# Patient Record
Sex: FEMALE | Race: WHITE | URBAN - METROPOLITAN AREA
[De-identification: names, ages, dates, MRNs, and addresses within clinical notes are randomized per-mention and may not be internally consistent; named-entity substitution may affect disease eponyms.]

---

## 2017-12-17 PROBLEM — J44.1 COPD EXACERBATION (HCC): Status: ACTIVE | Noted: 2017-12-17

## 2017-12-18 PROBLEM — J20.9 ACUTE BRONCHITIS: Status: ACTIVE | Noted: 2017-12-18

## 2017-12-18 PROBLEM — J96.01 ACUTE RESPIRATORY FAILURE WITH HYPOXIA (HCC): Status: ACTIVE | Noted: 2017-12-18

## 2017-12-20 PROBLEM — J96.01 ACUTE RESPIRATORY FAILURE WITH HYPOXIA (HCC): Status: RESOLVED | Noted: 2017-12-18 | Resolved: 2017-12-20

## 2018-01-04 ENCOUNTER — OFFICE VISIT (OUTPATIENT)
Dept: PULMONOLOGY | Age: 48
End: 2018-01-04

## 2018-01-04 VITALS
RESPIRATION RATE: 16 BRPM | WEIGHT: 237 LBS | HEIGHT: 66 IN | HEART RATE: 81 BPM | SYSTOLIC BLOOD PRESSURE: 126 MMHG | TEMPERATURE: 95 F | BODY MASS INDEX: 38.09 KG/M2 | DIASTOLIC BLOOD PRESSURE: 85 MMHG | OXYGEN SATURATION: 95 %

## 2018-01-04 DIAGNOSIS — J44.1 COPD EXACERBATION (HCC): ICD-10-CM

## 2018-01-04 DIAGNOSIS — Z23 NEED FOR STREPTOCOCCUS PNEUMONIAE VACCINATION: ICD-10-CM

## 2018-01-04 DIAGNOSIS — Z72.0 TOBACCO ABUSE: ICD-10-CM

## 2018-01-04 DIAGNOSIS — R05.9 COUGH: Primary | ICD-10-CM

## 2018-01-04 PROCEDURE — 3023F SPIROM DOC REV: CPT | Performed by: NURSE PRACTITIONER

## 2018-01-04 PROCEDURE — 4004F PT TOBACCO SCREEN RCVD TLK: CPT | Performed by: NURSE PRACTITIONER

## 2018-01-04 PROCEDURE — G8427 DOCREV CUR MEDS BY ELIG CLIN: HCPCS | Performed by: NURSE PRACTITIONER

## 2018-01-04 PROCEDURE — 90471 IMMUNIZATION ADMIN: CPT | Performed by: NURSE PRACTITIONER

## 2018-01-04 PROCEDURE — G8484 FLU IMMUNIZE NO ADMIN: HCPCS | Performed by: NURSE PRACTITIONER

## 2018-01-04 PROCEDURE — G8417 CALC BMI ABV UP PARAM F/U: HCPCS | Performed by: NURSE PRACTITIONER

## 2018-01-04 PROCEDURE — 90732 PPSV23 VACC 2 YRS+ SUBQ/IM: CPT | Performed by: NURSE PRACTITIONER

## 2018-01-04 PROCEDURE — 99214 OFFICE O/P EST MOD 30 MIN: CPT | Performed by: NURSE PRACTITIONER

## 2018-01-04 PROCEDURE — G8926 SPIRO NO PERF OR DOC: HCPCS | Performed by: NURSE PRACTITIONER

## 2018-01-04 PROCEDURE — 1111F DSCHRG MED/CURRENT MED MERGE: CPT | Performed by: NURSE PRACTITIONER

## 2018-01-04 RX ORDER — BENZONATATE 100 MG/1
100 CAPSULE ORAL 3 TIMES DAILY PRN
Qty: 42 CAPSULE | Refills: 1 | Status: SHIPPED | OUTPATIENT
Start: 2018-01-04 | End: 2018-01-18

## 2018-01-04 RX ORDER — ALBUTEROL SULFATE 90 UG/1
2 AEROSOL, METERED RESPIRATORY (INHALATION) EVERY 6 HOURS PRN
Qty: 1 INHALER | Refills: 3 | Status: SHIPPED | OUTPATIENT
Start: 2018-01-04 | End: 2018-02-26 | Stop reason: SDUPTHER

## 2018-01-04 RX ORDER — PREDNISONE 20 MG/1
40 TABLET ORAL DAILY
Qty: 10 TABLET | Refills: 0 | Status: SHIPPED | OUTPATIENT
Start: 2018-01-04 | End: 2018-01-09

## 2018-02-20 ENCOUNTER — HOSPITAL ENCOUNTER (OUTPATIENT)
Dept: CARDIAC REHAB | Age: 48
Discharge: HOME | End: 2018-02-20
Attending: NURSE PRACTITIONER | Admitting: NURSE PRACTITIONER

## 2018-02-20 DIAGNOSIS — R05.9 COUGH: ICD-10-CM

## 2018-02-20 PROCEDURE — 94060 EVALUATION OF WHEEZING: CPT | Performed by: INTERNAL MEDICINE

## 2018-02-20 PROCEDURE — 94727 GAS DIL/WSHOT DETER LNG VOL: CPT | Performed by: INTERNAL MEDICINE

## 2018-02-20 PROCEDURE — 94729 DIFFUSING CAPACITY: CPT | Performed by: INTERNAL MEDICINE

## 2018-02-20 PROCEDURE — 94618 PULMONARY STRESS TESTING: CPT | Performed by: INTERNAL MEDICINE

## 2018-02-20 RX ORDER — ALBUTEROL SULFATE 90 UG/1
4 AEROSOL, METERED RESPIRATORY (INHALATION) ONCE
Status: COMPLETED | OUTPATIENT
Start: 2018-02-20 | End: 2018-02-20

## 2018-02-20 RX ADMIN — ALBUTEROL SULFATE 4 PUFF: 90 AEROSOL, METERED RESPIRATORY (INHALATION) at 14:39

## 2018-02-20 NOTE — PROGRESS NOTES
[facility] Cardiopulmonary Rehabilitation    Qualifying Data for Home Oxygen        Resting Oxygen Saturation on Room Air:   95%    Exercise Oxygen Saturation on Room Air:  87%      Resting Oxygen Saturation on Oxygen:  99% 2L      Exercise Oxygen Saturation on Oxygen: 94% 2L

## 2018-02-22 ENCOUNTER — OFFICE VISIT (OUTPATIENT)
Dept: FAMILY MEDICINE CLINIC | Age: 48
End: 2018-02-22

## 2018-02-22 ENCOUNTER — HOSPITAL ENCOUNTER (OUTPATIENT)
Dept: OTHER | Age: 48
Discharge: HOME | End: 2018-02-22
Attending: FAMILY MEDICINE | Admitting: FAMILY MEDICINE

## 2018-02-22 VITALS
HEIGHT: 66 IN | SYSTOLIC BLOOD PRESSURE: 128 MMHG | WEIGHT: 239 LBS | BODY MASS INDEX: 38.41 KG/M2 | DIASTOLIC BLOOD PRESSURE: 84 MMHG | TEMPERATURE: 98.4 F | OXYGEN SATURATION: 96 % | HEART RATE: 76 BPM | RESPIRATION RATE: 22 BRPM

## 2018-02-22 DIAGNOSIS — G89.29 CHRONIC PAIN OF BOTH KNEES: ICD-10-CM

## 2018-02-22 DIAGNOSIS — F41.9 ANXIETY AND DEPRESSION: Primary | ICD-10-CM

## 2018-02-22 DIAGNOSIS — M25.561 CHRONIC PAIN OF BOTH KNEES: ICD-10-CM

## 2018-02-22 DIAGNOSIS — F41.9 ANXIETY AND DEPRESSION: ICD-10-CM

## 2018-02-22 DIAGNOSIS — J44.9 CHRONIC OBSTRUCTIVE PULMONARY DISEASE, UNSPECIFIED COPD TYPE (HCC): ICD-10-CM

## 2018-02-22 DIAGNOSIS — Z72.0 TOBACCO USE: ICD-10-CM

## 2018-02-22 DIAGNOSIS — M25.561 PAIN IN BOTH KNEES, UNSPECIFIED CHRONICITY: ICD-10-CM

## 2018-02-22 DIAGNOSIS — F32.A ANXIETY AND DEPRESSION: Primary | ICD-10-CM

## 2018-02-22 DIAGNOSIS — F32.A ANXIETY AND DEPRESSION: ICD-10-CM

## 2018-02-22 DIAGNOSIS — M25.562 CHRONIC PAIN OF BOTH KNEES: ICD-10-CM

## 2018-02-22 DIAGNOSIS — M25.562 PAIN IN BOTH KNEES, UNSPECIFIED CHRONICITY: ICD-10-CM

## 2018-02-22 DIAGNOSIS — F51.9 PSYCHOPHYSIOLOGIC SLEEP DISORDER: ICD-10-CM

## 2018-02-22 PROBLEM — J44.1 COPD EXACERBATION (HCC): Status: RESOLVED | Noted: 2017-12-17 | Resolved: 2018-02-22

## 2018-02-22 PROBLEM — J20.9 ACUTE BRONCHITIS: Status: RESOLVED | Noted: 2017-12-18 | Resolved: 2018-02-22

## 2018-02-22 LAB
A/G RATIO: 1.6 (ref 1.1–2.2)
ALBUMIN SERPL-MCNC: 4.7 G/DL (ref 3.4–5)
ALP BLD-CCNC: 71 U/L (ref 40–129)
ALT SERPL-CCNC: 16 U/L (ref 10–40)
ANION GAP SERPL CALCULATED.3IONS-SCNC: 14 MMOL/L (ref 3–16)
AST SERPL-CCNC: 19 U/L (ref 15–37)
BASOPHILS ABSOLUTE: 0.1 K/UL (ref 0–0.2)
BASOPHILS RELATIVE PERCENT: 1.3 %
BILIRUB SERPL-MCNC: 0.3 MG/DL (ref 0–1)
BUN BLDV-MCNC: 11 MG/DL (ref 7–20)
CALCIUM SERPL-MCNC: 9.4 MG/DL (ref 8.3–10.6)
CHLORIDE BLD-SCNC: 102 MMOL/L (ref 99–110)
CHOLESTEROL, TOTAL: 208 MG/DL (ref 0–199)
CO2: 25 MMOL/L (ref 21–32)
CREAT SERPL-MCNC: <0.5 MG/DL (ref 0.6–1.1)
EOSINOPHILS ABSOLUTE: 0.3 K/UL (ref 0–0.6)
EOSINOPHILS RELATIVE PERCENT: 3 %
ESTIMATED AVERAGE GLUCOSE: 119.8 MG/DL
GFR AFRICAN AMERICAN: >60
GFR NON-AFRICAN AMERICAN: >60
GLOBULIN: 2.9 G/DL
GLUCOSE BLD-MCNC: 91 MG/DL (ref 70–99)
HBA1C MFR BLD: 5.8 %
HCT VFR BLD CALC: 40.1 % (ref 36–48)
HDLC SERPL-MCNC: 29 MG/DL (ref 40–60)
HEMOGLOBIN: 13.8 G/DL (ref 12–16)
LDL CHOLESTEROL CALCULATED: 126 MG/DL
LYMPHOCYTES ABSOLUTE: 3.5 K/UL (ref 1–5.1)
LYMPHOCYTES RELATIVE PERCENT: 36 %
MCH RBC QN AUTO: 29.7 PG (ref 26–34)
MCHC RBC AUTO-ENTMCNC: 34.4 G/DL (ref 31–36)
MCV RBC AUTO: 86.3 FL (ref 80–100)
MONOCYTES ABSOLUTE: 0.7 K/UL (ref 0–1.3)
MONOCYTES RELATIVE PERCENT: 7 %
NEUTROPHILS ABSOLUTE: 5.1 K/UL (ref 1.7–7.7)
NEUTROPHILS RELATIVE PERCENT: 52.7 %
PDW BLD-RTO: 14.3 % (ref 12.4–15.4)
PLATELET # BLD: 384 K/UL (ref 135–450)
PMV BLD AUTO: 8.8 FL (ref 5–10.5)
POTASSIUM SERPL-SCNC: 4.5 MMOL/L (ref 3.5–5.1)
RBC # BLD: 4.64 M/UL (ref 4–5.2)
SODIUM BLD-SCNC: 141 MMOL/L (ref 136–145)
TOTAL PROTEIN: 7.6 G/DL (ref 6.4–8.2)
TRIGL SERPL-MCNC: 264 MG/DL (ref 0–150)
TSH SERPL DL<=0.05 MIU/L-ACNC: 6.58 UIU/ML (ref 0.27–4.2)
VLDLC SERPL CALC-MCNC: 53 MG/DL
WBC # BLD: 9.7 K/UL (ref 4–11)

## 2018-02-22 PROCEDURE — 99203 OFFICE O/P NEW LOW 30 MIN: CPT | Performed by: FAMILY MEDICINE

## 2018-02-22 PROCEDURE — 4004F PT TOBACCO SCREEN RCVD TLK: CPT | Performed by: FAMILY MEDICINE

## 2018-02-22 PROCEDURE — G8484 FLU IMMUNIZE NO ADMIN: HCPCS | Performed by: FAMILY MEDICINE

## 2018-02-22 PROCEDURE — G8926 SPIRO NO PERF OR DOC: HCPCS | Performed by: FAMILY MEDICINE

## 2018-02-22 PROCEDURE — G8417 CALC BMI ABV UP PARAM F/U: HCPCS | Performed by: FAMILY MEDICINE

## 2018-02-22 PROCEDURE — 3023F SPIROM DOC REV: CPT | Performed by: FAMILY MEDICINE

## 2018-02-22 PROCEDURE — G8427 DOCREV CUR MEDS BY ELIG CLIN: HCPCS | Performed by: FAMILY MEDICINE

## 2018-02-22 RX ORDER — FLUTICASONE FUROATE AND VILANTEROL 200; 25 UG/1; UG/1
1 POWDER RESPIRATORY (INHALATION) DAILY
Qty: 1 EACH | Refills: 3 | Status: SHIPPED | OUTPATIENT
Start: 2018-02-22 | End: 2018-05-24

## 2018-02-22 RX ORDER — TRAZODONE HYDROCHLORIDE 50 MG/1
TABLET ORAL
Qty: 30 TABLET | Refills: 2 | Status: SHIPPED | OUTPATIENT
Start: 2018-02-22 | End: 2018-05-24 | Stop reason: ALTCHOICE

## 2018-02-22 ASSESSMENT — PATIENT HEALTH QUESTIONNAIRE - PHQ9
6. FEELING BAD ABOUT YOURSELF - OR THAT YOU ARE A FAILURE OR HAVE LET YOURSELF OR YOUR FAMILY DOWN: 3
10. IF YOU CHECKED OFF ANY PROBLEMS, HOW DIFFICULT HAVE THESE PROBLEMS MADE IT FOR YOU TO DO YOUR WORK, TAKE CARE OF THINGS AT HOME, OR GET ALONG WITH OTHER PEOPLE: 3
9. THOUGHTS THAT YOU WOULD BE BETTER OFF DEAD, OR OF HURTING YOURSELF: 0
3. TROUBLE FALLING OR STAYING ASLEEP: 3
2. FEELING DOWN, DEPRESSED OR HOPELESS: 3
5. POOR APPETITE OR OVEREATING: 3
4. FEELING TIRED OR HAVING LITTLE ENERGY: 3
8. MOVING OR SPEAKING SO SLOWLY THAT OTHER PEOPLE COULD HAVE NOTICED. OR THE OPPOSITE, BEING SO FIGETY OR RESTLESS THAT YOU HAVE BEEN MOVING AROUND A LOT MORE THAN USUAL: 3
SUM OF ALL RESPONSES TO PHQ QUESTIONS 1-9: 24
7. TROUBLE CONCENTRATING ON THINGS, SUCH AS READING THE NEWSPAPER OR WATCHING TELEVISION: 3
SUM OF ALL RESPONSES TO PHQ9 QUESTIONS 1 & 2: 6
1. LITTLE INTEREST OR PLEASURE IN DOING THINGS: 3

## 2018-02-22 ASSESSMENT — ENCOUNTER SYMPTOMS
NAUSEA: 0
SINUS PRESSURE: 0
SORE THROAT: 0
VOMITING: 0
EYE REDNESS: 0
SHORTNESS OF BREATH: 0
DIARRHEA: 0
COLOR CHANGE: 0
COUGH: 0

## 2018-02-22 NOTE — PATIENT INSTRUCTIONS
[address] [facility] [address] Psychological  [email or web address] - [phone]     [address] [place] [address] 1915 [facility], [address] [address] [facility] 429  [phone]    Dr Jacqui Claire, Dr Jasmyne Medina, Dr Richa Zamorano and Dr Moris Talbot, psychologists.   [address]

## 2018-02-22 NOTE — PROCEDURES
Spirometry was acceptable and reproducible by ATS standards      1. Flows: Flow measurements demonstrate the presence of an obstructive pulmonary defect. The obstruction is moderately severe as defined by an FEV1 between 50-59% predicted. Following the administration of bronchodilator a significant improvement in flow measurements was seen. 2. Lung volumes: Total lung capacity is normal. Vital capacity is reduced. ERV is reduced. RV/TLC is at the upper limit of normal.    3. Diffusing capacity:  Diffusion is mildly reduced. When averaged over lung volumes it normalizes. Summary: Moderately severe lower airflow obstruction with significant reversal. There is no definite air trapping, but RV/TLC is at the upper part of normal. Diffusing capacity is mildly reduced. Findings may be seen in COPD, asthma or bronchiectasis. Clinical correlation is needed. OBSTRUCTION % Predicted FEV1   MILD >70%   MODERATE 60-69%   MODERATELY-SEVERE 50-59%   SEVERE 35-49%   VERY SEVERE <35%         RESTRICTION % Predicted TLC   MILD 66-80%   MODERATE 54-65%   MODERATELY-SEVERE <54%                 DIFFUSION CAPACITY DLCO % Pred   MILD >60% AND < LLN   MODERATE 40-60%   SEVERE <40%       PFT data will be scanned into the media tab under this encounter. Please see the scanned data for numerical values.      Eduard Odell MD  [facility] Pulmonology, Critical Care and Sleep

## 2018-02-26 ENCOUNTER — TELEPHONE (OUTPATIENT)
Dept: FAMILY MEDICINE CLINIC | Age: 48
End: 2018-02-26

## 2018-02-26 ENCOUNTER — OFFICE VISIT (OUTPATIENT)
Dept: PULMONOLOGY | Age: 48
End: 2018-02-26

## 2018-02-26 VITALS
BODY MASS INDEX: 38.15 KG/M2 | RESPIRATION RATE: 16 BRPM | HEIGHT: 66 IN | SYSTOLIC BLOOD PRESSURE: 135 MMHG | OXYGEN SATURATION: 96 % | DIASTOLIC BLOOD PRESSURE: 86 MMHG | TEMPERATURE: 96.7 F | WEIGHT: 237.4 LBS | HEART RATE: 74 BPM

## 2018-02-26 DIAGNOSIS — R09.02 HYPOXIA: ICD-10-CM

## 2018-02-26 DIAGNOSIS — Z72.0 TOBACCO USE: ICD-10-CM

## 2018-02-26 DIAGNOSIS — R79.89 ABNORMAL TSH: Primary | ICD-10-CM

## 2018-02-26 DIAGNOSIS — J44.9 ASTHMA-COPD OVERLAP SYNDROME (HCC): Primary | ICD-10-CM

## 2018-02-26 DIAGNOSIS — J44.9 ASTHMA-COPD OVERLAP SYNDROME (HCC): ICD-10-CM

## 2018-02-26 DIAGNOSIS — R06.02 SHORTNESS OF BREATH: ICD-10-CM

## 2018-02-26 PROCEDURE — G8417 CALC BMI ABV UP PARAM F/U: HCPCS | Performed by: INTERNAL MEDICINE

## 2018-02-26 PROCEDURE — G8484 FLU IMMUNIZE NO ADMIN: HCPCS | Performed by: INTERNAL MEDICINE

## 2018-02-26 PROCEDURE — 3023F SPIROM DOC REV: CPT | Performed by: INTERNAL MEDICINE

## 2018-02-26 PROCEDURE — G8926 SPIRO NO PERF OR DOC: HCPCS | Performed by: INTERNAL MEDICINE

## 2018-02-26 PROCEDURE — 4004F PT TOBACCO SCREEN RCVD TLK: CPT | Performed by: INTERNAL MEDICINE

## 2018-02-26 PROCEDURE — 99214 OFFICE O/P EST MOD 30 MIN: CPT | Performed by: INTERNAL MEDICINE

## 2018-02-26 PROCEDURE — G8427 DOCREV CUR MEDS BY ELIG CLIN: HCPCS | Performed by: INTERNAL MEDICINE

## 2018-02-26 RX ORDER — ALBUTEROL SULFATE 2.5 MG/3ML
2.5 SOLUTION RESPIRATORY (INHALATION) EVERY 4 HOURS PRN
Qty: 180 ML | Refills: 11 | Status: SHIPPED | OUTPATIENT
Start: 2018-02-26

## 2018-02-26 RX ORDER — ALBUTEROL SULFATE 90 UG/1
2 AEROSOL, METERED RESPIRATORY (INHALATION) EVERY 6 HOURS PRN
Qty: 1 INHALER | Refills: 5 | Status: SHIPPED | OUTPATIENT
Start: 2018-02-26 | End: 2018-05-24 | Stop reason: SDUPTHER

## 2018-02-26 NOTE — PROGRESS NOTES
Chief complaint  This is a 41y.o. year old female  who presents with a chief complaint of   Chief Complaint   Patient presents with    Follow-up     COPD       HPI  29-year-old with asthma/COPD overlap presents for follow-up. She reports that she is short of breath with many activities. She has significant shortness of breath climbing her stairs at home. She is able to do all the household chores, but has to do them slowly. She has a non-productive cough. She says she ran out of Viraloid and it is being approved through her primary care physician's office. She has Pro-Air which she uses about 4-5 times a day, but does not notice significant improvement. She is smoking 10-12 cigarettes a day. Of note, she was admitted to [facility] in 2017 for asthma/COPD exacerbation and acute bronchitis.     Past Medical History:   Diagnosis Date    Anxiety     Asthma     COPD (chronic obstructive pulmonary disease) ([facility] [address]     Depression     Endometriosis        Past Surgical History:   Procedure Laterality Date     SECTION      ENDOMETRIAL ABLATION      HERNIA REPAIR      HYSTERECTOMY      INCONTINENCE SURGERY  2017    TONSILLECTOMY         Current Outpatient Prescriptions   Medication Sig Dispense Refill    albuterol (PROVENTIL) (2.5 MG/3ML) 0.083% nebulizer solution Take 3 mLs by nebulization every 4 hours as needed for Wheezing or Shortness of Breath 180 mL 11    albuterol sulfate HFA (PROAIR HFA) 108 (90 Base) MCG/ACT inhaler Inhale 2 puffs into the lungs every 6 hours as needed for Wheezing or Shortness of Breath 1 Inhaler 5    Fluticasone Furoate-Vilanterol (BREO ELLIPTA) 200-25 MCG/INH AEPB Inhale 1 puff into the lungs daily 1 each 3    sertraline (ZOLOFT) 50 MG tablet Take 1 tablet by mouth daily 30 tablet 3    traZODone (DESYREL) 50 MG tablet Take 1/2 or 1 tablet by mouth nightly for sleep 30 tablet 2    nicotine (NICODERM CQ) 21 MG/24HR Place 1 patch onto the skin daily 30 patch 3     No

## 2018-02-27 ENCOUNTER — HOSPITAL ENCOUNTER (OUTPATIENT)
Dept: OTHER | Age: 48
Discharge: HOME | End: 2018-02-28
Attending: NURSE PRACTITIONER | Admitting: NURSE PRACTITIONER

## 2018-02-27 NOTE — PROGRESS NOTES
Activities:    Employment Status:  []  FT  []  PT  [] Disabled [] Retired    Comments:       Occupation:***          Hobbies/Leisure activities    Social/Spiritual:        Social History     Social History    Marital status: Single     Spouse name: N/A    Number of children: 6    Years of education: N/A     Occupational History    on disability      Social History Main Topics    Smoking status: Current Every Day Smoker     Packs/day: 0.50     Years: 28.00     Types: Cigarettes     Start date: 1/1/1984    Smokeless tobacco: Never Used      Comment: states she is trying to work with patches but not helping    Alcohol use No    Drug use: No    Sexual activity: Yes     Partners: Male     Other Topics Concern    Not on file     Social History Narrative    One child at home    No grandchildren          Do you live alone: []  Alone []  with spouse/family       Do you have stairs in your home  []  no []  yes        Comment:       Transportation  []  drive self []  family/friend     Comment:       Cultural/Spiritual Influences: (Anabaptist groups, etc)       Any Cultural or Restorationist practices we should be aware of?                      Fall Risk Assessment:     []  Cognitive Impairment []  Balance/Gait Disturbance   []  Fall History   []  Visual Difficulty   []  Dizziness   []  Other Comments:***    Physical Assessment:    Color: []  natural []  pale [] cyanotic []  flushed []  jaundice    Skin Integrity []  intact [] other:    Heart Rate ***  Resp Rate ***      Breath Sounds: ***    Blood Pressures Sitting: Rt arm ***  Left arm: ***       Standing Rt arm ***  Left arm: ***    Resting SpO2: ***  Resp effort/pattern: ***      Peripheral pulses: {YES / NO:19727}    Edema:  {YES / NO:19727}    Numbness/tingling:  {YES / NK:85424}    Orthopedic/exercise limitations:  {YES / ZI:79714}      Psychosocial assessment:    Support System:***    Physical/Behavioral signs of abuse/neglect:***    Advance Directives:  {YES /

## 2018-02-28 LAB
ALLERGEN ASPERGILLUS ALTERNATA IGE: <0.1 KU/L
ALLERGEN ASPERGILLUS FUMIGATUS IGE: <0.1 KU/L
ALLERGEN BERMUDA GRASS IGE: <0.1 KU/L
ALLERGEN BIRCH IGE: <0.1 KU/L
ALLERGEN CAT DANDER IGE: <0.1 KU/L
ALLERGEN COMMON SHORT RAGWEED IGE: <0.1 KU/L
ALLERGEN COTTONWOOD: <0.1 KU/L
ALLERGEN COW MILK IGE: <0.1 KU/L
ALLERGEN DOG DANDER IGE: <0.1 KU/L
ALLERGEN ELM IGE: <0.1 KU/L
ALLERGEN FUNGI/MOLD M.RACEMOSUS IGE: <0.1 KU/L
ALLERGEN GERMAN COCKROACH IGE: 0.15 KU/L
ALLERGEN HORMODENDRUM HORDEI IGE: <0.1 KU/L
ALLERGEN MAPLE/BOX ELDER IGE: <0.1 KU/L
ALLERGEN MITE DUST FARINAE IGE: <0.1 KU/L
ALLERGEN MITE DUST PTERONYSSINUS IGE: <0.1 KU/L
ALLERGEN MOUNTAIN CEDAR: <0.1 KU/L
ALLERGEN MOUSE EPITHELIA IGE: <0.1 KU/L
ALLERGEN OAK TREE IGE: <0.1 KU/L
ALLERGEN PEANUT (F13) IGE: <0.1 KU/L
ALLERGEN PECAN TREE IGE: <0.1 KU/L
ALLERGEN PENICILLIUM NOTATUM: <0.1 KU/L
ALLERGEN ROUGH PIGWEED (W14) IGE: <0.1 KU/L
ALLERGEN RUSSIAN THISTLE IGE: <0.1 KU/L
ALLERGEN SEE NOTE: NORMAL
ALLERGEN SHEEP SORREL (W18) IGE: <0.1 KU/L
ALLERGEN TIMOTHY GRASS: <0.1 KU/L
ALLERGEN TREE SYCAMORE: <0.1 KU/L
ALLERGEN WALNUT TREE IGE: <0.1 KU/L
ALLERGEN WHITE MULBERRY TREE, IGE: <0.1 KU/L
ALLERGEN, TREE, WHITE ASH IGE: <0.1 KU/L
IGE: 135 KU/L

## 2018-03-01 ENCOUNTER — HOSPITAL ENCOUNTER (OUTPATIENT)
Dept: CARDIAC REHAB | Age: 48
Discharge: HOME | End: 2018-03-31

## 2018-03-01 ENCOUNTER — HOSPITAL ENCOUNTER (OUTPATIENT)
Dept: OTHER | Age: 48
Discharge: HOME | End: 2018-03-01
Attending: NURSE PRACTITIONER | Admitting: NURSE PRACTITIONER

## 2018-03-01 ENCOUNTER — HOSPITAL ENCOUNTER (OUTPATIENT)
Dept: CARDIAC REHAB | Age: 48
Discharge: HOME | End: 2018-03-02

## 2018-03-01 NOTE — PROGRESS NOTES
[address] [facility] PULMONARY REHABILITATION ORDER  Medical Director:  Dr. Waddell Hammans  (X)Phase II Pulmonary Rehabilitation [facility] Facility-based, supervised exercise with SpO2 / HR monitoring and Oxygen Titration (if necessary) each session, 2-3 times weekly, with individualized education sessions. Patient Name: Quinn Huitron  : 1970  Referring Physician: Yayo Galarza NP  Date: 3/1/2018    Medically Necessary Pulmonary Rehab for: Moderately Severe COPD (from my dictation or the PFT report), which is GOLD Stage 2. Physician Prescribed Exercise:  Length of program:  Up to 36 sessions, subject to insurance limitations. Program to include aerobic endurance conditioning, resistance training (RT), step training (ST), flexibility training, and education (all relevant topics including psychosocial assessment). FITT Principles + Progression for Exercise Prescription (also found on the ITP):     Frequency: 2-3x / wk for up to 36 sessions    Intensity:   Set from Initial 6MWT (Feet achieved converted to METs)   Type:          Aerobic and Strength (Treadmill, AD, NuStep, SciFit, UBE, RT, ST)   Time:        15-60 min. of Treatment; Aerobic, RT, ST, Rests and Education  Progression:  1-2 minute increase in Time, per Type, per session, 5-20% increase in Intensity per week if SpO2 >88 AND Antonino RPE/RPD is <14      Note:  These are guidelines. The Pulmonary Rehab staff may adjust the treatment to suit the patient's individual needs, goals, oxygen saturation and functional level. Plan of Care:  Pulmonary Rehab aerobic endurance and strength training sessions for a total of 30-91 min/day, including Education time, 2-3 days/week with suggested supplemented matching minutes of walking at home on most days not participating in Pulmonary Rehab. Patient is willing to cooperate and participate in the plan of care.  Patient is physically able, motivated, and willing to participate in

## 2018-03-01 NOTE — PROGRESS NOTES
Orthopnea                   Yes- sleeps with 3-4 pillows                 Sleep Disturbances     No                 Edema                         No                 Dyspnea                      Yes     Oxygen Therapy:                 Home O2          2 lpm              []  Prn                 [x] continuous                  []  HS                 [] CPAP []  BiPAP                 Company:                     Comments:     Occupational/ Recreational Activities:      Employment Status:    []  FT                  []  PT                  [x] Disabled         [] Retired                       Comments:                                         Occupation:                                                                            Hobbies/Leisure activities:Taking walks, spending time with daughters      Social/Spiritual:                   Social History   Social History            Social History    Marital status: Single       Spouse name: N/A    Number of children: 6    Years of education: N/A           Occupational History    on disability               Social History Main Topics    Smoking status: Current Every Day Smoker       Packs/day: 0.50       Years: 28.00       Types: Cigarettes       Start date: 1/1/1984    Smokeless tobacco: Never Used         Comment: states she is trying to work with patches but not helping    Alcohol use No    Drug use: No    Sexual activity: Yes       Partners: Male           Other Topics Concern    Not on file          Social History Narrative     One child at home     No grandchildren                                                Do you live alone:       []  Alone [x]  with spouse/family                                         Do you have stairs in your home        []  no      [x]  yes                                              Comment:12 steps to go up to bedroom                                         Transportation             []  drive self        [x]  Family/friend Comment: \"too panicky to drive\"                                         Cultural/Spiritual Influences: (Christianity groups, etc)                                         Any Cultural or Gnosticist practices we should be aware of? No                    Fall Risk Assessment:                 []  Cognitive Impairment            []  Balance/Gait Disturbance              []  Fall History                            []  Visual Difficulty              []  Dizziness                               []  Other Comments:     Physical Assessment:     Color:   [x]  natural           []  pale   [] cyanotic          []  flushed          []  jaundice     Skin Integrity   [x]  intact  [] other:     Heart Rate 74              Resp Rate 18                   Breath Sounds: Diminished     Blood Pressures         Sitting: Rt arm 108/70             Left arm: 104/76        Resting SpO2: 97% 2 lpm                  Resp effort/pattern: Easy/Regular        Peripheral pulses: Yes     Edema:  No     Numbness/tingling: No     Orthopedic/exercise limitations:  No        Psychosocial assessment:     Support System: [facility] and  children     Physical/Behavioral signs of abuse/neglect: No     Advance Directives:  No                     [] info given     Learning Preferences:            Primary Language:English                                                     Preferred method of learning  []  video  []  handouts                                                                                           [] listening/lecture   [x]  all     Memory impairment?   No            EXERCISE  Initial Assessment  Day 1 EXERCISE  Intervention  Day 2-30 EXERCISE  Re-Assessment  Day 31-60 EXERCISE  Re-Assessment  Day 61-90+ EXERCISE  Last Day   Date:   03/01/2018 Date:  Date:  Date:  Date:                        Pre Rehab  6 MWT  1007 ft = 57% pred (reduced)  2.5 METs  SpO2: 87% ra, 94% 2 lpm    1.9  MPH   HR: 104bpm      RPD: 5, RPE: 8 Benefits of Exercise Class  []  Attended Resistance Training Class                                                                            GOALS                                                                                                                                      Rate your physical   fitness (PF)?:   /10     -30 day PF goal:  /10     EDUCATION  [] Attended all individually relevant exercise education sessions? []  Knows current exercise goals and recmndtns? :                                                                        Goals Achieved?                                                                                                                                   Rate your physical fitness now?:     /10  Handgrip:  Right:  Left:     Discharge  EDUCATION  []  Attended all individually relevant exercise education sessions? [] Knows current exercise goals and recmndtns? :                                                                                                                                  PHYSICIAN DIRECTED   EXERCISE RX      RPE : 11 - 14  Titrate O2 to keep SpO2 >89%     Frequency (F): 2-3x/wk in Rehab,            Initial Intensity : 2.5 METs (from 6MWT)   1.5-2.0 (60-80% of walk speed for TM)     Type (T): Aerobic (TM,NS, SF, AE, AB, RB, EL, Arc), RT 1-3#, 8-16 reps     CAN USE ALL EQUIPMENT EXCEPT         Time (Ti): Aerobic:   15-40 min                     Progression: 1-2 min total time for TM, AB, NS, SF or Arm ergometer per session or when a steady state has occurred in RPE if  SpO2 and HR levels are acceptable                PHYSICIAN DIRECTED   EXERCISE RX         Titrate O2 to keep SpO2 >89%     Frequency (F): 2-3x/wk in Rehab, 1-3x/wk home walking         Intensity (I):  Initial + 5-10% increase each week or when a steady state has occurred in RPE if  SpO2 and HR levels are acceptable  Type (T)  Aerobic (TM,NS, SFR,SFS, AE, AB, RB), RT   8-16 reps     CAN USE ALL Quit Date:   (if applicable)                 Intervention  [] Pt used TCS counseling               Other  Components:     [x]  Environmental Factors     [x]  Prevention Management of Exacerbations     []  CHF Management     []  Hypertension Management       Intervention/  Education                        [facility] INDIVIDUAL TREATMENT PLAN  OXYGEN AND MEDICATIONS     OXYGEN  MEDICATIONS   Initial Assessment  Day 1 OXYGEN  MEDICATIONS  Intervention  Day 2-30 OXYGEN  MEDICATION  Re-Assessment  Day 31-60 OXYGEN  MEDICATION  ReAssessment Day 61-90+ OXYGEN  MEDICATION  Discharge  Final Day                        Medications  [x]  Uses as prescribed  []  Non- compliant with prescribed meds     Respiratory Medications     [x]  Proper use   and technique of MDI, DPI and/or nebs  []  Incorrect use and technique of MDI, DPI and /or nebs     Hypoxemia  Problem:     []  No problem  [] Noncompliant with O2 use  []  Oxygen in MS only  []  No home O2  []  No portable O2  []  Needs O2 prescription and O2 qualifying data sent for setup- Done  (x)Poor knowledge of O2 use/safety     Current Oxygen Prescription:   2 l/min rest  2 l/min exercise   titration   plan/weaning plan:  CPAP/BIPAP:     Goals:      [x]  Manage Hypoxemia  [x]  receive adequate portable system  [x]  Compliant with use as prescribed  [x]  Learn O2 safety guidelines    Medications  []  Uses as prescribed  []  Non- compliant with prescribed meds     Respiratory Medications     [] Proper use and technique of MDI, DPI and/or nebs  []  Incorrect use and technique of MDI, DPI and /or nebs     Hypoxemia  Problem:        Current Oxygen Prescription:    l/min rest   l/min exercise   titration plan/weaning plan:     Goals:   []  Manage Hypoxemia  []  receive adequate portable system  []  Compliant with use as prescribed  []  Learn O2 safety guidelines               Medications  []  Uses as prescribed  [] Non- compliant with prescribed meds     Respiratory Medications     [] Intervention     [] Pt has had Nutrition class? [] Informal questions asked regarding nutrition/weight control Intervention     []  Pt has had Nutrition class? [] Questions addressed Intervention     []  Pt has had Nutrition class?    Intervention     Pt aware of:     [] Pulmonary eating techniques   [] Smart choices, Calories   []Gas-producing foods   [] Wt gain / loss strategies      GOALS     Weight Loss          30 DAY TARGET GOAL:     [x] Decrease portion size by 250-500 calories/day  [x] Increase fluid intake to 6-8 8oz. [x] Eat less sweets        Reasonable, achievable 30 day weight goal: 235 lbs   (1-2 lb per week is recommended)                 Weight Gain         30 day   Target Goal:     [] increase proteins  [] add nutrition  Supplement  [] 6 small meals        Did pt meet Initial 30 day Target Goal(s)?:      New 30 DAY TARGET GOAL:     [] Decrease saturated fats  [] Decrease gas producing  cruciferous veggies   -  Reasonable, achievable 30 day weight goal:      Did pt meet previous 30 day Target   Goal(s)?:      New 30 DAY TARGET GOAL:     [] Switch to whole grains whenever possible  [] Decrease/ Eliminate carbonated beverages     Reasonable, achievable 30 day weight goal:     Did pt meet previous 30 day Target   Goal(s)?:       New 30 DAY TARGET GOAL:     [] Smaller meals more frequently  [] Decrease portion sizes by 25%        Reasonable, achievable 30 day weight goal:                          Did pt meet previous 30 day Target   Goal(s)? :                                   [facility] INDIVIDUAL TREATMENT PLAN  PSYCHOSOCIAL DOMAIN:     Psychosocial   Initial Assessment  Day 1 Psychosocial   Intervention  Day 2-30 Psychosocial  Re-Assessment  Day 31-60 Psychosocial   Re-Assessment  Day 61-90+ Psychosocial Discharge  Final Day   Psychosocial Screening Tools     (X) PHQ-9 score: 12        (X) SF-36: 39        (X) UCSD SOB score: 97            PHQ-9 Score:   If score >or =15, Action:      SF-36 Mental Classes:  [] Nutrition  [] Panic control, relaxation, managing depression  [] A&P of the Respiratory System   [] Environ. Issues+ Travel   [] Bronchial Hygiene / Preventing Infection  [] Resistance Training  []  Benefits of Exercise  [] Energy Cons          Education  Individual instruction  [] PLB  [] RPD/RPE   [] O2 use  []  review PFT  [] Inhalers   [] Home Activity/Warm up/ stretches  Attend Classes:  [] Nutrition  []  Panic conntrol, relaxation, managing depression  []  A&P of the Respiratory System   [] Environ. Issues+ Travel   [] Bronchial Hygiene / Preventing Infection  []  Resistance Training  [] Benefits of Exercise  [] Energy Cons     Education  Individual instruction  [] PLB  [] RPD/RPE   []  O2 use  []  review PFT  [] Inhalers   [] Home Activity/Warm up/ stretches  Attend Classes:  [] Nutrition  []  Panic conntrol, relaxation, managing depression  [] A&P of the Respiratory System   [] Environ. Issues+ Travel   [] Bronchial Hygiene / Preventing Infection  []  Resistance Training  [] Benefits of Exercise  [] Energy Cons    Education  []  Addressed pt questions on Education Topics                                                                                   Physician Interaction / Response:  (If none, continue on present care plan)       Physician Interaction / Response:   (If none, continue on present care plan)    Physician Interaction / Response:   (If none, continue on present care plan)    Physician Interaction / Response:   (If none, continue on present care plan)    Physician Interaction / Response:        Discharge the patient.            Rehab Potential:  []  Good [] Fair [] Poor     Summary  Bianka Walker is a 41 yr old female with a hx of COPD, asthma and anxiety who was referred to Pulmonary Rehab for increased shortness of breath and deconditioning.  She is currently smoking 10-12 cigarettes a day and based on recent 6 min walk results, requires oxygen continuously at 2 lpm. She states she

## 2018-03-01 NOTE — PROGRESS NOTES
[facility] Facility-Based Therapy for COPD  INDIVIDUAL TREATMENT PLAN (ITP)     NAME: Mackenzie Mojica. O.B: 1970  Account Number: [de-identified]  AGE: 41 y.o. Diagnosis: Moderately Severe COPD which is GOLD Stage 2. PFT:  FEV1/FVC: 50%, FEV1: 40% FVC: 63%  Notes: Medicare requirements for Pulmonary Rehabilitation include a PFT within the last 12 months revealing: FEV1/FVC <70, and FEV1 <80%, and documentation from the referring physician stating that the patient has quit smoking or will participate in smoking cessation activities prior to, or during the Pulmonary Rehab program.  A 6 Minute Walk Test (6 MWT) at the beginning and end of the program is also indicated.   GLOSSARY: PF= Physical Fitness  TM=Treadmill  AD= [employer] AirDyne Bike  UBE=Upperbody Ergometry LBE=Lowerbody Ergometer  NS=NuStep SF= SciFit  ST=Step Training  RT=Resistance Training   PLB=Pursed Lip Breathing   DY= Dynabands  HW= Handweights   Cybex RT= Cybex Mult istation weight machine   RB=Recumbent    REFERRING PHYSICIAN: Indy Thornton NP      Medical History:     Past Medical History:   Diagnosis Date    Anxiety     Asthma     COPD [facility]chronic obstructive pulmonary disease) ([facility] [address]     Depression     Endometriosis        Surgical History:     Past Surgical History:   Procedure Laterality Date     SECTION      ENDOMETRIAL ABLATION      HERNIA REPAIR      HYSTERECTOMY      INCONTINENCE SURGERY  2017    TONSILLECTOMY         Major Symptoms:     Cough/sputum             Yes-rarely productive      Wheezing  Occasional     Chest Discomfort  No     Orthopnea  Yes- sleeps with 3-4 pillows     Sleep Disturbances No     Edema   No     Dyspnea  Yes    Oxygen Therapy:     Home O2   2 lpm  []  Prn  [x] continuous  []  HS     [] CPAP []  BiPAP     Company:         Comments:    Occupational/ Recreational Activities:     Employment Status:  []  FT  []  PT  [x] Disabled [] Retired    Comments:       Occupation:          Hobbies/Leisure activities:Taking walks, spending time with daughters     Social/Spiritual:        Social History     Social History    Marital status: Single     Spouse name: N/A    Number of children: 6    Years of education: N/A     Occupational History    on disability      Social History Main Topics    Smoking status: Current Every Day Smoker     Packs/day: 0.50     Years: 28.00     Types: Cigarettes     Start date: 1/1/1984    Smokeless tobacco: Never Used      Comment: states she is trying to work with patches but not helping    Alcohol use No    Drug use: No    Sexual activity: Yes     Partners: Male     Other Topics Concern    Not on file     Social History Narrative    One child at home    No grandchildren          Do you live alone: []  Alone [x]  with spouse/family       Do you have stairs in your home  []  no [x]  yes        Comment:12 steps to go up to bedroom       Transportation  []  drive self [x]  Family/friend                                         Comment: \"too panicky to drive\"       Cultural/Spiritual Influences: (Temple groups, etc)       Any Cultural or Yazidi practices we should be aware of?  No                    Fall Risk Assessment:     []  Cognitive Impairment []  Balance/Gait Disturbance   []  Fall History   []  Visual Difficulty   []  Dizziness   []  Other Comments:    Physical Assessment:    Color: [x]  natural []  pale [] cyanotic []  flushed []  jaundice    Skin Integrity [x]  intact [] other:    Heart Rate 74  Resp Rate 18      Breath Sounds: Diminished    Blood Pressures Sitting: Rt arm 108/70  Left arm: 104/76      Resting SpO2: 97% 2 lpm  Resp effort/pattern: Easy/Regular      Peripheral pulses: Yes    Edema:  No    Numbness/tingling:  No    Orthopedic/exercise limitations:  No      Psychosocial assessment:    Support System: Fiance and 4 children    Physical/Behavioral signs of abuse/neglect: No    Advance Directives: Understands proper set/up O2 use  []  Understands SpO2 and RPD? [] Aerobic progression explained? []  Intensity progression explained? GOALS                                                                                           Rate your physical   fitness (PF)?:   /10        -30 day PF goal:  /10    EDUCATION   []  Home Activity education offered  [] Stretching/ Flexibility education offered  [] Attended Benefits of Exercise Class  []  Attended Resistance Training Class                                                    GOALS                                                                                           Rate your physical   fitness (PF)?:   /10    -30 day PF goal:  /10    EDUCATION  [] Attended all individually relevant exercise education sessions? []  Knows current exercise goals and recmndtns?:                                                  Goals Achieved? Rate your physical fitness now?:     /10  Handgrip:  Right:  Left:    Discharge  EDUCATION  []  Attended all individually relevant exercise education sessions?    [] Knows current exercise goals and recmndtns?:                                                                                        PHYSICIAN DIRECTED   EXERCISE RX     RPE : 11 - 14  Titrate O2 to keep SpO2 >89%    Frequency (F): 2-3x/wk in Rehab,         Initial Intensity : 2.5 METs (from 6MWT)   1.5-2.0 (60-80% of walk speed for TM)    Type (T): Aerobic (TM,NS, SF, AE, AB, RB, EL, Arc), RT 1-3#, 8-16 reps    CAN USE ALL EQUIPMENT EXCEPT       Time (Ti): Aerobic:   15-40 min               Progression: 1-2 min total time for TM, AB, NS, SF or Arm ergometer per session or when a steady state has occurred in RPE if  SpO2 and HR levels are acceptable           PHYSICIAN DIRECTED   EXERCISE RX       Titrate O2 to keep SpO2 >89%    Frequency (F): 2-3x/wk in Rehab, 1-3x/wk home walking Intensity (I):  Initial + 5-10% increase each week or when a steady state has occurred in RPE if  SpO2 and HR levels are acceptable  Type (T)  Aerobic (TM,NS, SFR,SFS, AE, AB, RB), RT   8-16 reps    CAN USE ALL EQUIPMENT EXCEPT        Time (Ti): Aerobic:   30-40 min        Progression:   1-2 min total time for TM, AB, NS, SF or Arm ergometer per session or when a steady state has occurred in RPE if  SpO2 and HR levels are acceptable        Is pt. progressing in rehab?:               PHYSICIAN DIRECTED   EXERCISE RX       Titrate O2 to keep SpO2 >89%    Frequency (F): 2-3x/wk in Rehab, 1-3x/wk home walking       Intensity (I):  Initial + 5-10% increase each week when a steady state has occurred in RPE if  SpO2 and HR levels are acceptable  Type (T)  Aerobic (TM,NS, SFR,SFS, AE, AB, RB), RT   8-16 reps    CAN USE ALL EQUIPMENT EXCEPT        Time (Ti): Aerobic:   30-50 min     Progression:   1-2 min total time for TM, AB, NS, SF or Arm ergometer per session or when a steady state has occurred in RPE if  SpO2 and HR levels are acceptable              Is pt. progressing in rehab?:                 PHYSICIAN DIRECTED   EXERCISE RX       Titrate O2 to keep SpO2 >89%    Frequency (F): 2-3x/wk in Rehab, 1-3x/wk home walking       Intensity (I):  Initial + 5-10% increase each week when a steady state has occurred in RPE if  SpO2 and HR levels are acceptable  Type (T):   Aerobic (TM,NS, SFR,SFS, AE, AB, RB), RT   8-16 reps    CAN USE ALL EQUIPMENT EXCEPT          Time (Ti): Aerobic:   30-58 min         Progression:   1-2 min total time for TM, AB, NS, SF or Arm ergometer per session or when a steady state has occurred in RPE if  SpO2 and HR levels are acceptable            Is pt. progressing in rehab?:               Final Intensity (I): See below and final 6MWT above            ACHIEVED TOTAL AEROBIC EXERCISE TIME:  min         FINAL LEVELS:  [] TM: min  [] Nustep: level  min  [] Arm ergometer: garrido min  [] Scifit: level min  [] HW :  # x  reps  [] Dy:    X   reps  [] Cybex RT:    # x   Reps  [] Eliptical:level  X  Min  [] Arc: level   X    mins  [] RB:  Level  X   mins  [] AB: level   X     Min              Did pt. progress in rehab?:     30 DAY TARGET GOAL  [x] Start slowly but progress each week  [x] Increase duration on the equipment  [x] Start resistance training    -30 day PF goal: /10                 Current Home Exercise :None    Frequency:      Type:                     Health Knowledge   Test Score:  20/25     Current Home Exercise:   Frequency:      Type:         Time:  min                                  Current Home Exercise:   Frequency:       Type:         Time:  min                                Current Home Exercise:   Frequency:       Type:         Time:  min                                                      Discharge exercise plan                                  Repeat Health Knowledge Test Score :    /25     [facility] INDIVIDUAL TREATMENT PLAN  SMOKING AND   OTHER COMPONENTS    Smoking/Other  Components   Initial Assessment  Day 1 Smoking/Other  Components  Intervention  Day 2-30 Smoking/Other  Components  Re-Assessment  Day 31-60 Smoking/Other  Components  Re-Assessment Day 61-90+ Smoking/Other  Components  Discharge  Final Day   Tobacco Use Hx  [x] Y  [] N?:   Quit Date: Current smoker  Cig/Day: 10-12  Years: 35  Tobacco Use  Any change in Smoking Status?:  Trying to cut down  []  not smoking  Quit Date:   (if applicable)  Intervention  [x]  Information/ed material given on cessation techniques  [x]  smoking cessation class schedule given Tobacco Use  Any change in Smoking Status?:      Quit Date:   (if applicable)          Intervention  [] Referral to Tobacco Cessation Specialist (TCS) Tobacco Use  Any change in Smoking Status?:     Quit Date:   (if applicable)        Intervention Tobacco Use  Any change in Smoking Status?:     Quit Date:   (if applicable)          Intervention Tobacco Use  Any change in MDI, DPI and/or nebs  [] Incorrect use and technique of MDI, DPI and /or nebs    Hypoxemia  Problem:      Current Oxygen Prescription:    l/min rest   l/min exercise   titration plan/weaning plan:    Goals:   []  Manage Hypoxemia  []  receive adequate portable system  []  Compliant with use as prescribed  [] Learn O2 safety guidelines           Medications  []  Uses as prescribed  []  Non- compliant with prescribed meds    Respiratory Medications    []  Proper use and technique of MDI, DPI and/or nebs  []  Incorrect use and technique of MDI, DPI and /or nebs    Hypoxemia  Problem:      Current Oxygen Prescription:    l/min rest   l/min exercise   titration plan/weaning plan:    Goals:   []  Manage Hypoxemia  []  receive adequate portable system  [] Compliant with use as prescribed  []  Learn O2 safety guidelines     Medications    [] Compliant  []  Noncompliant       Respiratory Medications    []  Compliant  [] Noncompliant              Hypoxemia  Problem:    []  Compliant  []  Noncompliant    Final Oxygen Prescription:    l/min rest   l/min exercise                                                           [facility] INDIVIDUAL TREATMENT PLAN  NUTRITION DOMAIN:        NUTRITION   Initial Assessment  Day 1 NUTRITION   Intervention  Day 2-30 NUTRITION   Re-Assessment  Day 31-60 NUTRITION   Re-Assessment Day 61-90+ NUTRITION Discharge  Final Day   Weight Management:  240.2 lbs  Current BMI 38.8 Weight Management:   lbs  Current BMI Weight Management:    lbs  Current BMI Weight Management:    lbs  Current BMI Weight Management:    lbs  Current BMI   Diabetes?: No  A1C 5.8 on 2/22/18  Fasting BS:   Insulin?:    Oral agent? Diabetes:  If y, has patient seen a positive trend in FBS?:      Intervention    [x] Basic nutrition education   [] Referral to Diabetes Education? [] Referral to weightloss/nutrition education     Intervention    [] Pt has had Nutrition class?   [] Informal questions asked regarding nutrition/weight control Intervention    []  Pt has had Nutrition class? [] Questions addressed Intervention    []  Pt has had Nutrition class? Intervention    Pt aware of:     [] Pulmonary eating techniques   [] Smart choices, Calories   []Gas-producing foods   [] Wt gain / loss strategies     GOALS    Weight Loss         30 DAY TARGET GOAL:    [x] Decrease portion size by 250-500 calories/day  [x] Increase fluid intake to 6-8 8oz. [x] Eat less sweets      Reasonable, achievable 30 day weight goal: 235 lbs   (1-2 lb per week is recommended)            Weight Gain        30 day   Target Goal:    [] increase proteins  [] add nutrition  Supplement  [] 6 small meals      Did pt meet Initial 30 day Target Goal(s)?:     New 30 DAY TARGET GOAL:    [] Decrease saturated fats  [] Decrease gas producing  cruciferous veggies   -  Reasonable, achievable 30 day weight goal:     Did pt meet previous 30 day Target   Goal(s)?:     New 30 DAY TARGET GOAL:    [] Switch to whole grains whenever possible  [] Decrease/ Eliminate carbonated beverages    Reasonable, achievable 30 day weight goal:    Did pt meet previous 30 day Target   Goal(s)?:      New 30 DAY TARGET GOAL:    [] Smaller meals more frequently  [] Decrease portion sizes by 25%      Reasonable, achievable 30 day weight goal:                  Did pt meet previous 30 day Target   Goal(s)?:                         [facility] INDIVIDUAL TREATMENT PLAN  PSYCHOSOCIAL DOMAIN:    Psychosocial   Initial Assessment  Day 1 Psychosocial   Intervention  Day 2-30 Psychosocial  Re-Assessment  Day 31-60 Psychosocial   Re-Assessment  Day 61-90+ Psychosocial Discharge  Final Day   Psychosocial Screening Tools    (X) PHQ-9 score: 12      (X) SF-36: 39       (X) UCSD SOB score: 97         PHQ-9 Score: If score >or =15, Action:     SF-36 Mental Score:     UCSD Score:             Any action on Screening Tools?:                  Psychosocial Screening   Tools    Repeat PHQ-9 Score if herminia:    Repeat SF-36 Anxiety Level:      -How would you rate your level of depression:       -How would you rate your mood: GOALS        Did pt meet previous 30 day Target Goal(s)?:      RE- ASSESSMENT GOAL    [] Decrease/  Maintain anxiety and depression level  [] Increase ability to complete ADL  -          (0 being 'None', 10 being 'Very'),  -How would you rate your Anxiety Level:      -How would you rate your level of depression:    -How would you rate your mood: GOALS        Did pt meet previous 30 day Target Goal(s)?:      RE- ASSESSMENT GOAL    [] Decrease/  Maintain anxiety and depression level  [] Increase ability to complete ADL  -          (0 being 'None', 10 being 'Very'),  -How would you rate your Anxiety Level:      -How would you rate your level of depression:    -How would you rate your mood:     Discharge            Did pts SF-36 Mental Score increase?:          [] Pt is aware of the s/s of depression    [] Received Psychosocial Education    Any follow up with physicians  necessary?:      [] Y    [] N               [facility] INDIVIDUAL TREATMENT PLAN  EDUCATION DOMAIN:    EDUCATION   Initial Assessment  Day 1 EDUCATION   Intervention  Day 2-30 EDUCATION   Re-Assessment  Day 31-60 EDUCATION   ReAssessment Day 61-90+ EDUCATION Discharge  Final Day                      Education  [x] Equipment/Staff Orientation  [x] Breathing Retraining  [x] Importance of Clean Hands    Chronic Cough?:   [x] Y   []  N  Spacer?:   [x]   Y   []  N    Sleep Apnea?:  [] Y    [x] N     [x] Education Book given       Education  Individual instruction  [] PLB  [] RPD/RPE   [] O2 use  []  review PFT  [] Inhalers   []Home Activity/Warm up/ stretches  Attend Classes:  [] Nutrition  [] Panic control, relaxation, managing depression  [] A&P of the Respiratory System   [] Environ. Issues+ Travel   [] Bronchial Hygiene / Preventing Infection  [] Resistance Training  []  Benefits of Exercise  [] Energy Cons        Education  Individual instruction  [] PLB  [] RPD/RPE   [] O2 use  []  review PFT  [] Inhalers   [] Home Activity/Warm up/ stretches  Attend Classes:  [] Nutrition  []  Panic conntrol, relaxation, managing depression  []  A&P of the Respiratory System   [] Environ. Issues+ Travel   [] Bronchial Hygiene / Preventing Infection  []  Resistance Training  [] Benefits of Exercise  [] Energy Cons    Education  Individual instruction  [] PLB  [] RPD/RPE   []  O2 use  []  review PFT  [] Inhalers   [] Home Activity/Warm up/ stretches  Attend Classes:  [] Nutrition  []  Panic conntrol, relaxation, managing depression  [] A&P of the Respiratory System   [] Environ. Issues+ Travel   [] Bronchial Hygiene / Preventing Infection  []  Resistance Training  [] Benefits of Exercise  [] Energy Cons   Education  []  Addressed pt questions on Education Topics                                                         Physician Interaction / Response:  (If none, continue on present care plan)     Physician Interaction / Response:   (If none, continue on present care plan)   Physician Interaction / Response:   (If none, continue on present care plan)   Physician Interaction / Response:   (If none, continue on present care plan)   Physician Interaction / Response:       Discharge the patient. Rehab Potential:  []  Good [] Fair [] Poor    Summary  Eileen Billy is a 41 yr old female with a hx of COPD, asthma and anxiety who was referred to Pulmonary Rehab for increased shortness of breath and deconditioning. She is currently smoking 10-12 cigarettes a day and based on recent 6 min walk results, requires oxygen continuously at 2 lpm. She states she would like to quit smoking and is interested in attending smoking cessation classes. She states she is unable to attend [facility] twice weekly due to financial reasons and lack of transportation, but agrees to 1 day/week. An exercise  plan will be developed for her to do at home. Patient is agreeable  .    Shannen Ayala will attend 28 Sessions of [facility] Phase 2     Exercise: 15-40 min of aerobic exercise. Time and intensity to be adjusted based on patient's RPE. Oxygen: [facility] will exercise on 2 lpm to maintain and Spo2 >88%. Medications:1. Albuterol MDI 2 puffs q 6hrs prn                       2. Albuterol HHN q 4hrs prn    Nutrition:Wt. 240.2 BMI 38.8. States she drinks several regular sodas daily. Cooks most of her own meals. Discussed avoiding soda and drinking more water, limiting sweets and eating more vegetables and protein. She will attend General Nutrition class. Psychosocial including Dyspnea with ADL's, Depression/Anxiety and Social Functioning: Bib[facility] admits to depression and  high levels of anxiety and states she has had panic attacks for years. She describes feeling more \"panicky\" over the past several weeks. States her depression is well controlled on Zoloft. She will attend Panic Control/Relaxation/Depression class. Other:                   Referring Physician: Audi Gonzalez NP                                          Fax #:[phone]    Reviewed and electronically signed by Dr Maureen Ruiz.  Andrew Victor, Medical Director

## 2018-03-06 ENCOUNTER — HOSPITAL ENCOUNTER (OUTPATIENT)
Dept: CARDIAC REHAB | Age: 48
Discharge: HOME | End: 2018-03-07

## 2018-03-06 VITALS — WEIGHT: 238.9 LBS | BODY MASS INDEX: 38.56 KG/M2

## 2018-03-06 NOTE — PROGRESS NOTES
Orthopnea                   Yes- sleeps with 3-4 pillows                 Sleep Disturbances     No                 Edema                         No                 Dyspnea                      Yes     Oxygen Therapy:                 Home O2          2 lpm              []  Prn                 [x] continuous                  []  HS                 [] CPAP []  BiPAP                 Company:                     Comments:     Occupational/ Recreational Activities:      Employment Status:    []  FT                  []  PT                  [x] Disabled         [] Retired                       Comments:                                         Occupation:                                                                            Hobbies/Leisure activities:Taking walks, spending time with daughters      Social/Spiritual:                   Social History   Social History            Social History    Marital status: Single       Spouse name: N/A    Number of children: 6    Years of education: N/A           Occupational History    on disability               Social History Main Topics    Smoking status: Current Every Day Smoker       Packs/day: 0.50       Years: 28.00       Types: Cigarettes       Start date: 1/1/1984    Smokeless tobacco: Never Used         Comment: states she is trying to work with patches but not helping    Alcohol use No    Drug use: No    Sexual activity: Yes       Partners: Male           Other Topics Concern    Not on file          Social History Narrative     One child at home     No grandchildren                                                Do you live alone:       []  Alone [x]  with spouse/family                                         Do you have stairs in your home        []  no      [x]  yes                                              Comment:12 steps to go up to bedroom                                         Transportation             []  drive self        [x]  Family/friend          Post Rehab   6 MWT  ft = % pred   METs  SpO2: %  MPH  HR:  bpm      RPD:  , RPE:                                        GOALS  List at least 2 patient specific goals     [x]Improve activity for tolerance for routine tasks and walking     [x]Learn proper breathing techniques including PLB     []Learn proper pacing with activities     [x]Learn proper use of oxygen, systems and safety     [x]Learn proper exercise guidelines     [x]Learn proper nutrition for my diagnosis     [x]Would like to be able to walk to park [place]                                   Learning Barrier(s)  [] Speech           [] Literacy              [] Hearing          [] Cognitive            [] Vision             [x]  Ready to Learn              Balance Issues  [] Y  [x] N                 Orthopedic Issues  []  Y[x]  N           Rate your Physical   Fitness (PF)?    4     Handgrip:  Right:27  Left:29     EDUCATION  [x]  Staff Introduction  [x]  Proper setup/O2 use  [x]  Equipment [place]  [x]  RPD/RPE Explain  [x]  SpO2/HR Explain  [x]  Breathing Retraining  [x]  Explain Intensity  [x] Initial Levels set GOALS                                                           If Patient has a Learning Barrier, action:                          If pt has balance or orthopedic issues:  Interventions:                             Rate your physical   fitness (PF)?:   /10           -30 day PF goal:  /10     EDUCATION  [] Understands proper set/up O2 use  []  Understands SpO2 and RPD? [] Aerobic progression explained?    []  Intensity progression explained?              GOALS                                                                                                                                      Rate your physical   fitness (PF)?:   /10           -30 day PF goal:  /10     EDUCATION   []  Home Activity education offered  [] Stretching/ Flexibility education offered  [] anxiety and depression level  [] Increase ability to complete ADL  [] Encourage pt to rate exercises truthfully to encourage correct intensity / duration prescription  -  (0 being 'None', 10 being 'Very'),  -How would you rate your Anxiety Level:        -How would you rate your level of depression:         -How would you rate your mood: GOALS           Did pt meet previous 30 day [employer] Goal(s)? :        RE- ASSESSMENT GOAL     [] Decrease/  Maintain anxiety and depression level  [] Increase ability to complete ADL  -              (0 being 'None', 10 being 'Very'),  -How would you rate your Anxiety Level:        -How would you rate your level of depression:     -How would you rate your mood: GOALS           Did pt meet previous 30 day [employer] Goal(s)? :        RE- ASSESSMENT GOAL     [] Decrease/  Maintain anxiety and depression level  [] Increase ability to complete ADL  -              (0 being 'None', 10 being 'Very'),  -How would you rate your Anxiety Level:        -How would you rate your level of depression:     -How would you rate your mood:       Discharge                 Did pts SF-36 Mental Score increase?:             [] Pt is aware of the s/s of depression     [] Received Psychosocial Education     Any follow up with physicians  necessary?:       [] Y    [] N                     [facility]'s INDIVIDUAL TREATMENT PLAN  EDUCATION DOMAIN:     EDUCATION   Initial Assessment  Day 1 EDUCATION   Intervention  Day 2-30 EDUCATION   Re-Assessment  Day 31-60 EDUCATION   ReAssessment Day 61-90+ EDUCATION Discharge  Final Day                           Education  [x] Equipment/Staff Orientation  [x] Breathing Retraining  [x] Importance of Clean Hands     Chronic Cough?:   [x] Y   []  N  Spacer?:   [x]   Y   []  N     Sleep Apnea?:  [] Y    [x] N      [x] Education Book given          Education  Individual instruction  [] PLB  [] RPD/RPE   [] O2 use  []  review PFT  [] Inhalers   []Home Activity/Warm up/ stretches  Attend

## 2018-03-13 ENCOUNTER — HOSPITAL ENCOUNTER (OUTPATIENT)
Dept: CARDIAC REHAB | Age: 48
Discharge: HOME | End: 2018-03-14

## 2018-03-13 VITALS — WEIGHT: 237.2 LBS | BODY MASS INDEX: 38.29 KG/M2

## 2018-03-20 ENCOUNTER — HOSPITAL ENCOUNTER (OUTPATIENT)
Dept: NON INVASIVE DIAGNOSTICS | Age: 48
Discharge: HOME | End: 2018-03-20
Attending: INTERNAL MEDICINE | Admitting: INTERNAL MEDICINE

## 2018-03-20 ENCOUNTER — HOSPITAL ENCOUNTER (OUTPATIENT)
Dept: CARDIAC REHAB | Age: 48
Discharge: HOME | End: 2018-03-21

## 2018-03-20 VITALS — BODY MASS INDEX: 38.45 KG/M2 | WEIGHT: 238.2 LBS

## 2018-03-20 DIAGNOSIS — J44.9 CHRONIC OBSTRUCTIVE PULMONARY DISEASE (HCC): ICD-10-CM

## 2018-03-20 LAB
LV EF: 60 %
LVEF MODALITY: NORMAL

## 2018-04-01 ENCOUNTER — HOSPITAL ENCOUNTER (OUTPATIENT)
Dept: OTHER | Age: 48
Discharge: HOME | End: 2018-04-30
Attending: NURSE PRACTITIONER | Admitting: NURSE PRACTITIONER

## 2018-05-24 ENCOUNTER — OFFICE VISIT (OUTPATIENT)
Dept: FAMILY MEDICINE CLINIC | Age: 48
End: 2018-05-24

## 2018-05-24 ENCOUNTER — OFFICE VISIT (OUTPATIENT)
Dept: PULMONOLOGY | Age: 48
End: 2018-05-24

## 2018-05-24 VITALS
OXYGEN SATURATION: 99 % | TEMPERATURE: 97 F | HEIGHT: 66 IN | BODY MASS INDEX: 37.9 KG/M2 | DIASTOLIC BLOOD PRESSURE: 78 MMHG | HEART RATE: 70 BPM | RESPIRATION RATE: 20 BRPM | WEIGHT: 235.8 LBS | SYSTOLIC BLOOD PRESSURE: 127 MMHG

## 2018-05-24 VITALS
RESPIRATION RATE: 20 BRPM | DIASTOLIC BLOOD PRESSURE: 84 MMHG | SYSTOLIC BLOOD PRESSURE: 122 MMHG | WEIGHT: 235.8 LBS | TEMPERATURE: 98.7 F | OXYGEN SATURATION: 98 % | HEIGHT: 66 IN | HEART RATE: 86 BPM | BODY MASS INDEX: 37.9 KG/M2

## 2018-05-24 DIAGNOSIS — F41.9 ANXIETY AND DEPRESSION: ICD-10-CM

## 2018-05-24 DIAGNOSIS — Z72.0 TOBACCO USE: ICD-10-CM

## 2018-05-24 DIAGNOSIS — G25.81 RLS (RESTLESS LEGS SYNDROME): ICD-10-CM

## 2018-05-24 DIAGNOSIS — E78.49 OTHER HYPERLIPIDEMIA: ICD-10-CM

## 2018-05-24 DIAGNOSIS — J96.11 CHRONIC RESPIRATORY FAILURE WITH HYPOXIA (HCC): ICD-10-CM

## 2018-05-24 DIAGNOSIS — G44.219 EPISODIC TENSION-TYPE HEADACHE, NOT INTRACTABLE: ICD-10-CM

## 2018-05-24 DIAGNOSIS — R73.03 PREDIABETES: ICD-10-CM

## 2018-05-24 DIAGNOSIS — G25.81 RLS (RESTLESS LEGS SYNDROME): Primary | ICD-10-CM

## 2018-05-24 DIAGNOSIS — R79.89 ABNORMAL TSH: ICD-10-CM

## 2018-05-24 DIAGNOSIS — F32.A ANXIETY AND DEPRESSION: ICD-10-CM

## 2018-05-24 DIAGNOSIS — J44.9 ASTHMA-COPD OVERLAP SYNDROME (HCC): Primary | ICD-10-CM

## 2018-05-24 PROBLEM — G47.9 SLEEP DISTURBANCE: Status: RESOLVED | Noted: 2018-05-24 | Resolved: 2018-05-24

## 2018-05-24 PROBLEM — G47.9 SLEEP DISTURBANCE: Status: ACTIVE | Noted: 2018-05-24

## 2018-05-24 LAB
FERRITIN: 19.1 NG/ML (ref 15–150)
TSH SERPL DL<=0.05 MIU/L-ACNC: 8.5 UIU/ML (ref 0.27–4.2)

## 2018-05-24 PROCEDURE — 99214 OFFICE O/P EST MOD 30 MIN: CPT | Performed by: INTERNAL MEDICINE

## 2018-05-24 PROCEDURE — G8427 DOCREV CUR MEDS BY ELIG CLIN: HCPCS | Performed by: FAMILY MEDICINE

## 2018-05-24 PROCEDURE — G8417 CALC BMI ABV UP PARAM F/U: HCPCS | Performed by: FAMILY MEDICINE

## 2018-05-24 PROCEDURE — G8428 CUR MEDS NOT DOCUMENT: HCPCS | Performed by: INTERNAL MEDICINE

## 2018-05-24 PROCEDURE — 4004F PT TOBACCO SCREEN RCVD TLK: CPT | Performed by: FAMILY MEDICINE

## 2018-05-24 PROCEDURE — 3023F SPIROM DOC REV: CPT | Performed by: INTERNAL MEDICINE

## 2018-05-24 PROCEDURE — 4004F PT TOBACCO SCREEN RCVD TLK: CPT | Performed by: INTERNAL MEDICINE

## 2018-05-24 PROCEDURE — G8926 SPIRO NO PERF OR DOC: HCPCS | Performed by: INTERNAL MEDICINE

## 2018-05-24 PROCEDURE — 99214 OFFICE O/P EST MOD 30 MIN: CPT | Performed by: FAMILY MEDICINE

## 2018-05-24 PROCEDURE — G8417 CALC BMI ABV UP PARAM F/U: HCPCS | Performed by: INTERNAL MEDICINE

## 2018-05-24 RX ORDER — GABAPENTIN 300 MG/1
300 CAPSULE ORAL NIGHTLY
Qty: 90 CAPSULE | Refills: 0 | Status: SHIPPED | OUTPATIENT
Start: 2018-05-24 | End: 2018-08-22

## 2018-05-24 RX ORDER — BUDESONIDE AND FORMOTEROL FUMARATE DIHYDRATE 160; 4.5 UG/1; UG/1
2 AEROSOL RESPIRATORY (INHALATION) 2 TIMES DAILY
Qty: 1 INHALER | Refills: 3 | Status: SHIPPED | OUTPATIENT
Start: 2018-05-24 | End: 2018-05-27 | Stop reason: ALTCHOICE

## 2018-05-24 RX ORDER — BUDESONIDE AND FORMOTEROL FUMARATE DIHYDRATE 160; 4.5 UG/1; UG/1
2 AEROSOL RESPIRATORY (INHALATION) 2 TIMES DAILY
Qty: 1 INHALER | Refills: 0 | COMMUNITY
Start: 2018-05-24 | End: 2018-05-27 | Stop reason: ALTCHOICE

## 2018-05-24 RX ORDER — ALBUTEROL SULFATE 90 UG/1
2 AEROSOL, METERED RESPIRATORY (INHALATION) EVERY 4 HOURS PRN
Qty: 1 INHALER | Refills: 5 | Status: SHIPPED | OUTPATIENT
Start: 2018-05-24

## 2018-05-24 RX ORDER — IBUPROFEN 800 MG/1
800 TABLET ORAL DAILY PRN
Qty: 30 TABLET | Refills: 1 | Status: SHIPPED | OUTPATIENT
Start: 2018-05-24

## 2018-05-24 ASSESSMENT — ENCOUNTER SYMPTOMS
SHORTNESS OF BREATH: 0
RHINORRHEA: 0

## 2018-05-25 LAB
ESTIMATED AVERAGE GLUCOSE: 122.6 MG/DL
HBA1C MFR BLD: 5.9 %

## 2018-05-30 DIAGNOSIS — E03.9 HYPOTHYROIDISM, UNSPECIFIED TYPE: Primary | ICD-10-CM

## 2018-05-30 DIAGNOSIS — G25.81 RLS (RESTLESS LEGS SYNDROME): ICD-10-CM

## 2018-05-30 PROBLEM — R79.89 ABNORMAL TSH: Status: RESOLVED | Noted: 2018-02-26 | Resolved: 2018-05-30

## 2018-05-30 RX ORDER — LANOLIN ALCOHOL/MO/W.PET/CERES
325 CREAM (GRAM) TOPICAL
Qty: 90 TABLET | Refills: 3 | Status: SHIPPED | OUTPATIENT
Start: 2018-05-30

## 2018-05-30 RX ORDER — LEVOTHYROXINE SODIUM 0.05 MG/1
50 TABLET ORAL DAILY
Qty: 30 TABLET | Refills: 2 | Status: SHIPPED | OUTPATIENT
Start: 2018-05-30

## 2018-05-31 ENCOUNTER — OFFICE VISIT (OUTPATIENT)
Dept: ORTHOPEDIC SURGERY | Age: 48
End: 2018-05-31

## 2018-05-31 VITALS
HEART RATE: 71 BPM | BODY MASS INDEX: 38.09 KG/M2 | WEIGHT: 237 LBS | HEIGHT: 66 IN | DIASTOLIC BLOOD PRESSURE: 95 MMHG | SYSTOLIC BLOOD PRESSURE: 145 MMHG | TEMPERATURE: 98.8 F | RESPIRATION RATE: 16 BRPM

## 2018-05-31 DIAGNOSIS — G89.29 CHRONIC PAIN OF RIGHT KNEE: Primary | ICD-10-CM

## 2018-05-31 DIAGNOSIS — M25.561 CHRONIC PAIN OF RIGHT KNEE: Primary | ICD-10-CM

## 2018-05-31 DIAGNOSIS — M17.11 ARTHRITIS OF RIGHT KNEE: ICD-10-CM

## 2018-05-31 PROCEDURE — 20610 DRAIN/INJ JOINT/BURSA W/O US: CPT | Performed by: PHYSICIAN ASSISTANT

## 2018-05-31 PROCEDURE — 99202 OFFICE O/P NEW SF 15 MIN: CPT | Performed by: PHYSICIAN ASSISTANT

## 2018-05-31 PROCEDURE — 4004F PT TOBACCO SCREEN RCVD TLK: CPT | Performed by: PHYSICIAN ASSISTANT

## 2018-05-31 PROCEDURE — G8417 CALC BMI ABV UP PARAM F/U: HCPCS | Performed by: PHYSICIAN ASSISTANT

## 2018-05-31 PROCEDURE — G8427 DOCREV CUR MEDS BY ELIG CLIN: HCPCS | Performed by: PHYSICIAN ASSISTANT

## 2018-06-27 ENCOUNTER — TELEPHONE (OUTPATIENT)
Dept: ORTHOPEDIC SURGERY | Age: 48
End: 2018-06-27

## 2018-06-27 ENCOUNTER — OFFICE VISIT (OUTPATIENT)
Dept: ORTHOPEDIC SURGERY | Age: 48
End: 2018-06-27

## 2018-06-27 VITALS
DIASTOLIC BLOOD PRESSURE: 91 MMHG | WEIGHT: 237 LBS | HEART RATE: 71 BPM | HEIGHT: 66 IN | BODY MASS INDEX: 38.09 KG/M2 | SYSTOLIC BLOOD PRESSURE: 141 MMHG | TEMPERATURE: 97.2 F

## 2018-06-27 DIAGNOSIS — M17.11 ARTHRITIS OF RIGHT KNEE: Primary | ICD-10-CM

## 2018-06-27 DIAGNOSIS — S83.241A ACUTE MEDIAL MENISCAL TEAR, RIGHT, INITIAL ENCOUNTER: ICD-10-CM

## 2018-06-27 PROCEDURE — G8417 CALC BMI ABV UP PARAM F/U: HCPCS | Performed by: PHYSICIAN ASSISTANT

## 2018-06-27 PROCEDURE — 4004F PT TOBACCO SCREEN RCVD TLK: CPT | Performed by: PHYSICIAN ASSISTANT

## 2018-06-27 PROCEDURE — G8427 DOCREV CUR MEDS BY ELIG CLIN: HCPCS | Performed by: PHYSICIAN ASSISTANT

## 2018-06-27 PROCEDURE — 99213 OFFICE O/P EST LOW 20 MIN: CPT | Performed by: PHYSICIAN ASSISTANT

## 2018-06-28 ENCOUNTER — TELEPHONE (OUTPATIENT)
Dept: FAMILY MEDICINE CLINIC | Age: 48
End: 2018-06-28

## 2018-06-28 NOTE — TELEPHONE ENCOUNTER
Pt called and said she is going to have a mri done on her leg on July 7 and would like something called in cause she is claustrophobic and she also wants pain meds for the pain in her leg    Please advise

## 2018-06-28 NOTE — TELEPHONE ENCOUNTER
Dr. Ever Shaffer started her on gabapentin. If not helping, take 1 3 h before bed and a second 1 1 h before bed.

## 2018-06-28 NOTE — TELEPHONE ENCOUNTER
Please Advise. .. I know Dr. Atif Dickinson can call her in something next week for her to take before her MRI if he feels it is needed, but can pt have anything right now for her RLS?     Thank You

## 2018-07-07 ENCOUNTER — HOSPITAL ENCOUNTER (OUTPATIENT)
Dept: MRI IMAGING | Age: 48
Discharge: HOME | End: 2018-07-07
Attending: PHYSICIAN ASSISTANT | Admitting: PHYSICIAN ASSISTANT

## 2018-07-07 DIAGNOSIS — S83.241A OTHER TEAR OF MEDIAL MENISCUS, CURRENT INJURY, RIGHT KNEE, INITIAL ENCOUNTER: ICD-10-CM

## 2018-07-07 DIAGNOSIS — S83.241A ACUTE MEDIAL MENISCAL TEAR, RIGHT, INITIAL ENCOUNTER: ICD-10-CM

## 2018-07-20 ENCOUNTER — TELEPHONE (OUTPATIENT)
Dept: ORTHOPEDIC SURGERY | Age: 48
End: 2018-07-20

## 2018-07-23 NOTE — TELEPHONE ENCOUNTER
Called patient. Per Judy Rose she has minimal arthritis, no meniscal tears. She needs to follow up with Dr. Usman Tafoya to discuss treatment. She is following up on Thursday.

## 2018-08-06 ENCOUNTER — OFFICE VISIT (OUTPATIENT)
Dept: ORTHOPEDIC SURGERY | Age: 48
End: 2018-08-06

## 2018-08-06 VITALS
SYSTOLIC BLOOD PRESSURE: 131 MMHG | BODY MASS INDEX: 38.09 KG/M2 | DIASTOLIC BLOOD PRESSURE: 88 MMHG | HEART RATE: 86 BPM | HEIGHT: 66 IN | WEIGHT: 237 LBS | TEMPERATURE: 96.4 F

## 2018-08-06 DIAGNOSIS — M17.11 ARTHRITIS OF RIGHT KNEE: Primary | ICD-10-CM

## 2018-08-06 PROCEDURE — 99212 OFFICE O/P EST SF 10 MIN: CPT | Performed by: PHYSICIAN ASSISTANT

## 2018-08-06 PROCEDURE — G8417 CALC BMI ABV UP PARAM F/U: HCPCS | Performed by: PHYSICIAN ASSISTANT

## 2018-08-06 PROCEDURE — G8427 DOCREV CUR MEDS BY ELIG CLIN: HCPCS | Performed by: PHYSICIAN ASSISTANT

## 2018-08-06 PROCEDURE — 4004F PT TOBACCO SCREEN RCVD TLK: CPT | Performed by: PHYSICIAN ASSISTANT

## 2018-08-07 NOTE — PROGRESS NOTES
questions were answered. I am recommending repeat cortisone injection for the right knee pain. Risk and benefits of corticosteroid intra-articular injection was discussed today. All questions were answered to her satisfaction. She verbally consented to proceed with intra-articular injection today. Cortisone Injection                                                       PROCEDURE NOTE:     Pre op Diagnosis:  right knee pain     Post op Diagnosis: Same  With the patient's permission, her right knee was prepped  in standard sterile fashion with  Alcohol and 2 cc of 0.25% Marcaine and 1 cc of Kenalog 40 mg was injected into the right lateral compartment  without difficulty. The patient tolerated this well without difficulty. A band-aid was applied. The patient was advised to ice the knee for 15-20 minutes to relieve any injection site related pain. Follow Up:   Call or return to clinic prn if these symptoms worsen or fail to improve as anticipated.

## 2018-09-26 ENCOUNTER — TELEPHONE (OUTPATIENT)
Dept: PULMONOLOGY | Age: 48
End: 2018-09-26

## 2018-09-27 NOTE — TELEPHONE ENCOUNTER
Left message for patient to call her insurance company and see what inhalers are on her plan for her Asthma

## 2018-10-01 ENCOUNTER — OFFICE VISIT (OUTPATIENT)
Dept: ORTHOPEDIC SURGERY | Age: 48
End: 2018-10-01
Payer: COMMERCIAL

## 2018-10-01 VITALS — HEIGHT: 66 IN | TEMPERATURE: 96.9 F | BODY MASS INDEX: 38.09 KG/M2 | WEIGHT: 237 LBS

## 2018-10-01 DIAGNOSIS — M17.11 ARTHRITIS OF RIGHT KNEE: Primary | ICD-10-CM

## 2018-10-01 PROCEDURE — 99213 OFFICE O/P EST LOW 20 MIN: CPT | Performed by: ORTHOPAEDIC SURGERY

## 2018-10-01 PROCEDURE — G8427 DOCREV CUR MEDS BY ELIG CLIN: HCPCS | Performed by: ORTHOPAEDIC SURGERY

## 2018-10-01 PROCEDURE — 4004F PT TOBACCO SCREEN RCVD TLK: CPT | Performed by: ORTHOPAEDIC SURGERY

## 2018-10-01 PROCEDURE — G8417 CALC BMI ABV UP PARAM F/U: HCPCS | Performed by: ORTHOPAEDIC SURGERY

## 2018-10-01 PROCEDURE — G8484 FLU IMMUNIZE NO ADMIN: HCPCS | Performed by: ORTHOPAEDIC SURGERY

## 2019-06-14 ENCOUNTER — TELEPHONE (OUTPATIENT)
Dept: PULMONOLOGY | Age: 49
End: 2019-06-14

## 2019-06-14 NOTE — TELEPHONE ENCOUNTER
Phone call to patient regarding need for follow up appointment. She stated she has moved to [place] and has Michael Kiala. Patient was advised [facility] doesn't take that and she will have to find a pulmonologist in [place]. She will call her  and gets some names on her plan and let us know.

## 2019-06-24 ENCOUNTER — TELEPHONE (OUTPATIENT)
Dept: PULMONOLOGY | Age: 49
End: 2019-06-24

## 2019-06-24 NOTE — TELEPHONE ENCOUNTER
Message left for [facility] Disability rep to return our call regarding records request regarding disability determination on Bibi. Patient has not been seen since may of 2018. Request sent to [facility] for any records up to that time. Patient has missed to scheduled appts since last May.

## 2021-03-17 LAB — MAMMOGRAPHY, EXTERNAL: NORMAL

## 2022-11-06 ENCOUNTER — HOSPITAL ENCOUNTER (EMERGENCY)
Age: 52
Discharge: HOME | End: 2022-11-06
Attending: EMERGENCY MEDICINE

## 2022-11-06 ENCOUNTER — APPOINTMENT (OUTPATIENT)
Dept: GENERAL RADIOLOGY | Age: 52
End: 2022-11-06

## 2022-11-06 VITALS
TEMPERATURE: 97.8 F | RESPIRATION RATE: 20 BRPM | HEIGHT: 66 IN | SYSTOLIC BLOOD PRESSURE: 155 MMHG | DIASTOLIC BLOOD PRESSURE: 91 MMHG | OXYGEN SATURATION: 96 % | WEIGHT: 225.31 LBS | HEART RATE: 77 BPM | BODY MASS INDEX: 36.21 KG/M2

## 2022-11-06 DIAGNOSIS — R05.1 ACUTE COUGH: ICD-10-CM

## 2022-11-06 DIAGNOSIS — Z72.0 TOBACCO ABUSE: ICD-10-CM

## 2022-11-06 DIAGNOSIS — J02.9 ACUTE PHARYNGITIS, UNSPECIFIED ETIOLOGY: Primary | ICD-10-CM

## 2022-11-06 LAB
RAPID INFLUENZA  B AGN: NEGATIVE
RAPID INFLUENZA A AGN: NEGATIVE
SARS-COV-2, NAAT: NOT DETECTED

## 2022-11-06 PROCEDURE — 6370000000 HC RX 637 (ALT 250 FOR IP): Performed by: EMERGENCY MEDICINE

## 2022-11-06 PROCEDURE — 6360000002 HC RX W HCPCS: Performed by: EMERGENCY MEDICINE

## 2022-11-06 PROCEDURE — 87804 INFLUENZA ASSAY W/OPTIC: CPT

## 2022-11-06 PROCEDURE — 87635 SARS-COV-2 COVID-19 AMP PRB: CPT

## 2022-11-06 PROCEDURE — 71046 X-RAY EXAM CHEST 2 VIEWS: CPT

## 2022-11-06 PROCEDURE — 99284 EMERGENCY DEPT VISIT MOD MDM: CPT

## 2022-11-06 RX ORDER — BENZONATATE 100 MG/1
100 CAPSULE ORAL 3 TIMES DAILY PRN
Status: DISCONTINUED | OUTPATIENT
Start: 2022-11-06 | End: 2022-11-06

## 2022-11-06 RX ORDER — DEXAMETHASONE SODIUM PHOSPHATE 4 MG/ML
10 INJECTION, SOLUTION INTRA-ARTICULAR; INTRALESIONAL; INTRAMUSCULAR; INTRAVENOUS; SOFT TISSUE ONCE
Status: COMPLETED | OUTPATIENT
Start: 2022-11-06 | End: 2022-11-06

## 2022-11-06 RX ORDER — BENZONATATE 100 MG/1
100-200 CAPSULE ORAL 3 TIMES DAILY PRN
Qty: 60 CAPSULE | Refills: 0 | Status: SHIPPED | OUTPATIENT
Start: 2022-11-06 | End: 2022-11-13

## 2022-11-06 RX ORDER — ACETAMINOPHEN 500 MG
1000 TABLET ORAL ONCE
Status: COMPLETED | OUTPATIENT
Start: 2022-11-06 | End: 2022-11-06

## 2022-11-06 RX ADMIN — ACETAMINOPHEN 1000 MG: 500 TABLET ORAL at 14:19

## 2022-11-06 RX ADMIN — DEXAMETHASONE SODIUM PHOSPHATE 10 MG: 4 INJECTION, SOLUTION INTRAMUSCULAR; INTRAVENOUS at 14:20

## 2022-11-06 RX ADMIN — BENZONATATE 100 MG: 100 CAPSULE ORAL at 14:19

## 2022-11-06 NOTE — ED PROVIDER NOTES
[address] [facility]    CHIEF COMPLAINT  Cough (X3wks, with pharyngitis)       HISTORY OF PRESENT ILLNESS  Kaykay Mcmahan is a 50 y.o. female who presents to the ED with complaint of sore throat. Symptoms started three weeks ago. Denies fever, chest pain. Complains of SOB. Denies n/v/d. Denies dysuria, hematuria. Smokes 1/2 ppd. Trying to quit. Cough nonproductive. No sick contacts or recent travel. No other complaints, modifying factors or associated symptoms.      I have reviewed the following from the nursing documentation:    Past Medical History:   Diagnosis Date    Anxiety     Asthma     COPD (chronic obstructive pulmonary disease) ([facility] [address]     Depression     Endometriosis      Past Surgical History:   Procedure Laterality Date     SECTION      ENDOMETRIAL ABLATION      HERNIA REPAIR      HYSTERECTOMY (CERVIX STATUS UNKNOWN)      INCONTINENCE SURGERY  2017    KNEE ARTHROSCOPY Right     2012    TONSILLECTOMY       Family History   Problem Relation Age of Onset    Other Mother 52        heart failure    Diabetes Mother     Heart Disease Mother     Alcohol Abuse Father     Diabetes Brother     Other Brother         drug addiction    Other Brother         drug addiction    Other Brother         drug addiction     Social History     Socioeconomic History    Marital status: Single     Spouse name: Not on file    Number of children: 8    Years of education: Not on file    Highest education level: Not on file   Occupational History    Occupation: on disability   Tobacco Use    Smoking status: Every Day     Packs/day: 0.50     Years: 28.00     Pack years: 14.00     Types: Cigarettes     Start date: 1984    Smokeless tobacco: Never    Tobacco comments:     states she is trying to work with patches but not helping   Vaping Use    Vaping Use: Never used   Substance and Sexual Activity    Alcohol use: No     Alcohol/week: 0.0 standard drinks    Drug use: No    Sexual activity: Yes Partners: Male   Other Topics Concern    Not on file   Social History Narrative    One child at home    No grandchildren     Social Determinants of Health     Financial Resource Strain: Not on file   Food Insecurity: Not on file   Transportation Needs: Not on file   Physical Activity: Not on file   Stress: Not on file   Social Connections: Not on file   Intimate Partner Violence: Not on file   Housing Stability: Not on file     No current facility-administered medications for this encounter. Current Outpatient Medications   Medication Sig Dispense Refill    benzonatate (TESSALON) 100 MG capsule Take 1-2 capsules by mouth 3 times daily as needed for Cough 60 capsule 0    levothyroxine (SYNTHROID) 50 MCG tablet Take 1 tablet by mouth Daily 30 tablet 2    ferrous sulfate (FE TABS) 325 (65 Fe) MG EC tablet Take 1 tablet by mouth daily (with breakfast) 90 tablet 3    tiotropium (SPIRIVA) 18 MCG inhalation capsule Inhale 18 mcg into the lungs daily      OXYGEN Inhale 2 L into the lungs      albuterol sulfate HFA (PROAIR HFA) 108 (90 Base) MCG/ACT inhaler Inhale 2 puffs into the lungs every 4 hours as needed for Wheezing or Shortness of Breath 1 Inhaler 5    gabapentin (NEURONTIN) 300 MG capsule Take 1 capsule by mouth nightly for 90 days. . 90 capsule 0    ibuprofen (ADVIL;MOTRIN) 800 MG tablet Take 1 tablet by mouth daily as needed for Pain 30 tablet 1    sertraline (ZOLOFT) 50 MG tablet Take 1 tablet by mouth daily 30 tablet 5    albuterol (PROVENTIL) (2.5 MG/3ML) 0.083% nebulizer solution Take 3 mLs by nebulization every 4 hours as needed for Wheezing or Shortness of Breath 180 mL 11     No Known Allergies    REVIEW OF SYSTEMS  10 systems reviewed, pertinent positives and negatives per HPI, otherwise noted to be negative.     PHYSICAL EXAM  ED Triage Vitals [11/06/22 1405]   Enc Vitals Group      BP (!) 155/91      Heart Rate 77      Resp 20      Temp 97.8 °F (36.6 °C)      Temp Source Oral      SpO2 96 %      Weight 225 lb 5 oz (102.2 kg)      Height 5' 6\" (1.676 m)      Head Circumference       Peak Flow       Pain Score       Pain Loc       Pain Edu? Excl. in [address]? General appearance: Awake and alert. Cooperative. No acute distress. HENT: Normocephalic. Atraumatic. Mucous membranes are moist.  Oropharynx is clear. There is no tonsillar exudate, focal erythema, or uvular deviation. Managing secretions easily. Neck: Supple. No meningismus. Eyes: PERRL. EOMI. Heart/Chest: RRR. No murmurs. Lungs: Respirations unlabored. CTAB. Good air exchange. Speaking comfortably in full sentences. Frequent cough. Abdomen: Soft. Non-tender. Non-distended. No rebound or guarding. Musculoskeletal: No extremity edema. No deformity. No tenderness in the extremities. All extremities neurovascularly intact. Skin: Warm and dry. No acute rashes. Neurological: Alert and oriented. CN II-XII intact. Gait normal.  Psychiatric: Mood/affect: normal      LABS  I have reviewed all labs for this visit. Results for orders placed or performed during the hospital encounter of 11/06/22   Rapid influenza A/B antigens    Specimen: Nasopharyngeal   Result Value Ref Range    Rapid Influenza A Ag Negative Negative    Rapid Influenza B Ag Negative Negative   COVID-19, Rapid    Specimen: Nasopharyngeal Swab   Result Value Ref Range    SARS-CoV-2, NAAT Not Detected Not Detected       RADIOLOGY  I have reviewed all radiographic studies for this visit. XR CHEST (2 VW)   Final Result   1. Mild to moderate peribronchial cuffing potentially due to reactive airways   disease or bronchitis. 2. Pulmonary hyperinflation that can be seen with asthma, emphysema, or other   etiologies. ED COURSE/MDM  Patient seen and evaluated. Old records reviewed. Labs and imaging reviewed and results discussed with patient/family to extent possible.      50 y.o. female presents with signs and symptoms of upper respiratory infection.  The patient is afebrile and nontoxic in appearance. Managing secretions without difficulty. Presentation not consistent with epiglottitis or retropharyngeal abscess. There is no asymmetric tonsillar erythema or uvular deviation and I am not concerned for peritonsillar abscess. There is no pain with manipulation of the trachea and I am not concerned for bacterial tracheitis. No meningismus - not concerning for meningitis. Lungs are clear to auscultation and no infiltrate on CXR - not pneumonia. Rapid flu and COVID tests are negative. Patient was treated with oral dexamethasone and acetaminophen, to good effect. Discharged home with supportive care and expectant management and usual strict return precautions for new or worsening symptoms. Close follow-up with PCP in several days. Advised smoking cessation. Is this patient to be included in the SEP-1 Core Measure? No   Exclusion criteria - the patient is NOT to be included for SEP-1 Core Measure due to:  Viral etiology found or highly suspected (including COVID-19) without concomitant bacterial infection    IDanuta MD, am the primary clinician of record. During the patient's ED course, the patient was given:  Medications   Dexamethasone Sodium Phosphate injection 10 mg (10 mg Oral Given 11/6/22 1420)   acetaminophen (TYLENOL) tablet 1,000 mg (1,000 mg Oral Given 11/6/22 1419)        All questions were answered and the patient/family expressed understanding and agreement with the plan. PROCEDURES  Smoking cessation counseling    Indication: nicotine dependence  Greater than 5 minutes of smoking cessation counseling was provided to the patient by myself at bedside     Abiel [id number]  1. Acute pharyngitis, unspecified etiology    2. Acute cough    3. Tobacco abuse        DISPOSITION   discharge     Danuta Ferguson MD    Note: This chart was created using voice recognition dictation software.  Efforts were made by me to ensure accuracy, however some errors may be present due to limitations of this technology and occasionally words are not transcribed correctly.        Juliette Helms MD  11/06/22 9772

## 2022-11-16 ENCOUNTER — TELEPHONE (OUTPATIENT)
Dept: FAMILY MEDICINE CLINIC | Age: 52
End: 2022-11-16

## 2023-03-19 ENCOUNTER — APPOINTMENT (OUTPATIENT)
Dept: GENERAL RADIOLOGY | Age: 53
End: 2023-03-19
Payer: MEDICAID

## 2023-03-19 ENCOUNTER — HOSPITAL ENCOUNTER (EMERGENCY)
Age: 53
Discharge: HOME | End: 2023-03-19
Attending: EMERGENCY MEDICINE
Payer: MEDICAID

## 2023-03-19 VITALS
HEIGHT: 66 IN | SYSTOLIC BLOOD PRESSURE: 148 MMHG | DIASTOLIC BLOOD PRESSURE: 87 MMHG | RESPIRATION RATE: 20 BRPM | WEIGHT: 236.77 LBS | HEART RATE: 78 BPM | TEMPERATURE: 97.9 F | BODY MASS INDEX: 38.05 KG/M2 | OXYGEN SATURATION: 95 %

## 2023-03-19 DIAGNOSIS — M79.671 RIGHT FOOT PAIN: Primary | ICD-10-CM

## 2023-03-19 PROCEDURE — 6360000002 HC RX W HCPCS: Performed by: EMERGENCY MEDICINE

## 2023-03-19 PROCEDURE — 96372 THER/PROPH/DIAG INJ SC/IM: CPT

## 2023-03-19 PROCEDURE — 73630 X-RAY EXAM OF FOOT: CPT

## 2023-03-19 PROCEDURE — 99284 EMERGENCY DEPT VISIT MOD MDM: CPT

## 2023-03-19 RX ORDER — IBUPROFEN 400 MG/1
400 TABLET ORAL ONCE
Status: DISCONTINUED | OUTPATIENT
Start: 2023-03-19 | End: 2023-03-19 | Stop reason: HOSPADM

## 2023-03-19 RX ORDER — KETOROLAC TROMETHAMINE 15 MG/ML
15 INJECTION, SOLUTION INTRAMUSCULAR; INTRAVENOUS ONCE
Status: COMPLETED | OUTPATIENT
Start: 2023-03-19 | End: 2023-03-19

## 2023-03-19 RX ORDER — LIDOCAINE 50 MG/G
1 PATCH TOPICAL DAILY
Qty: 10 PATCH | Refills: 0 | Status: SHIPPED | OUTPATIENT
Start: 2023-03-19 | End: 2023-03-29

## 2023-03-19 RX ADMIN — KETOROLAC TROMETHAMINE 15 MG: 15 INJECTION, SOLUTION INTRAMUSCULAR; INTRAVENOUS at 16:52

## 2023-03-19 ASSESSMENT — PAIN DESCRIPTION - LOCATION
LOCATION: FOOT
LOCATION: ANKLE
LOCATION: FOOT

## 2023-03-19 ASSESSMENT — PAIN - FUNCTIONAL ASSESSMENT: PAIN_FUNCTIONAL_ASSESSMENT: 0-10

## 2023-03-19 ASSESSMENT — PAIN DESCRIPTION - ORIENTATION
ORIENTATION: RIGHT
ORIENTATION: RIGHT

## 2023-03-19 ASSESSMENT — PAIN SCALES - GENERAL
PAINLEVEL_OUTOF10: 5
PAINLEVEL_OUTOF10: 7
PAINLEVEL_OUTOF10: 6

## 2023-03-19 ASSESSMENT — ENCOUNTER SYMPTOMS
ABDOMINAL PAIN: 0
SHORTNESS OF BREATH: 0

## 2023-03-19 NOTE — Clinical Note
Huy Harper was seen and treated in our emergency department on 3/19/2023. She may return to work on 03/22/2023. If you have any questions or concerns, please don't hesitate to call.       Alaina Leyden, MD

## 2023-03-19 NOTE — ED PROVIDER NOTES
patches but not helping   Vaping Use    Vaping Use: Never used   Substance and Sexual Activity    Alcohol use: No     Alcohol/week: 0.0 standard drinks    Drug use: No    Sexual activity: Yes     Partners: Male   Social History Narrative    One child at home    No grandchildren       SCREENINGS         Jonathan Coma Scale  Eye Opening: Spontaneous  Best Verbal Response: Oriented  Best Motor Response: Obeys commands  [place]wood Coma Scale Score: 15                     CIWA Assessment  BP: (!) 148/87 (RN notified)  Heart Rate: 78                 PHYSICAL EXAM    (up to 7 for level 4, 8 or more for level 5)     ED Triage Vitals [03/19/23 1632]   BP Temp Temp Source Heart Rate Resp SpO2 Height Weight   (!) 148/87 97.9 °F (36.6 °C) Oral 78 20 95 % 5' 6\" (1.676 m) 236 lb 12.4 oz (107.4 kg)       Physical Exam  Constitutional:       Appearance: Normal appearance. HENT:      Head: Normocephalic and atraumatic. Right Ear: External ear normal.      Left Ear: External ear normal.      Mouth/Throat:      Mouth: Mucous membranes are moist.   Eyes:      Extraocular Movements: Extraocular movements intact. Pupils: Pupils are equal, round, and reactive to light. Cardiovascular:      Rate and Rhythm: Normal rate. Pulses:           Dorsalis pedis pulses are 2+ on the right side and 2+ on the left side. Posterior tibial pulses are 2+ on the right side and 2+ on the left side. Pulmonary:      Effort: Pulmonary effort is normal.   Abdominal:      Palpations: Abdomen is soft. Musculoskeletal:         General: Normal range of motion. Feet:      Right foot:      Skin integrity: Skin integrity normal.      Left foot:      Skin integrity: Skin integrity normal.   Skin:     General: Skin is warm and dry. Capillary Refill: Capillary refill takes less than 2 seconds. Neurological:      Mental Status: She is alert and oriented to person, place, and time.    Psychiatric:         Mood and Affect: Mood normal.

## 2023-03-19 NOTE — DISCHARGE INSTRUCTIONS
1.  Medications as directed. 2.  Crutches as needed. 3.  Follow-up with podiatry call for an appointment. 4.  Avoid prolonged weightbearing in the right foot.   5.  Return emerged department for severe intractable pain or signs of infection

## 2023-03-19 NOTE — ED NOTES
Crutches fitted and demo given. Return demo correctly. AVS reviewed with patient. Verbalized understanding. AVS was printed and given to patient. Prescriptions sent electronically to patients pharmacy of choice.      Lexa Olivia RN (Tracee)  03/19/23 [id number]

## 2023-05-08 ENCOUNTER — APPOINTMENT (OUTPATIENT)
Dept: GENERAL RADIOLOGY | Age: 53
End: 2023-05-08
Payer: MEDICAID

## 2023-05-08 ENCOUNTER — HOSPITAL ENCOUNTER (EMERGENCY)
Age: 53
Discharge: HOME | End: 2023-05-08
Attending: EMERGENCY MEDICINE
Payer: MEDICAID

## 2023-05-08 VITALS
SYSTOLIC BLOOD PRESSURE: 136 MMHG | DIASTOLIC BLOOD PRESSURE: 88 MMHG | TEMPERATURE: 98.3 F | RESPIRATION RATE: 17 BRPM | HEIGHT: 66 IN | WEIGHT: 237 LBS | BODY MASS INDEX: 38.09 KG/M2 | OXYGEN SATURATION: 94 % | HEART RATE: 75 BPM

## 2023-05-08 DIAGNOSIS — M25.561 ACUTE PAIN OF RIGHT KNEE: Primary | ICD-10-CM

## 2023-05-08 PROCEDURE — 73562 X-RAY EXAM OF KNEE 3: CPT

## 2023-05-08 PROCEDURE — 6370000000 HC RX 637 (ALT 250 FOR IP): Performed by: EMERGENCY MEDICINE

## 2023-05-08 PROCEDURE — 99283 EMERGENCY DEPT VISIT LOW MDM: CPT

## 2023-05-08 RX ORDER — NAPROXEN 250 MG/1
500 TABLET ORAL ONCE
Status: COMPLETED | OUTPATIENT
Start: 2023-05-08 | End: 2023-05-08

## 2023-05-08 RX ORDER — NAPROXEN 500 MG/1
500 TABLET ORAL 2 TIMES DAILY
Qty: 15 TABLET | Refills: 0 | Status: SHIPPED | OUTPATIENT
Start: 2023-05-08

## 2023-05-08 RX ADMIN — NAPROXEN SODIUM 500 MG: 250 TABLET ORAL at 20:59

## 2023-05-08 ASSESSMENT — PAIN DESCRIPTION - LOCATION: LOCATION: KNEE

## 2023-05-08 ASSESSMENT — PAIN DESCRIPTION - DESCRIPTORS: DESCRIPTORS: ACHING

## 2023-05-08 ASSESSMENT — PAIN SCALES - GENERAL
PAINLEVEL_OUTOF10: 5
PAINLEVEL_OUTOF10: 8

## 2023-05-08 ASSESSMENT — PAIN - FUNCTIONAL ASSESSMENT: PAIN_FUNCTIONAL_ASSESSMENT: 0-10

## 2023-05-08 ASSESSMENT — PAIN DESCRIPTION - ORIENTATION: ORIENTATION: RIGHT

## 2023-05-09 NOTE — DISCHARGE INSTRUCTIONS
Follow-up with your orthopedic surgeon in 1 to 2 days for reexamination. Call today for an appointment. Recommend minimal weightbearing. Use ice to the affected area. Avoid heat or heating pads. If condition worsens or new symptoms develop, return immediately to the emergency department.

## 2023-05-09 NOTE — ED PROVIDER NOTES
mis-transcribed. )    1859 Luisito Curiel DO (electronically signed)  Attending Emergency Physician           Marleen Carreon DO  05/08/23 [id number]

## 2023-05-11 ENCOUNTER — OFFICE VISIT (OUTPATIENT)
Dept: ORTHOPEDIC SURGERY | Age: 53
End: 2023-05-11

## 2023-05-11 VITALS — BODY MASS INDEX: 37.93 KG/M2 | WEIGHT: 236 LBS | HEIGHT: 66 IN

## 2023-05-11 DIAGNOSIS — Z72.0 TOBACCO USE: ICD-10-CM

## 2023-05-11 DIAGNOSIS — E66.9 OBESITY, CLASS II, BMI 35-39.9: ICD-10-CM

## 2023-05-11 DIAGNOSIS — M17.12 PRIMARY OSTEOARTHRITIS OF LEFT KNEE: Primary | ICD-10-CM

## 2023-05-11 NOTE — PROGRESS NOTES
sertraline (ZOLOFT) 50 MG tablet Take 1 tablet by mouth daily 30 tablet 5     No current facility-administered medications for this visit. Allergies: No Known Allergies  FAMILY HISTORY:   Family History   Problem Relation Age of Onset    Other Mother 52        heart failure    Diabetes Mother     Heart Disease Mother     Alcohol Abuse Father     Diabetes Brother     Other Brother         drug addiction    Other Brother         drug addiction    Other Brother         drug addiction     SOCIAL HISTORY:   Social History     Socioeconomic History    Marital status: Single     Spouse name: None    Number of children: 8    Years of education: None    Highest education level: None   Occupational History    Occupation: on disability   Tobacco Use    Smoking status: Every Day     Packs/day: 0.50     Years: 28.00     Pack years: 14.00     Types: Cigarettes     Start date: 1/1/1984    Smokeless tobacco: Never    Tobacco comments:     states she is trying to work with patches but not helping   Vaping Use    Vaping Use: Never used   Substance and Sexual Activity    Alcohol use: No     Alcohol/week: 0.0 standard drinks    Drug use: No    Sexual activity: Yes     Partners: Male   Social History Narrative    One child at home    No grandchildren       PHYSICAL EXAMINATION:  Ht 5' 6\" (1.676 m)   Wt 236 lb (107 kg)   LMP 02/06/2014   BMI 38.09 kg/m²   The patient can bear weight on the extremity and walks with a(n) antalgic gait. The skin is intact, and there are no scars or atrophy. Alignment of the knee is in neutral. There is no quadriceps atrophy. There is no effusion present. There is tenderness to palpation around the entire patella. Range of motion is from 0 degrees of extension to 105 degrees of flexion. Lachman is negative, posterior drawer is negative. MCL testing is negative and LCL testing is negative. There is a negative Lydia's test. Patellar grind test is positive with painful palpable click.   Patellar

## 2023-06-23 ENCOUNTER — TELEPHONE (OUTPATIENT)
Dept: ORTHOPEDIC SURGERY | Age: 53
End: 2023-06-23

## 2023-06-23 DIAGNOSIS — M17.12 PRIMARY OSTEOARTHRITIS OF LEFT KNEE: ICD-10-CM

## 2023-06-23 DIAGNOSIS — M25.561 CHRONIC PAIN OF RIGHT KNEE: Primary | ICD-10-CM

## 2023-06-23 DIAGNOSIS — E66.9 OBESITY, CLASS II, BMI 35-39.9: ICD-10-CM

## 2023-06-23 DIAGNOSIS — G89.29 CHRONIC PAIN OF RIGHT KNEE: Primary | ICD-10-CM

## 2023-06-23 DIAGNOSIS — Z72.0 TOBACCO USE: ICD-10-CM

## 2023-06-23 NOTE — TELEPHONE ENCOUNTER
General Question     Subject: REQUESTING A REFERRAL FAXED TO: [phone]  Patient: Norene Severe  Contact Number: [phone]

## 2023-06-26 ENCOUNTER — APPOINTMENT (OUTPATIENT)
Dept: CT IMAGING | Age: 53
End: 2023-06-26
Payer: MEDICAID

## 2023-06-26 ENCOUNTER — OFFICE VISIT (OUTPATIENT)
Dept: INTERNAL MEDICINE CLINIC | Age: 53
End: 2023-06-26
Payer: MEDICAID

## 2023-06-26 ENCOUNTER — HOSPITAL ENCOUNTER (INPATIENT)
Age: 53
LOS: 3 days | Discharge: HOME | End: 2023-06-30
Attending: EMERGENCY MEDICINE | Admitting: INTERNAL MEDICINE
Payer: MEDICAID

## 2023-06-26 ENCOUNTER — APPOINTMENT (OUTPATIENT)
Dept: GENERAL RADIOLOGY | Age: 53
End: 2023-06-26
Payer: MEDICAID

## 2023-06-26 VITALS
WEIGHT: 235.25 LBS | OXYGEN SATURATION: 96 % | BODY MASS INDEX: 37.97 KG/M2 | SYSTOLIC BLOOD PRESSURE: 140 MMHG | HEART RATE: 76 BPM | DIASTOLIC BLOOD PRESSURE: 88 MMHG | TEMPERATURE: 98 F

## 2023-06-26 DIAGNOSIS — R06.00 DYSPNEA AND RESPIRATORY ABNORMALITIES: ICD-10-CM

## 2023-06-26 DIAGNOSIS — J96.11 CHRONIC RESPIRATORY FAILURE WITH HYPOXIA (HCC): ICD-10-CM

## 2023-06-26 DIAGNOSIS — R42 DIZZINESS: Primary | ICD-10-CM

## 2023-06-26 DIAGNOSIS — R55 SYNCOPE AND COLLAPSE: Primary | ICD-10-CM

## 2023-06-26 DIAGNOSIS — J44.9 CHRONIC OBSTRUCTIVE PULMONARY DISEASE, UNSPECIFIED COPD TYPE (HCC): ICD-10-CM

## 2023-06-26 DIAGNOSIS — R06.89 DYSPNEA AND RESPIRATORY ABNORMALITIES: ICD-10-CM

## 2023-06-26 DIAGNOSIS — R03.0 ELEVATED BLOOD PRESSURE READING: ICD-10-CM

## 2023-06-26 DIAGNOSIS — R73.03 PREDIABETES: ICD-10-CM

## 2023-06-26 DIAGNOSIS — E03.9 HYPOTHYROIDISM, UNSPECIFIED TYPE: ICD-10-CM

## 2023-06-26 DIAGNOSIS — Z72.0 TOBACCO ABUSE: ICD-10-CM

## 2023-06-26 DIAGNOSIS — F32.A DEPRESSION, UNSPECIFIED DEPRESSION TYPE: ICD-10-CM

## 2023-06-26 PROBLEM — R00.2 PALPITATIONS: Status: ACTIVE | Noted: 2023-06-26

## 2023-06-26 LAB
ALBUMIN SERPL-MCNC: 4 G/DL (ref 3.4–5)
ALBUMIN/GLOB SERPL: 1.4 {RATIO} (ref 1.1–2.2)
ALP SERPL-CCNC: 76 U/L (ref 40–129)
ALT SERPL-CCNC: 17 U/L (ref 10–40)
ANION GAP SERPL CALCULATED.3IONS-SCNC: 12 MMOL/L (ref 3–16)
AST SERPL-CCNC: 23 U/L (ref 15–37)
BASE EXCESS BLDV CALC-SCNC: 2.7 MMOL/L
BASOPHILS # BLD: 0.1 K/UL (ref 0–0.2)
BASOPHILS NFR BLD: 1.2 %
BILIRUB SERPL-MCNC: 0.4 MG/DL (ref 0–1)
BUN SERPL-MCNC: 12 MG/DL (ref 7–20)
CALCIUM SERPL-MCNC: 9.1 MG/DL (ref 8.3–10.6)
CHLORIDE SERPL-SCNC: 103 MMOL/L (ref 99–110)
CHP ED QC CHECK: YES
CO2 BLDV-SCNC: 30 MMOL/L
CO2 SERPL-SCNC: 26 MMOL/L (ref 21–32)
COHGB MFR BLDV: 5.6 %
CREAT SERPL-MCNC: 0.7 MG/DL (ref 0.6–1.1)
D DIMER: <0.27 UG/ML FEU (ref 0–0.6)
DEPRECATED RDW RBC AUTO: 12.9 % (ref 12.4–15.4)
EOSINOPHIL # BLD: 0.3 K/UL (ref 0–0.6)
EOSINOPHIL NFR BLD: 2.9 %
FLUAV RNA UPPER RESP QL NAA+PROBE: NEGATIVE
FLUBV AG NPH QL: NEGATIVE
GFR SERPLBLD CREATININE-BSD FMLA CKD-EPI: >60 ML/MIN/{1.73_M2}
GLUCOSE BLD-MCNC: 167 MG/DL
GLUCOSE BLD-MCNC: 167 MG/DL (ref 70–99)
GLUCOSE SERPL-MCNC: 115 MG/DL (ref 70–99)
HCO3 BLDV-SCNC: 29 MMOL/L (ref 23–29)
HCT VFR BLD AUTO: 46.8 % (ref 36–48)
HGB BLD-MCNC: 16.3 G/DL (ref 12–16)
LYMPHOCYTES # BLD: 3 K/UL (ref 1–5.1)
LYMPHOCYTES NFR BLD: 31.9 %
MCH RBC QN AUTO: 30.9 PG (ref 26–34)
MCHC RBC AUTO-ENTMCNC: 34.7 G/DL (ref 31–36)
MCV RBC AUTO: 88.8 FL (ref 80–100)
METHGB MFR BLDV: 0.6 %
MONOCYTES # BLD: 0.6 K/UL (ref 0–1.3)
MONOCYTES NFR BLD: 6 %
NEUTROPHILS # BLD: 5.4 K/UL (ref 1.7–7.7)
NEUTROPHILS NFR BLD: 58 %
NT-PROBNP SERPL-MCNC: 67 PG/ML (ref 0–124)
O2 THERAPY: NORMAL
PCO2 BLDV: 47 MMHG (ref 40–50)
PERFORMED ON: ABNORMAL
PH BLDV: 7.39 [PH] (ref 7.35–7.45)
PLATELET # BLD AUTO: 343 K/UL (ref 135–450)
PMV BLD AUTO: 8.1 FL (ref 5–10.5)
PO2 BLDV: 34 MMHG
POTASSIUM SERPL-SCNC: 3.6 MMOL/L (ref 3.5–5.1)
PROT SERPL-MCNC: 6.9 G/DL (ref 6.4–8.2)
RBC # BLD AUTO: 5.27 M/UL (ref 4–5.2)
SAO2 % BLDV: 65 %
SARS-COV-2 RDRP RESP QL NAA+PROBE: NOT DETECTED
SODIUM SERPL-SCNC: 141 MMOL/L (ref 136–145)
TROPONIN, HIGH SENSITIVITY: <6 NG/L (ref 0–14)
TROPONIN, HIGH SENSITIVITY: <6 NG/L (ref 0–14)
TSH SERPL DL<=0.005 MIU/L-ACNC: 1.86 UIU/ML (ref 0.27–4.2)
WBC # BLD AUTO: 9.4 K/UL (ref 4–11)

## 2023-06-26 PROCEDURE — 6360000002 HC RX W HCPCS: Performed by: STUDENT IN AN ORGANIZED HEALTH CARE EDUCATION/TRAINING PROGRAM

## 2023-06-26 PROCEDURE — 84484 ASSAY OF TROPONIN QUANT: CPT

## 2023-06-26 PROCEDURE — 6370000000 HC RX 637 (ALT 250 FOR IP): Performed by: EMERGENCY MEDICINE

## 2023-06-26 PROCEDURE — 87804 INFLUENZA ASSAY W/OPTIC: CPT

## 2023-06-26 PROCEDURE — 94640 AIRWAY INHALATION TREATMENT: CPT

## 2023-06-26 PROCEDURE — 84443 ASSAY THYROID STIM HORMONE: CPT

## 2023-06-26 PROCEDURE — 80053 COMPREHEN METABOLIC PANEL: CPT

## 2023-06-26 PROCEDURE — G0378 HOSPITAL OBSERVATION PER HR: HCPCS

## 2023-06-26 PROCEDURE — 85025 COMPLETE CBC W/AUTO DIFF WBC: CPT

## 2023-06-26 PROCEDURE — 93005 ELECTROCARDIOGRAM TRACING: CPT | Performed by: STUDENT IN AN ORGANIZED HEALTH CARE EDUCATION/TRAINING PROGRAM

## 2023-06-26 PROCEDURE — 85379 FIBRIN DEGRADATION QUANT: CPT

## 2023-06-26 PROCEDURE — 96374 THER/PROPH/DIAG INJ IV PUSH: CPT

## 2023-06-26 PROCEDURE — 82803 BLOOD GASES ANY COMBINATION: CPT

## 2023-06-26 PROCEDURE — 83880 ASSAY OF NATRIURETIC PEPTIDE: CPT

## 2023-06-26 PROCEDURE — 94761 N-INVAS EAR/PLS OXIMETRY MLT: CPT

## 2023-06-26 PROCEDURE — 36415 COLL VENOUS BLD VENIPUNCTURE: CPT

## 2023-06-26 PROCEDURE — 87635 SARS-COV-2 COVID-19 AMP PRB: CPT

## 2023-06-26 PROCEDURE — 71046 X-RAY EXAM CHEST 2 VIEWS: CPT

## 2023-06-26 PROCEDURE — 93005 ELECTROCARDIOGRAM TRACING: CPT | Performed by: EMERGENCY MEDICINE

## 2023-06-26 PROCEDURE — 70450 CT HEAD/BRAIN W/O DYE: CPT

## 2023-06-26 PROCEDURE — 6360000004 HC RX CONTRAST MEDICATION: Performed by: EMERGENCY MEDICINE

## 2023-06-26 PROCEDURE — 6360000002 HC RX W HCPCS: Performed by: EMERGENCY MEDICINE

## 2023-06-26 PROCEDURE — 99203 OFFICE O/P NEW LOW 30 MIN: CPT | Performed by: NURSE PRACTITIONER

## 2023-06-26 PROCEDURE — 71250 CT THORAX DX C-: CPT

## 2023-06-26 PROCEDURE — 99285 EMERGENCY DEPT VISIT HI MDM: CPT

## 2023-06-26 PROCEDURE — 6370000000 HC RX 637 (ALT 250 FOR IP): Performed by: STUDENT IN AN ORGANIZED HEALTH CARE EDUCATION/TRAINING PROGRAM

## 2023-06-26 PROCEDURE — 70498 CT ANGIOGRAPHY NECK: CPT

## 2023-06-26 PROCEDURE — 2580000003 HC RX 258: Performed by: EMERGENCY MEDICINE

## 2023-06-26 RX ORDER — ONDANSETRON 2 MG/ML
4 INJECTION INTRAMUSCULAR; INTRAVENOUS EVERY 6 HOURS PRN
Status: DISCONTINUED | OUTPATIENT
Start: 2023-06-26 | End: 2023-06-30 | Stop reason: HOSPADM

## 2023-06-26 RX ORDER — 0.9 % SODIUM CHLORIDE 0.9 %
1000 INTRAVENOUS SOLUTION INTRAVENOUS ONCE
Status: COMPLETED | OUTPATIENT
Start: 2023-06-26 | End: 2023-06-26

## 2023-06-26 RX ORDER — ACETAMINOPHEN 500 MG
500 TABLET ORAL EVERY 6 HOURS PRN
COMMUNITY

## 2023-06-26 RX ORDER — IPRATROPIUM BROMIDE AND ALBUTEROL SULFATE 2.5; .5 MG/3ML; MG/3ML
1 SOLUTION RESPIRATORY (INHALATION) EVERY 4 HOURS PRN
Status: DISCONTINUED | OUTPATIENT
Start: 2023-06-26 | End: 2023-06-30 | Stop reason: HOSPADM

## 2023-06-26 RX ORDER — PREDNISONE 20 MG/1
40 TABLET ORAL DAILY
Status: DISCONTINUED | OUTPATIENT
Start: 2023-06-27 | End: 2023-06-30 | Stop reason: HOSPADM

## 2023-06-26 RX ORDER — AZITHROMYCIN 250 MG/1
250 TABLET, FILM COATED ORAL NIGHTLY
Status: DISCONTINUED | OUTPATIENT
Start: 2023-06-26 | End: 2023-06-30 | Stop reason: HOSPADM

## 2023-06-26 RX ORDER — SODIUM CHLORIDE 9 MG/ML
INJECTION, SOLUTION INTRAVENOUS PRN
Status: DISCONTINUED | OUTPATIENT
Start: 2023-06-26 | End: 2023-06-30 | Stop reason: HOSPADM

## 2023-06-26 RX ORDER — ENOXAPARIN SODIUM 100 MG/ML
30 INJECTION SUBCUTANEOUS 2 TIMES DAILY
Status: DISCONTINUED | OUTPATIENT
Start: 2023-06-27 | End: 2023-06-30 | Stop reason: HOSPADM

## 2023-06-26 RX ORDER — SODIUM CHLORIDE 0.9 % (FLUSH) 0.9 %
5-40 SYRINGE (ML) INJECTION EVERY 12 HOURS SCHEDULED
Status: DISCONTINUED | OUTPATIENT
Start: 2023-06-26 | End: 2023-06-30 | Stop reason: HOSPADM

## 2023-06-26 RX ORDER — LORAZEPAM 2 MG/ML
0.5 INJECTION INTRAMUSCULAR PRN
Status: DISCONTINUED | OUTPATIENT
Start: 2023-06-26 | End: 2023-06-30 | Stop reason: HOSPADM

## 2023-06-26 RX ORDER — SODIUM CHLORIDE 0.9 % (FLUSH) 0.9 %
5-40 SYRINGE (ML) INJECTION PRN
Status: DISCONTINUED | OUTPATIENT
Start: 2023-06-26 | End: 2023-06-30 | Stop reason: HOSPADM

## 2023-06-26 RX ORDER — LIDOCAINE 4 G/G
1 PATCH TOPICAL DAILY PRN
COMMUNITY

## 2023-06-26 RX ORDER — IBUPROFEN 200 MG
200 TABLET ORAL EVERY 6 HOURS PRN
Status: ON HOLD | COMMUNITY
End: 2023-06-29 | Stop reason: HOSPADM

## 2023-06-26 RX ORDER — IPRATROPIUM BROMIDE AND ALBUTEROL SULFATE 2.5; .5 MG/3ML; MG/3ML
1 SOLUTION RESPIRATORY (INHALATION) ONCE
Status: COMPLETED | OUTPATIENT
Start: 2023-06-26 | End: 2023-06-26

## 2023-06-26 RX ORDER — LANOLIN ALCOHOL/MO/W.PET/CERES
9 CREAM (GRAM) TOPICAL NIGHTLY PRN
Status: DISCONTINUED | OUTPATIENT
Start: 2023-06-26 | End: 2023-06-30 | Stop reason: HOSPADM

## 2023-06-26 RX ORDER — TROLAMINE SALICYLATE 10 G/100G
1 CREAM TOPICAL PRN
COMMUNITY

## 2023-06-26 RX ORDER — IPRATROPIUM BROMIDE AND ALBUTEROL SULFATE 2.5; .5 MG/3ML; MG/3ML
1 SOLUTION RESPIRATORY (INHALATION)
Status: DISCONTINUED | OUTPATIENT
Start: 2023-06-26 | End: 2023-06-26

## 2023-06-26 RX ORDER — GUAIFENESIN/DEXTROMETHORPHAN 100-10MG/5
5 SYRUP ORAL EVERY 4 HOURS PRN
Status: DISCONTINUED | OUTPATIENT
Start: 2023-06-26 | End: 2023-06-30 | Stop reason: HOSPADM

## 2023-06-26 RX ORDER — ACETAMINOPHEN 325 MG/1
650 TABLET ORAL EVERY 6 HOURS PRN
Status: DISCONTINUED | OUTPATIENT
Start: 2023-06-26 | End: 2023-06-30 | Stop reason: HOSPADM

## 2023-06-26 RX ORDER — ACETAMINOPHEN 650 MG/1
650 SUPPOSITORY RECTAL EVERY 6 HOURS PRN
Status: DISCONTINUED | OUTPATIENT
Start: 2023-06-26 | End: 2023-06-30 | Stop reason: HOSPADM

## 2023-06-26 RX ADMIN — IPRATROPIUM BROMIDE AND ALBUTEROL SULFATE 1 DOSE: 2.5; .5 SOLUTION RESPIRATORY (INHALATION) at 20:37

## 2023-06-26 RX ADMIN — IPRATROPIUM BROMIDE AND ALBUTEROL SULFATE 1 DOSE: .5; 3 SOLUTION RESPIRATORY (INHALATION) at 17:58

## 2023-06-26 RX ADMIN — AZITHROMYCIN 250 MG: 500 TABLET, FILM COATED ORAL at 22:36

## 2023-06-26 RX ADMIN — SODIUM CHLORIDE 1000 ML: 9 INJECTION, SOLUTION INTRAVENOUS at 22:05

## 2023-06-26 RX ADMIN — IOPAMIDOL 75 ML: 755 INJECTION, SOLUTION INTRAVENOUS at 22:49

## 2023-06-26 RX ADMIN — LORAZEPAM 0.5 MG: 2 INJECTION INTRAMUSCULAR; INTRAVENOUS at 22:33

## 2023-06-26 RX ADMIN — METHYLPREDNISOLONE SODIUM SUCCINATE 125 MG: 125 INJECTION, POWDER, FOR SOLUTION INTRAMUSCULAR; INTRAVENOUS at 17:42

## 2023-06-26 SDOH — ECONOMIC STABILITY: FOOD INSECURITY: WITHIN THE PAST 12 MONTHS, THE FOOD YOU BOUGHT JUST DIDN'T LAST AND YOU DIDN'T HAVE MONEY TO GET MORE.: NEVER TRUE

## 2023-06-26 SDOH — ECONOMIC STABILITY: HOUSING INSECURITY
IN THE LAST 12 MONTHS, WAS THERE A TIME WHEN YOU DID NOT HAVE A STEADY PLACE TO SLEEP OR SLEPT IN A SHELTER (INCLUDING NOW)?: NO

## 2023-06-26 SDOH — ECONOMIC STABILITY: FOOD INSECURITY: WITHIN THE PAST 12 MONTHS, YOU WORRIED THAT YOUR FOOD WOULD RUN OUT BEFORE YOU GOT MONEY TO BUY MORE.: NEVER TRUE

## 2023-06-26 SDOH — ECONOMIC STABILITY: INCOME INSECURITY: HOW HARD IS IT FOR YOU TO PAY FOR THE VERY BASICS LIKE FOOD, HOUSING, MEDICAL CARE, AND HEATING?: NOT VERY HARD

## 2023-06-26 ASSESSMENT — PATIENT HEALTH QUESTIONNAIRE - PHQ9
SUM OF ALL RESPONSES TO PHQ9 QUESTIONS 1 & 2: 2
5. POOR APPETITE OR OVEREATING: 1
6. FEELING BAD ABOUT YOURSELF - OR THAT YOU ARE A FAILURE OR HAVE LET YOURSELF OR YOUR FAMILY DOWN: 0
7. TROUBLE CONCENTRATING ON THINGS, SUCH AS READING THE NEWSPAPER OR WATCHING TELEVISION: 1
10. IF YOU CHECKED OFF ANY PROBLEMS, HOW DIFFICULT HAVE THESE PROBLEMS MADE IT FOR YOU TO DO YOUR WORK, TAKE CARE OF THINGS AT HOME, OR GET ALONG WITH OTHER PEOPLE: 1
4. FEELING TIRED OR HAVING LITTLE ENERGY: 3
8. MOVING OR SPEAKING SO SLOWLY THAT OTHER PEOPLE COULD HAVE NOTICED. OR THE OPPOSITE, BEING SO FIGETY OR RESTLESS THAT YOU HAVE BEEN MOVING AROUND A LOT MORE THAN USUAL: 0
SUM OF ALL RESPONSES TO PHQ QUESTIONS 1-9: 10
3. TROUBLE FALLING OR STAYING ASLEEP: 3
SUM OF ALL RESPONSES TO PHQ QUESTIONS 1-9: 10
2. FEELING DOWN, DEPRESSED OR HOPELESS: 1
SUM OF ALL RESPONSES TO PHQ QUESTIONS 1-9: 10
9. THOUGHTS THAT YOU WOULD BE BETTER OFF DEAD, OR OF HURTING YOURSELF: 0
1. LITTLE INTEREST OR PLEASURE IN DOING THINGS: 1
SUM OF ALL RESPONSES TO PHQ QUESTIONS 1-9: 10

## 2023-06-26 ASSESSMENT — ENCOUNTER SYMPTOMS
COUGH: 1
SHORTNESS OF BREATH: 1

## 2023-06-26 ASSESSMENT — PAIN DESCRIPTION - ORIENTATION: ORIENTATION: MID

## 2023-06-26 ASSESSMENT — PAIN SCALES - GENERAL: PAINLEVEL_OUTOF10: 4

## 2023-06-26 ASSESSMENT — PAIN DESCRIPTION - DESCRIPTORS: DESCRIPTORS: ACHING;SHARP

## 2023-06-26 ASSESSMENT — PAIN - FUNCTIONAL ASSESSMENT: PAIN_FUNCTIONAL_ASSESSMENT: 0-10

## 2023-06-26 ASSESSMENT — LIFESTYLE VARIABLES
HOW OFTEN DO YOU HAVE A DRINK CONTAINING ALCOHOL: NEVER
HOW MANY STANDARD DRINKS CONTAINING ALCOHOL DO YOU HAVE ON A TYPICAL DAY: PATIENT DOES NOT DRINK

## 2023-06-26 ASSESSMENT — PAIN DESCRIPTION - LOCATION: LOCATION: SHOULDER

## 2023-06-27 PROBLEM — I65.22 INTERNAL CAROTID ARTERY STENOSIS, LEFT: Status: ACTIVE | Noted: 2023-06-27

## 2023-06-27 PROBLEM — J44.1 COPD EXACERBATION (HCC): Status: ACTIVE | Noted: 2023-06-27

## 2023-06-27 LAB
ALBUMIN SERPL-MCNC: 3.9 G/DL (ref 3.4–5)
ANION GAP SERPL CALCULATED.3IONS-SCNC: 13 MMOL/L (ref 3–16)
BASOPHILS # BLD: 0 K/UL (ref 0–0.2)
BASOPHILS NFR BLD: 0.5 %
BUN SERPL-MCNC: 10 MG/DL (ref 7–20)
CALCIUM SERPL-MCNC: 8.9 MG/DL (ref 8.3–10.6)
CHLORIDE SERPL-SCNC: 105 MMOL/L (ref 99–110)
CHOLEST SERPL-MCNC: 187 MG/DL (ref 0–199)
CO2 SERPL-SCNC: 21 MMOL/L (ref 21–32)
CREAT SERPL-MCNC: 0.5 MG/DL (ref 0.6–1.1)
DEPRECATED RDW RBC AUTO: 13.2 % (ref 12.4–15.4)
EKG ATRIAL RATE: 78 BPM
EKG ATRIAL RATE: 79 BPM
EKG DIAGNOSIS: NORMAL
EKG DIAGNOSIS: NORMAL
EKG P AXIS: 50 DEGREES
EKG P AXIS: 64 DEGREES
EKG P-R INTERVAL: 166 MS
EKG P-R INTERVAL: 184 MS
EKG Q-T INTERVAL: 386 MS
EKG Q-T INTERVAL: 398 MS
EKG QRS DURATION: 84 MS
EKG QRS DURATION: 96 MS
EKG QTC CALCULATION (BAZETT): 440 MS
EKG QTC CALCULATION (BAZETT): 456 MS
EKG R AXIS: -46 DEGREES
EKG R AXIS: 262 DEGREES
EKG T AXIS: 67 DEGREES
EKG T AXIS: 75 DEGREES
EKG VENTRICULAR RATE: 78 BPM
EKG VENTRICULAR RATE: 79 BPM
EOSINOPHIL # BLD: 0 K/UL (ref 0–0.6)
EOSINOPHIL NFR BLD: 0 %
EST. AVERAGE GLUCOSE BLD GHB EST-MCNC: 116.9 MG/DL
GFR SERPLBLD CREATININE-BSD FMLA CKD-EPI: >60 ML/MIN/{1.73_M2}
GLUCOSE SERPL-MCNC: 201 MG/DL (ref 70–99)
HBA1C MFR BLD: 5.7 %
HCT VFR BLD AUTO: 44.3 % (ref 36–48)
HDLC SERPL-MCNC: 35 MG/DL (ref 40–60)
HGB BLD-MCNC: 15.2 G/DL (ref 12–16)
LDLC SERPL CALC-MCNC: 134 MG/DL
LYMPHOCYTES # BLD: 1.1 K/UL (ref 1–5.1)
LYMPHOCYTES NFR BLD: 11.9 %
MAGNESIUM SERPL-MCNC: 2 MG/DL (ref 1.8–2.4)
MCH RBC QN AUTO: 30.9 PG (ref 26–34)
MCHC RBC AUTO-ENTMCNC: 34.4 G/DL (ref 31–36)
MCV RBC AUTO: 89.8 FL (ref 80–100)
MONOCYTES # BLD: 0.2 K/UL (ref 0–1.3)
MONOCYTES NFR BLD: 2.2 %
NEUTROPHILS # BLD: 8.1 K/UL (ref 1.7–7.7)
NEUTROPHILS NFR BLD: 85.4 %
PHOSPHATE SERPL-MCNC: 3.3 MG/DL (ref 2.5–4.9)
PLATELET # BLD AUTO: 314 K/UL (ref 135–450)
PMV BLD AUTO: 8.2 FL (ref 5–10.5)
POTASSIUM SERPL-SCNC: 4 MMOL/L (ref 3.5–5.1)
RBC # BLD AUTO: 4.93 M/UL (ref 4–5.2)
SODIUM SERPL-SCNC: 139 MMOL/L (ref 136–145)
TRIGL SERPL-MCNC: 91 MG/DL (ref 0–150)
TROPONIN, HIGH SENSITIVITY: <6 NG/L (ref 0–14)
TROPONIN, HIGH SENSITIVITY: <6 NG/L (ref 0–14)
VLDLC SERPL CALC-MCNC: 18 MG/DL
WBC # BLD AUTO: 9.4 K/UL (ref 4–11)

## 2023-06-27 PROCEDURE — 80069 RENAL FUNCTION PANEL: CPT

## 2023-06-27 PROCEDURE — 93010 ELECTROCARDIOGRAM REPORT: CPT | Performed by: INTERNAL MEDICINE

## 2023-06-27 PROCEDURE — 80061 LIPID PANEL: CPT

## 2023-06-27 PROCEDURE — 84484 ASSAY OF TROPONIN QUANT: CPT

## 2023-06-27 PROCEDURE — 94760 N-INVAS EAR/PLS OXIMETRY 1: CPT

## 2023-06-27 PROCEDURE — G0378 HOSPITAL OBSERVATION PER HR: HCPCS

## 2023-06-27 PROCEDURE — 83036 HEMOGLOBIN GLYCOSYLATED A1C: CPT

## 2023-06-27 PROCEDURE — 83735 ASSAY OF MAGNESIUM: CPT

## 2023-06-27 PROCEDURE — 36415 COLL VENOUS BLD VENIPUNCTURE: CPT

## 2023-06-27 PROCEDURE — 6370000000 HC RX 637 (ALT 250 FOR IP): Performed by: STUDENT IN AN ORGANIZED HEALTH CARE EDUCATION/TRAINING PROGRAM

## 2023-06-27 PROCEDURE — 94640 AIRWAY INHALATION TREATMENT: CPT

## 2023-06-27 PROCEDURE — 2580000003 HC RX 258: Performed by: STUDENT IN AN ORGANIZED HEALTH CARE EDUCATION/TRAINING PROGRAM

## 2023-06-27 PROCEDURE — 6360000002 HC RX W HCPCS: Performed by: STUDENT IN AN ORGANIZED HEALTH CARE EDUCATION/TRAINING PROGRAM

## 2023-06-27 PROCEDURE — 6370000000 HC RX 637 (ALT 250 FOR IP): Performed by: NURSE PRACTITIONER

## 2023-06-27 PROCEDURE — 85025 COMPLETE CBC W/AUTO DIFF WBC: CPT

## 2023-06-27 RX ORDER — ATORVASTATIN CALCIUM 40 MG/1
40 TABLET, FILM COATED ORAL NIGHTLY
Status: DISCONTINUED | OUTPATIENT
Start: 2023-06-27 | End: 2023-06-30 | Stop reason: HOSPADM

## 2023-06-27 RX ORDER — ASPIRIN 81 MG/1
81 TABLET, CHEWABLE ORAL DAILY
Status: DISCONTINUED | OUTPATIENT
Start: 2023-06-27 | End: 2023-06-30 | Stop reason: HOSPADM

## 2023-06-27 RX ADMIN — AZITHROMYCIN 250 MG: 500 TABLET, FILM COATED ORAL at 20:49

## 2023-06-27 RX ADMIN — GUAIFENESIN SYRUP AND DEXTROMETHORPHAN 5 ML: 100; 10 SYRUP ORAL at 08:48

## 2023-06-27 RX ADMIN — GUAIFENESIN SYRUP AND DEXTROMETHORPHAN 5 ML: 100; 10 SYRUP ORAL at 20:49

## 2023-06-27 RX ADMIN — ACETAMINOPHEN 650 MG: 325 TABLET ORAL at 08:49

## 2023-06-27 RX ADMIN — SODIUM CHLORIDE, PRESERVATIVE FREE 10 ML: 5 INJECTION INTRAVENOUS at 00:06

## 2023-06-27 RX ADMIN — ASPIRIN 81 MG 81 MG: 81 TABLET ORAL at 08:50

## 2023-06-27 RX ADMIN — Medication 9 MG: at 00:22

## 2023-06-27 RX ADMIN — SODIUM CHLORIDE, PRESERVATIVE FREE 10 ML: 5 INJECTION INTRAVENOUS at 20:51

## 2023-06-27 RX ADMIN — IPRATROPIUM BROMIDE AND ALBUTEROL SULFATE 1 DOSE: 2.5; .5 SOLUTION RESPIRATORY (INHALATION) at 21:09

## 2023-06-27 RX ADMIN — PREDNISONE 40 MG: 20 TABLET ORAL at 08:50

## 2023-06-27 RX ADMIN — ATORVASTATIN CALCIUM 40 MG: 40 TABLET, FILM COATED ORAL at 20:49

## 2023-06-27 RX ADMIN — ENOXAPARIN SODIUM 30 MG: 100 INJECTION SUBCUTANEOUS at 08:50

## 2023-06-27 RX ADMIN — ENOXAPARIN SODIUM 30 MG: 100 INJECTION SUBCUTANEOUS at 20:49

## 2023-06-27 RX ADMIN — Medication 9 MG: at 20:50

## 2023-06-27 ASSESSMENT — PAIN DESCRIPTION - PAIN TYPE
TYPE: ACUTE PAIN
TYPE: ACUTE PAIN

## 2023-06-27 ASSESSMENT — PAIN DESCRIPTION - ONSET
ONSET: ON-GOING
ONSET: GRADUAL

## 2023-06-27 ASSESSMENT — PAIN DESCRIPTION - FREQUENCY
FREQUENCY: CONTINUOUS
FREQUENCY: CONTINUOUS

## 2023-06-27 ASSESSMENT — PAIN SCALES - GENERAL
PAINLEVEL_OUTOF10: 0
PAINLEVEL_OUTOF10: 5
PAINLEVEL_OUTOF10: 3
PAINLEVEL_OUTOF10: 0
PAINLEVEL_OUTOF10: 0

## 2023-06-27 ASSESSMENT — PAIN - FUNCTIONAL ASSESSMENT: PAIN_FUNCTIONAL_ASSESSMENT: ACTIVITIES ARE NOT PREVENTED

## 2023-06-27 ASSESSMENT — PAIN DESCRIPTION - ORIENTATION
ORIENTATION: MID
ORIENTATION: MID

## 2023-06-27 ASSESSMENT — PAIN DESCRIPTION - LOCATION
LOCATION: HEAD
LOCATION: HEAD

## 2023-06-27 ASSESSMENT — PAIN DESCRIPTION - DESCRIPTORS
DESCRIPTORS: ACHING;SHARP
DESCRIPTORS: ACHING

## 2023-06-28 LAB
ALBUMIN SERPL-MCNC: 3.7 G/DL (ref 3.4–5)
ANION GAP SERPL CALCULATED.3IONS-SCNC: 10 MMOL/L (ref 3–16)
BASOPHILS # BLD: 0 K/UL (ref 0–0.2)
BASOPHILS NFR BLD: 0.2 %
BUN SERPL-MCNC: 14 MG/DL (ref 7–20)
CALCIUM SERPL-MCNC: 8.6 MG/DL (ref 8.3–10.6)
CHLORIDE SERPL-SCNC: 107 MMOL/L (ref 99–110)
CO2 SERPL-SCNC: 25 MMOL/L (ref 21–32)
CREAT SERPL-MCNC: 0.5 MG/DL (ref 0.6–1.1)
DEPRECATED RDW RBC AUTO: 13.3 % (ref 12.4–15.4)
EOSINOPHIL # BLD: 0.1 K/UL (ref 0–0.6)
EOSINOPHIL NFR BLD: 0.4 %
GFR SERPLBLD CREATININE-BSD FMLA CKD-EPI: >60 ML/MIN/{1.73_M2}
GLUCOSE SERPL-MCNC: 89 MG/DL (ref 70–99)
HCT VFR BLD AUTO: 42.1 % (ref 36–48)
HGB BLD-MCNC: 14 G/DL (ref 12–16)
LYMPHOCYTES # BLD: 4.7 K/UL (ref 1–5.1)
LYMPHOCYTES NFR BLD: 28.8 %
MAGNESIUM SERPL-MCNC: 1.9 MG/DL (ref 1.8–2.4)
MCH RBC QN AUTO: 30 PG (ref 26–34)
MCHC RBC AUTO-ENTMCNC: 33.2 G/DL (ref 31–36)
MCV RBC AUTO: 90.3 FL (ref 80–100)
MONOCYTES # BLD: 0.8 K/UL (ref 0–1.3)
MONOCYTES NFR BLD: 4.9 %
NEUTROPHILS # BLD: 10.7 K/UL (ref 1.7–7.7)
NEUTROPHILS NFR BLD: 65.7 %
PHOSPHATE SERPL-MCNC: 3.4 MG/DL (ref 2.5–4.9)
PLATELET # BLD AUTO: 304 K/UL (ref 135–450)
PMV BLD AUTO: 8.1 FL (ref 5–10.5)
POTASSIUM SERPL-SCNC: 4 MMOL/L (ref 3.5–5.1)
RBC # BLD AUTO: 4.67 M/UL (ref 4–5.2)
SODIUM SERPL-SCNC: 142 MMOL/L (ref 136–145)
WBC # BLD AUTO: 16.3 K/UL (ref 4–11)

## 2023-06-28 PROCEDURE — 6370000000 HC RX 637 (ALT 250 FOR IP): Performed by: STUDENT IN AN ORGANIZED HEALTH CARE EDUCATION/TRAINING PROGRAM

## 2023-06-28 PROCEDURE — 93880 EXTRACRANIAL BILAT STUDY: CPT

## 2023-06-28 PROCEDURE — 83735 ASSAY OF MAGNESIUM: CPT

## 2023-06-28 PROCEDURE — 36415 COLL VENOUS BLD VENIPUNCTURE: CPT

## 2023-06-28 PROCEDURE — 6370000000 HC RX 637 (ALT 250 FOR IP): Performed by: INTERNAL MEDICINE

## 2023-06-28 PROCEDURE — 80069 RENAL FUNCTION PANEL: CPT

## 2023-06-28 PROCEDURE — 2580000003 HC RX 258: Performed by: STUDENT IN AN ORGANIZED HEALTH CARE EDUCATION/TRAINING PROGRAM

## 2023-06-28 PROCEDURE — 6360000002 HC RX W HCPCS: Performed by: STUDENT IN AN ORGANIZED HEALTH CARE EDUCATION/TRAINING PROGRAM

## 2023-06-28 PROCEDURE — 85025 COMPLETE CBC W/AUTO DIFF WBC: CPT

## 2023-06-28 PROCEDURE — 6370000000 HC RX 637 (ALT 250 FOR IP): Performed by: NURSE PRACTITIONER

## 2023-06-28 RX ORDER — IBUPROFEN 400 MG/1
400 TABLET ORAL ONCE
Status: COMPLETED | OUTPATIENT
Start: 2023-06-28 | End: 2023-06-28

## 2023-06-28 RX ADMIN — AZITHROMYCIN 250 MG: 500 TABLET, FILM COATED ORAL at 20:28

## 2023-06-28 RX ADMIN — ENOXAPARIN SODIUM 30 MG: 100 INJECTION SUBCUTANEOUS at 09:06

## 2023-06-28 RX ADMIN — IBUPROFEN 400 MG: 400 TABLET, FILM COATED ORAL at 18:21

## 2023-06-28 RX ADMIN — Medication 9 MG: at 20:32

## 2023-06-28 RX ADMIN — ASPIRIN 81 MG 81 MG: 81 TABLET ORAL at 09:06

## 2023-06-28 RX ADMIN — GUAIFENESIN SYRUP AND DEXTROMETHORPHAN 5 ML: 100; 10 SYRUP ORAL at 20:28

## 2023-06-28 RX ADMIN — SODIUM CHLORIDE, PRESERVATIVE FREE 10 ML: 5 INJECTION INTRAVENOUS at 09:08

## 2023-06-28 RX ADMIN — SODIUM CHLORIDE, PRESERVATIVE FREE 10 ML: 5 INJECTION INTRAVENOUS at 20:29

## 2023-06-28 RX ADMIN — ENOXAPARIN SODIUM 30 MG: 100 INJECTION SUBCUTANEOUS at 20:28

## 2023-06-28 RX ADMIN — PREDNISONE 40 MG: 20 TABLET ORAL at 09:06

## 2023-06-28 RX ADMIN — ATORVASTATIN CALCIUM 40 MG: 40 TABLET, FILM COATED ORAL at 20:27

## 2023-06-28 ASSESSMENT — PAIN DESCRIPTION - DESCRIPTORS: DESCRIPTORS: THROBBING

## 2023-06-28 ASSESSMENT — PAIN SCALES - GENERAL
PAINLEVEL_OUTOF10: 0
PAINLEVEL_OUTOF10: 5

## 2023-06-28 ASSESSMENT — PAIN DESCRIPTION - ORIENTATION: ORIENTATION: RIGHT;LEFT

## 2023-06-28 ASSESSMENT — PAIN - FUNCTIONAL ASSESSMENT: PAIN_FUNCTIONAL_ASSESSMENT: PREVENTS OR INTERFERES SOME ACTIVE ACTIVITIES AND ADLS

## 2023-06-28 ASSESSMENT — PAIN DESCRIPTION - LOCATION: LOCATION: KNEE

## 2023-06-29 LAB
ALBUMIN SERPL-MCNC: 3.6 G/DL (ref 3.4–5)
ANION GAP SERPL CALCULATED.3IONS-SCNC: 9 MMOL/L (ref 3–16)
BASOPHILS # BLD: 0 K/UL (ref 0–0.2)
BASOPHILS NFR BLD: 0 %
BUN SERPL-MCNC: 18 MG/DL (ref 7–20)
CALCIUM SERPL-MCNC: 8.3 MG/DL (ref 8.3–10.6)
CHLORIDE SERPL-SCNC: 106 MMOL/L (ref 99–110)
CO2 SERPL-SCNC: 26 MMOL/L (ref 21–32)
CREAT SERPL-MCNC: 0.6 MG/DL (ref 0.6–1.1)
DEPRECATED RDW RBC AUTO: 13.2 % (ref 12.4–15.4)
EOSINOPHIL # BLD: 0.3 K/UL (ref 0–0.6)
EOSINOPHIL NFR BLD: 2 %
GFR SERPLBLD CREATININE-BSD FMLA CKD-EPI: >60 ML/MIN/{1.73_M2}
GLUCOSE SERPL-MCNC: 88 MG/DL (ref 70–99)
HCT VFR BLD AUTO: 42.7 % (ref 36–48)
HGB BLD-MCNC: 14.2 G/DL (ref 12–16)
LYMPHOCYTES # BLD: 4.7 K/UL (ref 1–5.1)
LYMPHOCYTES NFR BLD: 29 %
MAGNESIUM SERPL-MCNC: 1.9 MG/DL (ref 1.8–2.4)
MCH RBC QN AUTO: 30 PG (ref 26–34)
MCHC RBC AUTO-ENTMCNC: 33.3 G/DL (ref 31–36)
MCV RBC AUTO: 90.1 FL (ref 80–100)
MONOCYTES # BLD: 0.9 K/UL (ref 0–1.3)
MONOCYTES NFR BLD: 6 %
NEUTROPHILS # BLD: 9.2 K/UL (ref 1.7–7.7)
NEUTROPHILS NFR BLD: 61 %
PHOSPHATE SERPL-MCNC: 3.6 MG/DL (ref 2.5–4.9)
PLATELET # BLD AUTO: 313 K/UL (ref 135–450)
PLATELET BLD QL SMEAR: ADEQUATE
PMV BLD AUTO: 8.2 FL (ref 5–10.5)
POTASSIUM SERPL-SCNC: 3.8 MMOL/L (ref 3.5–5.1)
RBC # BLD AUTO: 4.73 M/UL (ref 4–5.2)
RBC MORPH BLD: NORMAL
SLIDE REVIEW: ABNORMAL
SODIUM SERPL-SCNC: 141 MMOL/L (ref 136–145)
VARIANT LYMPHS NFR BLD MANUAL: 2 % (ref 0–6)
WBC # BLD AUTO: 15 K/UL (ref 4–11)

## 2023-06-29 PROCEDURE — 6360000002 HC RX W HCPCS: Performed by: STUDENT IN AN ORGANIZED HEALTH CARE EDUCATION/TRAINING PROGRAM

## 2023-06-29 PROCEDURE — 2580000003 HC RX 258: Performed by: STUDENT IN AN ORGANIZED HEALTH CARE EDUCATION/TRAINING PROGRAM

## 2023-06-29 PROCEDURE — 94760 N-INVAS EAR/PLS OXIMETRY 1: CPT

## 2023-06-29 PROCEDURE — 6370000000 HC RX 637 (ALT 250 FOR IP): Performed by: STUDENT IN AN ORGANIZED HEALTH CARE EDUCATION/TRAINING PROGRAM

## 2023-06-29 PROCEDURE — 85025 COMPLETE CBC W/AUTO DIFF WBC: CPT

## 2023-06-29 PROCEDURE — 6370000000 HC RX 637 (ALT 250 FOR IP): Performed by: NURSE PRACTITIONER

## 2023-06-29 PROCEDURE — 80069 RENAL FUNCTION PANEL: CPT

## 2023-06-29 PROCEDURE — 83735 ASSAY OF MAGNESIUM: CPT

## 2023-06-29 PROCEDURE — 36415 COLL VENOUS BLD VENIPUNCTURE: CPT

## 2023-06-29 RX ORDER — ATORVASTATIN CALCIUM 40 MG/1
40 TABLET, FILM COATED ORAL NIGHTLY
Qty: 30 TABLET | Refills: 0 | Status: SHIPPED | OUTPATIENT
Start: 2023-06-29

## 2023-06-29 RX ORDER — GUAIFENESIN/DEXTROMETHORPHAN 100-10MG/5
5 SYRUP ORAL EVERY 4 HOURS PRN
Qty: 60 ML | Refills: 0 | Status: SHIPPED | OUTPATIENT
Start: 2023-06-29 | End: 2023-07-04

## 2023-06-29 RX ORDER — PREDNISONE 20 MG/1
40 TABLET ORAL DAILY
Qty: 4 TABLET | Refills: 0 | Status: SHIPPED | OUTPATIENT
Start: 2023-06-30 | End: 2023-07-02

## 2023-06-29 RX ORDER — AZITHROMYCIN 250 MG/1
250 TABLET, FILM COATED ORAL NIGHTLY
Qty: 2 TABLET | Refills: 0 | Status: SHIPPED | OUTPATIENT
Start: 2023-06-29 | End: 2023-07-01

## 2023-06-29 RX ORDER — ASPIRIN 81 MG/1
81 TABLET, CHEWABLE ORAL DAILY
Qty: 30 TABLET | Refills: 0 | Status: SHIPPED | OUTPATIENT
Start: 2023-06-30

## 2023-06-29 RX ADMIN — SODIUM CHLORIDE, PRESERVATIVE FREE 10 ML: 5 INJECTION INTRAVENOUS at 20:31

## 2023-06-29 RX ADMIN — ASPIRIN 81 MG 81 MG: 81 TABLET ORAL at 08:18

## 2023-06-29 RX ADMIN — PREDNISONE 40 MG: 20 TABLET ORAL at 08:18

## 2023-06-29 RX ADMIN — GUAIFENESIN SYRUP AND DEXTROMETHORPHAN 5 ML: 100; 10 SYRUP ORAL at 20:30

## 2023-06-29 RX ADMIN — ENOXAPARIN SODIUM 30 MG: 100 INJECTION SUBCUTANEOUS at 20:30

## 2023-06-29 RX ADMIN — ATORVASTATIN CALCIUM 40 MG: 40 TABLET, FILM COATED ORAL at 20:30

## 2023-06-29 RX ADMIN — ENOXAPARIN SODIUM 30 MG: 100 INJECTION SUBCUTANEOUS at 08:18

## 2023-06-29 RX ADMIN — SODIUM CHLORIDE, PRESERVATIVE FREE 10 ML: 5 INJECTION INTRAVENOUS at 08:19

## 2023-06-29 RX ADMIN — AZITHROMYCIN 250 MG: 500 TABLET, FILM COATED ORAL at 20:29

## 2023-06-29 RX ADMIN — Medication 9 MG: at 20:31

## 2023-06-29 ASSESSMENT — PAIN SCALES - GENERAL
PAINLEVEL_OUTOF10: 0

## 2023-06-30 VITALS
DIASTOLIC BLOOD PRESSURE: 89 MMHG | RESPIRATION RATE: 16 BRPM | HEIGHT: 66 IN | SYSTOLIC BLOOD PRESSURE: 148 MMHG | TEMPERATURE: 97.3 F | BODY MASS INDEX: 38.9 KG/M2 | WEIGHT: 242.06 LBS | HEART RATE: 70 BPM | OXYGEN SATURATION: 95 %

## 2023-06-30 LAB
LV EF: 60 %
LVEF MODALITY: NORMAL

## 2023-06-30 PROCEDURE — 94760 N-INVAS EAR/PLS OXIMETRY 1: CPT

## 2023-06-30 PROCEDURE — 6370000000 HC RX 637 (ALT 250 FOR IP): Performed by: STUDENT IN AN ORGANIZED HEALTH CARE EDUCATION/TRAINING PROGRAM

## 2023-06-30 PROCEDURE — 2580000003 HC RX 258: Performed by: STUDENT IN AN ORGANIZED HEALTH CARE EDUCATION/TRAINING PROGRAM

## 2023-06-30 PROCEDURE — 6360000004 HC RX CONTRAST MEDICATION: Performed by: STUDENT IN AN ORGANIZED HEALTH CARE EDUCATION/TRAINING PROGRAM

## 2023-06-30 PROCEDURE — C8929 TTE W OR WO FOL WCON,DOPPLER: HCPCS

## 2023-06-30 PROCEDURE — 6360000002 HC RX W HCPCS: Performed by: STUDENT IN AN ORGANIZED HEALTH CARE EDUCATION/TRAINING PROGRAM

## 2023-06-30 RX ADMIN — ASPIRIN 81 MG 81 MG: 81 TABLET ORAL at 08:49

## 2023-06-30 RX ADMIN — SODIUM CHLORIDE, PRESERVATIVE FREE 10 ML: 5 INJECTION INTRAVENOUS at 08:51

## 2023-06-30 RX ADMIN — PREDNISONE 40 MG: 20 TABLET ORAL at 08:49

## 2023-06-30 RX ADMIN — ENOXAPARIN SODIUM 30 MG: 100 INJECTION SUBCUTANEOUS at 08:49

## 2023-06-30 RX ADMIN — PERFLUTREN 1.5 ML: 6.52 INJECTION, SUSPENSION INTRAVENOUS at 11:49

## 2023-06-30 ASSESSMENT — PAIN SCALES - GENERAL
PAINLEVEL_OUTOF10: 0

## 2023-07-03 ENCOUNTER — TELEPHONE (OUTPATIENT)
Dept: INTERNAL MEDICINE CLINIC | Age: 53
End: 2023-07-03

## 2023-07-03 NOTE — TELEPHONE ENCOUNTER
Care Transitions Initial Follow Up Call    Outreach made within 2 business days of discharge: Yes    Patient: Bear Barnett Patient : 1970   MRN: [id number]  Reason for Admission: There are no discharge diagnoses documented for the most recent discharge. Discharge Date: 23       Spoke with: patient     Discharge department/facility: [facility] Interactive Patient Contact:  Was patient able to fill all prescriptions: Yes  Was patient instructed to bring all medications to the follow-up visit: Yes  Is patient taking all medications as directed in the discharge summary? Yes  Does patient understand their discharge instructions: Yes  Does patient have questions or concerns that need addressed prior to 7-14 day follow up office visit: no    Scheduled appointment with PCP within 7-14 days    Follow Up  No future appointments.     Yuan Rai MA

## 2023-07-10 ENCOUNTER — OFFICE VISIT (OUTPATIENT)
Dept: INTERNAL MEDICINE CLINIC | Age: 53
End: 2023-07-10
Payer: MEDICAID

## 2023-07-10 VITALS
HEART RATE: 75 BPM | SYSTOLIC BLOOD PRESSURE: 112 MMHG | BODY MASS INDEX: 37.85 KG/M2 | OXYGEN SATURATION: 96 % | WEIGHT: 234.5 LBS | DIASTOLIC BLOOD PRESSURE: 82 MMHG | TEMPERATURE: 98 F

## 2023-07-10 DIAGNOSIS — Z72.0 TOBACCO ABUSE: ICD-10-CM

## 2023-07-10 DIAGNOSIS — R73.03 PREDIABETES: ICD-10-CM

## 2023-07-10 DIAGNOSIS — F41.9 ANXIETY: ICD-10-CM

## 2023-07-10 DIAGNOSIS — Z09 HOSPITAL DISCHARGE FOLLOW-UP: Primary | ICD-10-CM

## 2023-07-10 DIAGNOSIS — Z12.11 COLON CANCER SCREENING: ICD-10-CM

## 2023-07-10 DIAGNOSIS — Z12.31 ENCOUNTER FOR SCREENING MAMMOGRAM FOR MALIGNANT NEOPLASM OF BREAST: ICD-10-CM

## 2023-07-10 DIAGNOSIS — I65.23 BILATERAL CAROTID ARTERY STENOSIS: ICD-10-CM

## 2023-07-10 DIAGNOSIS — F32.A DEPRESSION, UNSPECIFIED DEPRESSION TYPE: ICD-10-CM

## 2023-07-10 DIAGNOSIS — R42 DIZZINESS: ICD-10-CM

## 2023-07-10 DIAGNOSIS — E78.00 PURE HYPERCHOLESTEROLEMIA: ICD-10-CM

## 2023-07-10 DIAGNOSIS — J44.9 CHRONIC OBSTRUCTIVE PULMONARY DISEASE, UNSPECIFIED COPD TYPE (HCC): ICD-10-CM

## 2023-07-10 PROCEDURE — 1111F DSCHRG MED/CURRENT MED MERGE: CPT | Performed by: NURSE PRACTITIONER

## 2023-07-10 PROCEDURE — 99214 OFFICE O/P EST MOD 30 MIN: CPT | Performed by: NURSE PRACTITIONER

## 2023-07-10 RX ORDER — FLUTICASONE FUROATE, UMECLIDINIUM BROMIDE AND VILANTEROL TRIFENATATE 100; 62.5; 25 UG/1; UG/1; UG/1
1 POWDER RESPIRATORY (INHALATION) DAILY
Qty: 3 EACH | Refills: 1 | Status: SHIPPED | OUTPATIENT
Start: 2023-07-10

## 2023-07-10 RX ORDER — ATORVASTATIN CALCIUM 40 MG/1
40 TABLET, FILM COATED ORAL NIGHTLY
Qty: 90 TABLET | Refills: 1 | Status: SHIPPED | OUTPATIENT
Start: 2023-07-10

## 2023-07-10 RX ORDER — ALBUTEROL SULFATE 90 UG/1
2 AEROSOL, METERED RESPIRATORY (INHALATION) 4 TIMES DAILY PRN
Qty: 54 G | Refills: 1 | Status: SHIPPED | OUTPATIENT
Start: 2023-07-10

## 2023-07-10 RX ORDER — ASPIRIN 81 MG/1
81 TABLET, CHEWABLE ORAL DAILY
Qty: 90 TABLET | Refills: 1 | Status: SHIPPED | OUTPATIENT
Start: 2023-07-10

## 2023-07-10 RX ORDER — HYDROXYZINE HYDROCHLORIDE 25 MG/1
25 TABLET, FILM COATED ORAL EVERY 8 HOURS PRN
Qty: 30 TABLET | Refills: 0 | Status: SHIPPED | OUTPATIENT
Start: 2023-07-10 | End: 2023-07-20

## 2023-07-10 ASSESSMENT — ENCOUNTER SYMPTOMS
COUGH: 0
SHORTNESS OF BREATH: 1
CONSTIPATION: 0

## 2023-07-10 NOTE — PATIENT INSTRUCTIONS
Start sertraline and hydroxyzine as needed, follow up 6 weeks  Mammogram  Stool testing for colon cancer screening  See lung doctor  See vascular surgeon

## 2023-07-10 NOTE — PROGRESS NOTES
Post-Discharge Transitional Care Follow Up    Bear Barnett   YOB: 1970    Date of Office Visit:  7/10/2023  Date of Hospital Admission: 6/26/2023  Date of Hospital Discharge: 6/30/2023    Care management risk score Rising risk (score 2-5) and Complex Care (Scores >=6): No Risk Score On File     Non face to face  following discharge, date last encounter closed (first attempt may have been earlier): 07/03/2023     Call initiated 2 business days of discharge: Yes    ASSESSMENT/PLAN:   Hospital discharge follow-up  -     NE DISCHARGE MEDS RECONCILED W/ CURRENT OUTPATIENT MED LIST  Chronic obstructive pulmonary disease, unspecified COPD type ([address]): symptomatically improved from exacerbation. Smoking cessation strongly advised. Commence on PRN albuterol; commence on Trelegy - sample provided. Refer to pulmonology. -     Tom Gaines MD, Pulmonary, [facility]  -     fluticasone-umeclidin-vilant (Sidney Blade) 337-58.1-77 MCG/ACT AEPB inhaler; Inhale 1 puff into the lungs daily, Disp-3 each, R-1Normal  -     albuterol sulfate HFA (VENTOLIN HFA) 108 (90 Base) MCG/ACT inhaler; Inhale 2 puffs into the lungs 4 times daily as needed for Wheezing or Shortness of Breath, Disp-54 g, R-1Normal  Dizziness: resolved  Prediabetes: stable, diet measures advised  Pure hypercholesterolemia: tolerating statin  Encounter for screening mammogram for malignant neoplasm of breast  -     HANK DIGITAL SCREEN W OR WO CAD BILATERAL; Future  Colon cancer screening: declines colonoscopy   -     Fecal DNA Colorectal cancer screening (Cologuard)  Bilateral carotid artery stenosis  -     [facility] Kat nelson DO, Vascular/Endovascular Surgeons, [facility]  -     aspirin 81 MG chewable tablet; Take 1 tablet by mouth daily, Disp-90 tablet, R-1Normal  -     atorvastatin (LIPITOR) 40 MG tablet;  Take 1 tablet by mouth nightly, Disp-90 tablet, R-1Normal  Tobacco abuse: cessation strongly advised   Depression,

## 2023-08-11 ENCOUNTER — TELEPHONE (OUTPATIENT)
Dept: PRIMARY CARE CLINIC | Age: 53
End: 2023-08-11

## 2023-08-18 PROBLEM — F41.9 ANXIETY: Status: ACTIVE | Noted: 2023-08-18

## 2023-09-17 ENCOUNTER — APPOINTMENT (OUTPATIENT)
Dept: CT IMAGING | Age: 53
End: 2023-09-17
Payer: MEDICAID

## 2023-09-17 ENCOUNTER — HOSPITAL ENCOUNTER (EMERGENCY)
Age: 53
Discharge: ANOTHER HOSPITAL | End: 2023-09-18
Attending: STUDENT IN AN ORGANIZED HEALTH CARE EDUCATION/TRAINING PROGRAM
Payer: MEDICAID

## 2023-09-17 ENCOUNTER — APPOINTMENT (OUTPATIENT)
Dept: GENERAL RADIOLOGY | Age: 53
End: 2023-09-17
Payer: MEDICAID

## 2023-09-17 DIAGNOSIS — I65.22 LEFT CAROTID STENOSIS: ICD-10-CM

## 2023-09-17 DIAGNOSIS — G45.9 TIA (TRANSIENT ISCHEMIC ATTACK): ICD-10-CM

## 2023-09-17 DIAGNOSIS — R42 DIZZINESS: Primary | ICD-10-CM

## 2023-09-17 LAB
ALBUMIN SERPL-MCNC: 3.8 G/DL (ref 3.4–5)
ALBUMIN/GLOB SERPL: 1.6 {RATIO} (ref 1.1–2.2)
ALP SERPL-CCNC: 57 U/L (ref 40–129)
ALT SERPL-CCNC: 12 U/L (ref 10–40)
ANION GAP SERPL CALCULATED.3IONS-SCNC: 11 MMOL/L (ref 3–16)
AST SERPL-CCNC: 14 U/L (ref 15–37)
BASOPHILS # BLD: 0.1 K/UL (ref 0–0.2)
BASOPHILS NFR BLD: 1.1 %
BILIRUB SERPL-MCNC: <0.2 MG/DL (ref 0–1)
BUN SERPL-MCNC: 10 MG/DL (ref 7–20)
CALCIUM SERPL-MCNC: 8.8 MG/DL (ref 8.3–10.6)
CHLORIDE SERPL-SCNC: 102 MMOL/L (ref 99–110)
CO2 SERPL-SCNC: 27 MMOL/L (ref 21–32)
CREAT SERPL-MCNC: <0.5 MG/DL (ref 0.6–1.1)
DEPRECATED RDW RBC AUTO: 13.3 % (ref 12.4–15.4)
EOSINOPHIL # BLD: 0.3 K/UL (ref 0–0.6)
EOSINOPHIL NFR BLD: 2.3 %
GFR SERPLBLD CREATININE-BSD FMLA CKD-EPI: >60 ML/MIN/{1.73_M2}
GLUCOSE SERPL-MCNC: 105 MG/DL (ref 70–99)
HCT VFR BLD AUTO: 46.1 % (ref 36–48)
HGB BLD-MCNC: 15.6 G/DL (ref 12–16)
LYMPHOCYTES # BLD: 4 K/UL (ref 1–5.1)
LYMPHOCYTES NFR BLD: 33.3 %
MAGNESIUM SERPL-MCNC: 2 MG/DL (ref 1.8–2.4)
MCH RBC QN AUTO: 30.3 PG (ref 26–34)
MCHC RBC AUTO-ENTMCNC: 33.9 G/DL (ref 31–36)
MCV RBC AUTO: 89.4 FL (ref 80–100)
MONOCYTES # BLD: 0.8 K/UL (ref 0–1.3)
MONOCYTES NFR BLD: 6.7 %
NEUTROPHILS # BLD: 6.8 K/UL (ref 1.7–7.7)
NEUTROPHILS NFR BLD: 56.6 %
NT-PROBNP SERPL-MCNC: 136 PG/ML (ref 0–124)
PLATELET # BLD AUTO: 326 K/UL (ref 135–450)
PMV BLD AUTO: 7.9 FL (ref 5–10.5)
POTASSIUM SERPL-SCNC: 3.6 MMOL/L (ref 3.5–5.1)
PROT SERPL-MCNC: 6.2 G/DL (ref 6.4–8.2)
RBC # BLD AUTO: 5.16 M/UL (ref 4–5.2)
SODIUM SERPL-SCNC: 140 MMOL/L (ref 136–145)
TROPONIN, HIGH SENSITIVITY: <6 NG/L (ref 0–14)
TROPONIN, HIGH SENSITIVITY: <6 NG/L (ref 0–14)
WBC # BLD AUTO: 12 K/UL (ref 4–11)

## 2023-09-17 PROCEDURE — 6360000004 HC RX CONTRAST MEDICATION: Performed by: STUDENT IN AN ORGANIZED HEALTH CARE EDUCATION/TRAINING PROGRAM

## 2023-09-17 PROCEDURE — 99285 EMERGENCY DEPT VISIT HI MDM: CPT | Performed by: STUDENT IN AN ORGANIZED HEALTH CARE EDUCATION/TRAINING PROGRAM

## 2023-09-17 PROCEDURE — 70498 CT ANGIOGRAPHY NECK: CPT

## 2023-09-17 PROCEDURE — 80053 COMPREHEN METABOLIC PANEL: CPT

## 2023-09-17 PROCEDURE — 84484 ASSAY OF TROPONIN QUANT: CPT

## 2023-09-17 PROCEDURE — 83735 ASSAY OF MAGNESIUM: CPT

## 2023-09-17 PROCEDURE — 36415 COLL VENOUS BLD VENIPUNCTURE: CPT

## 2023-09-17 PROCEDURE — 83880 ASSAY OF NATRIURETIC PEPTIDE: CPT

## 2023-09-17 PROCEDURE — 70450 CT HEAD/BRAIN W/O DYE: CPT

## 2023-09-17 PROCEDURE — 71045 X-RAY EXAM CHEST 1 VIEW: CPT

## 2023-09-17 PROCEDURE — 85025 COMPLETE CBC W/AUTO DIFF WBC: CPT

## 2023-09-17 PROCEDURE — 93005 ELECTROCARDIOGRAM TRACING: CPT | Performed by: STUDENT IN AN ORGANIZED HEALTH CARE EDUCATION/TRAINING PROGRAM

## 2023-09-17 RX ADMIN — IOMEPROL INJECTION 75 ML: 714 INJECTION, SOLUTION INTRAVASCULAR at 23:31

## 2023-09-17 ASSESSMENT — VISUAL ACUITY
OS: 20/70
OU: 20/30
OD: 20/40

## 2023-09-17 ASSESSMENT — LIFESTYLE VARIABLES
HOW MANY STANDARD DRINKS CONTAINING ALCOHOL DO YOU HAVE ON A TYPICAL DAY: PATIENT DOES NOT DRINK
HOW OFTEN DO YOU HAVE A DRINK CONTAINING ALCOHOL: NEVER

## 2023-09-18 ENCOUNTER — APPOINTMENT (OUTPATIENT)
Dept: MRI IMAGING | Age: 53
DRG: 024 | End: 2023-09-18
Attending: INTERNAL MEDICINE
Payer: MEDICAID

## 2023-09-18 ENCOUNTER — HOSPITAL ENCOUNTER (INPATIENT)
Age: 53
LOS: 5 days | Discharge: HOME | DRG: 024 | End: 2023-09-23
Attending: INTERNAL MEDICINE | Admitting: INTERNAL MEDICINE
Payer: MEDICAID

## 2023-09-18 VITALS
HEART RATE: 62 BPM | BODY MASS INDEX: 38.05 KG/M2 | HEIGHT: 66 IN | SYSTOLIC BLOOD PRESSURE: 103 MMHG | WEIGHT: 236.77 LBS | RESPIRATION RATE: 16 BRPM | DIASTOLIC BLOOD PRESSURE: 54 MMHG | TEMPERATURE: 97.9 F | OXYGEN SATURATION: 95 %

## 2023-09-18 DIAGNOSIS — I65.22 LEFT CAROTID STENOSIS: ICD-10-CM

## 2023-09-18 DIAGNOSIS — I65.23 BILATERAL CAROTID ARTERY STENOSIS: ICD-10-CM

## 2023-09-18 DIAGNOSIS — Z98.890 S/P CAROTID ENDARTERECTOMY: Primary | ICD-10-CM

## 2023-09-18 PROBLEM — G45.9 TIA (TRANSIENT ISCHEMIC ATTACK): Status: ACTIVE | Noted: 2023-09-18

## 2023-09-18 LAB
ANTI-XA UNFRAC HEPARIN: 0.16 IU/ML (ref 0.3–0.7)
ANTI-XA UNFRAC HEPARIN: 0.17 IU/ML (ref 0.3–0.7)
APTT BLD: 30.6 SEC (ref 22.7–35.9)
CHOLEST SERPL-MCNC: 172 MG/DL (ref 0–199)
EKG ATRIAL RATE: 70 BPM
EKG DIAGNOSIS: NORMAL
EKG P AXIS: 63 DEGREES
EKG P-R INTERVAL: 170 MS
EKG Q-T INTERVAL: 412 MS
EKG QRS DURATION: 94 MS
EKG QTC CALCULATION (BAZETT): 444 MS
EKG R AXIS: -38 DEGREES
EKG T AXIS: 71 DEGREES
EKG VENTRICULAR RATE: 70 BPM
HDLC SERPL-MCNC: 27 MG/DL (ref 40–60)
INR PPP: 1.12 (ref 0.84–1.16)
LDLC SERPL CALC-MCNC: 96 MG/DL
PROTHROMBIN TIME: 14.4 SEC (ref 11.5–14.8)
TRIGL SERPL-MCNC: 244 MG/DL (ref 0–150)
VLDLC SERPL CALC-MCNC: 49 MG/DL

## 2023-09-18 PROCEDURE — 2060000000 HC ICU INTERMEDIATE R&B

## 2023-09-18 PROCEDURE — 6370000000 HC RX 637 (ALT 250 FOR IP): Performed by: NURSE PRACTITIONER

## 2023-09-18 PROCEDURE — 70551 MRI BRAIN STEM W/O DYE: CPT

## 2023-09-18 PROCEDURE — 92526 ORAL FUNCTION THERAPY: CPT

## 2023-09-18 PROCEDURE — 80061 LIPID PANEL: CPT

## 2023-09-18 PROCEDURE — 70549 MR ANGIOGRAPH NECK W/O&W/DYE: CPT

## 2023-09-18 PROCEDURE — 6370000000 HC RX 637 (ALT 250 FOR IP): Performed by: INTERNAL MEDICINE

## 2023-09-18 PROCEDURE — 99223 1ST HOSP IP/OBS HIGH 75: CPT | Performed by: PSYCHIATRY & NEUROLOGY

## 2023-09-18 PROCEDURE — 6360000004 HC RX CONTRAST MEDICATION: Performed by: INTERNAL MEDICINE

## 2023-09-18 PROCEDURE — 85610 PROTHROMBIN TIME: CPT

## 2023-09-18 PROCEDURE — 2580000003 HC RX 258: Performed by: INTERNAL MEDICINE

## 2023-09-18 PROCEDURE — 6370000000 HC RX 637 (ALT 250 FOR IP): Performed by: HOSPITALIST

## 2023-09-18 PROCEDURE — 6360000002 HC RX W HCPCS: Performed by: INTERNAL MEDICINE

## 2023-09-18 PROCEDURE — 36415 COLL VENOUS BLD VENIPUNCTURE: CPT

## 2023-09-18 PROCEDURE — 93010 ELECTROCARDIOGRAM REPORT: CPT | Performed by: INTERNAL MEDICINE

## 2023-09-18 PROCEDURE — 85730 THROMBOPLASTIN TIME PARTIAL: CPT

## 2023-09-18 PROCEDURE — 92610 EVALUATE SWALLOWING FUNCTION: CPT

## 2023-09-18 PROCEDURE — 6360000002 HC RX W HCPCS: Performed by: HOSPITALIST

## 2023-09-18 PROCEDURE — 6360000002 HC RX W HCPCS: Performed by: NURSE PRACTITIONER

## 2023-09-18 PROCEDURE — 94761 N-INVAS EAR/PLS OXIMETRY MLT: CPT

## 2023-09-18 PROCEDURE — A9579 GAD-BASE MR CONTRAST NOS,1ML: HCPCS | Performed by: INTERNAL MEDICINE

## 2023-09-18 PROCEDURE — 70544 MR ANGIOGRAPHY HEAD W/O DYE: CPT

## 2023-09-18 PROCEDURE — APPNB180 APP NON BILLABLE TIME > 60 MINS

## 2023-09-18 PROCEDURE — 85520 HEPARIN ASSAY: CPT

## 2023-09-18 PROCEDURE — 83036 HEMOGLOBIN GLYCOSYLATED A1C: CPT

## 2023-09-18 PROCEDURE — 94640 AIRWAY INHALATION TREATMENT: CPT

## 2023-09-18 RX ORDER — ASPIRIN 81 MG/1
81 TABLET, CHEWABLE ORAL DAILY
Status: DISCONTINUED | OUTPATIENT
Start: 2023-09-18 | End: 2023-09-23 | Stop reason: HOSPADM

## 2023-09-18 RX ORDER — ACETAMINOPHEN 650 MG/1
650 SUPPOSITORY RECTAL EVERY 6 HOURS PRN
Status: DISCONTINUED | OUTPATIENT
Start: 2023-09-18 | End: 2023-09-23 | Stop reason: HOSPADM

## 2023-09-18 RX ORDER — SODIUM CHLORIDE 0.9 % (FLUSH) 0.9 %
5-40 SYRINGE (ML) INJECTION EVERY 12 HOURS SCHEDULED
Status: DISCONTINUED | OUTPATIENT
Start: 2023-09-18 | End: 2023-09-23 | Stop reason: HOSPADM

## 2023-09-18 RX ORDER — SODIUM CHLORIDE 0.9 % (FLUSH) 0.9 %
5-40 SYRINGE (ML) INJECTION PRN
Status: DISCONTINUED | OUTPATIENT
Start: 2023-09-18 | End: 2023-09-23 | Stop reason: HOSPADM

## 2023-09-18 RX ORDER — ONDANSETRON 4 MG/1
4 TABLET, ORALLY DISINTEGRATING ORAL EVERY 8 HOURS PRN
Status: DISCONTINUED | OUTPATIENT
Start: 2023-09-18 | End: 2023-09-23 | Stop reason: HOSPADM

## 2023-09-18 RX ORDER — MECOBALAMIN 5000 MCG
5 TABLET,DISINTEGRATING ORAL NIGHTLY PRN
Status: DISCONTINUED | OUTPATIENT
Start: 2023-09-18 | End: 2023-09-23 | Stop reason: HOSPADM

## 2023-09-18 RX ORDER — ACETAMINOPHEN 325 MG/1
650 TABLET ORAL EVERY 6 HOURS PRN
Status: DISCONTINUED | OUTPATIENT
Start: 2023-09-18 | End: 2023-09-23 | Stop reason: HOSPADM

## 2023-09-18 RX ORDER — SODIUM CHLORIDE 9 MG/ML
INJECTION, SOLUTION INTRAVENOUS PRN
Status: DISCONTINUED | OUTPATIENT
Start: 2023-09-18 | End: 2023-09-23 | Stop reason: HOSPADM

## 2023-09-18 RX ORDER — ARFORMOTEROL TARTRATE 15 UG/2ML
15 SOLUTION RESPIRATORY (INHALATION)
Status: DISCONTINUED | OUTPATIENT
Start: 2023-09-18 | End: 2023-09-23 | Stop reason: HOSPADM

## 2023-09-18 RX ORDER — POLYETHYLENE GLYCOL 3350 17 G/17G
17 POWDER, FOR SOLUTION ORAL DAILY PRN
Status: DISCONTINUED | OUTPATIENT
Start: 2023-09-18 | End: 2023-09-23 | Stop reason: HOSPADM

## 2023-09-18 RX ORDER — ATORVASTATIN CALCIUM 80 MG/1
80 TABLET, FILM COATED ORAL NIGHTLY
Status: DISCONTINUED | OUTPATIENT
Start: 2023-09-18 | End: 2023-09-23 | Stop reason: HOSPADM

## 2023-09-18 RX ORDER — LORAZEPAM 2 MG/ML
2 INJECTION INTRAMUSCULAR ONCE
Status: COMPLETED | OUTPATIENT
Start: 2023-09-18 | End: 2023-09-18

## 2023-09-18 RX ORDER — ARFORMOTEROL TARTRATE 15 UG/2ML
15 SOLUTION RESPIRATORY (INHALATION)
Status: DISCONTINUED | OUTPATIENT
Start: 2023-09-18 | End: 2023-09-18

## 2023-09-18 RX ORDER — ENOXAPARIN SODIUM 100 MG/ML
30 INJECTION SUBCUTANEOUS 2 TIMES DAILY
Status: DISCONTINUED | OUTPATIENT
Start: 2023-09-18 | End: 2023-09-18

## 2023-09-18 RX ORDER — ATORVASTATIN CALCIUM 40 MG/1
40 TABLET, FILM COATED ORAL NIGHTLY
Status: DISCONTINUED | OUTPATIENT
Start: 2023-09-18 | End: 2023-09-18

## 2023-09-18 RX ORDER — ONDANSETRON 2 MG/ML
4 INJECTION INTRAMUSCULAR; INTRAVENOUS EVERY 6 HOURS PRN
Status: DISCONTINUED | OUTPATIENT
Start: 2023-09-18 | End: 2023-09-23 | Stop reason: HOSPADM

## 2023-09-18 RX ORDER — HEPARIN SODIUM 10000 [USP'U]/100ML
5-30 INJECTION, SOLUTION INTRAVENOUS CONTINUOUS
Status: DISCONTINUED | OUTPATIENT
Start: 2023-09-18 | End: 2023-09-21

## 2023-09-18 RX ORDER — BUDESONIDE 0.25 MG/2ML
0.25 INHALANT ORAL
Status: DISCONTINUED | OUTPATIENT
Start: 2023-09-18 | End: 2023-09-23 | Stop reason: HOSPADM

## 2023-09-18 RX ORDER — BUDESONIDE 0.25 MG/2ML
0.25 INHALANT ORAL
Status: DISCONTINUED | OUTPATIENT
Start: 2023-09-18 | End: 2023-09-18

## 2023-09-18 RX ADMIN — LORAZEPAM 2 MG: 2 INJECTION INTRAMUSCULAR; INTRAVENOUS at 05:07

## 2023-09-18 RX ADMIN — GADOTERIDOL 20 ML: 279.3 INJECTION, SOLUTION INTRAVENOUS at 07:05

## 2023-09-18 RX ADMIN — HEPARIN SODIUM 9 UNITS/KG/HR: 10000 INJECTION, SOLUTION INTRAVENOUS at 13:27

## 2023-09-18 RX ADMIN — ATORVASTATIN CALCIUM 80 MG: 80 TABLET, FILM COATED ORAL at 20:59

## 2023-09-18 RX ADMIN — Medication 5 MG: at 23:34

## 2023-09-18 RX ADMIN — BUDESONIDE 250 MCG: 0.25 INHALANT RESPIRATORY (INHALATION) at 21:21

## 2023-09-18 RX ADMIN — ENOXAPARIN SODIUM 30 MG: 100 INJECTION SUBCUTANEOUS at 10:16

## 2023-09-18 RX ADMIN — SERTRALINE HYDROCHLORIDE 50 MG: 50 TABLET ORAL at 10:16

## 2023-09-18 RX ADMIN — ASPIRIN 81 MG: 81 TABLET, CHEWABLE ORAL at 10:16

## 2023-09-18 RX ADMIN — ARFORMOTEROL TARTRATE 15 MCG: 15 SOLUTION RESPIRATORY (INHALATION) at 21:21

## 2023-09-18 RX ADMIN — SODIUM CHLORIDE, PRESERVATIVE FREE 10 ML: 5 INJECTION INTRAVENOUS at 10:16

## 2023-09-18 NOTE — CONSULTS
NEUROSURGERY CONSULT NOTE    Joanne Bruce  [id number]   1970 9/18/2023    Requesting physician: Lissette Britt MD    Reason for consultation: LICA Stenosis    History of present illness: Patient is a 48 y.o. female that  has a past medical history of Anxiety, Asthma, COPD (chronic obstructive pulmonary disease) ([address]), Depression, Endometriosis, and Knee pain. Patient presented on 9/17/2023 to [facility] ED c/o intermittent episodes of vision loss left side, as well as dizziness. Patient complains of left blurred vision, that progressively turns to black and white speckling and then leads to total vision loss of the left eye. Patient also c/o dizziness, like the room is spinning, followed by associated shortness of breath. Patient states that the vision loss and her symptoms only last for a few seconds to a few minutes before spontaneously resolving on their own. Patient was evaluated  back in June for these exact symptoms. At that time, CTA imaging was significant for critical stenosis of the proximal ICA with string sign extending to the proximal portion of intracavernous ICA. Critical stenosis versus occlusion of left posterior communicating artery was noted at that time, as well as focal stenosis of the proximal right ICA. Carotid Dopplers were obtained too, which showed severe left ICA stenosis visually with proximal velocity in the range of 50 to 79%, however was suggestive of critical stenosis. She was discharged with instructions to follow-up with Vascular surgery, and was prescribed 81 mg ASA and Lipitor. Patient states that she has not followed up with a Vascular surgeon since being evaluated in June. She does report taking aspirin but has not been taking her Lipitor as prescribed. ROS:   GENERAL:  Denies fever or recent illness.  Denies weight changes   EYES:  Endorses vision loss  EARS:  Denies hearing loss  CARDIAC:  Denies chest pain  RESPIRATORY:  Endorses shortness of RLS (restless legs syndrome) 05/24/2018    Other hyperlipidemia 05/24/2018    Sensorineural hearing loss, bilateral 02/04/2016    Asthma 11/21/2014    Tobacco abuse 11/21/2014       Assessment:  Patient is a 48 y.o. female w/ongoing dizziness, intermittent left visual loss and shortness of breath that has been present since June 2023. CTA head and neck showed critical stenosis of the proximal left cervical ICA and 50% stenosis of the proximal right cervical ICA. Plan:  No emergent neurosurgical intervention indicated  Neurologic exams frequency: Q4H  For change in exam MUST contact neurosurgery team along with critical care or primary team  LICA Stenosis vs Occlusion:  - Imaging shows an occluded left internal carotid artery just distal to the origin with a left frontal cortex punctate acute infarction  - Start Heparin gtt (Neuro protocol)   - Diagnostic Cerebral Angiogram Thursday and based on findings Dr. Yasmine Campo may recommend an intervention  PT/OT consulted, appreciate recs  Advance diet / activity per primary team  Thank you for consult. Will follow inpatient. Please call with any questions or decline in neurological status    DISPO: Remain inpatient from neurosurgery standpoint. Dispo timing to be determined by primary team once patient is medically stable for discharge. Patient was seen and examined with Dr. Yasmine Campo who agrees with above assessment and plan.      Electronically signed by: DEANNA Palmer CNP, APRN-CNP, 9/18/2023 2:12 PM  [phone]

## 2023-09-18 NOTE — PROGRESS NOTES
4 Eyes Skin Assessment     NAME:  Tabby Agrawal  YOB: 1970  MEDICAL RECORD NUMBER:  [id number]    The patient is being assessed for  Admission    I agree that at least one RN has performed a thorough Head to Toe Skin Assessment on the patient. ALL assessment sites listed below have been assessed. Areas assessed by both nurses:    Head, Face, Ears, Shoulders, Back, Chest, Arms, Elbows, Hands, Sacrum. Buttock, Coccyx, Ischium, Legs. Feet and Heels, and Under Medical Devices         Does the Patient have a Wound?  No noted wound(s)       Urban Prevention initiated by RN: No  Wound Care Orders initiated by RN: No    Pressure Injury (Stage 3,4, Unstageable, DTI, NWPT, and Complex wounds) if present, place Wound referral order by RN under : No    New Ostomies, if present place, Ostomy referral order under : No     Nurse 1 eSignature: Electronically signed by Kush Eagle RN on 9/18/23 at 4:36 AM EDT    **SHARE this note so that the co-signing nurse can place an eSignature**    Nurse 2 eSignature: Electronically signed by Maria Elena Velasquez RN on 9/18/23 at 6:12 AM EDT

## 2023-09-18 NOTE — PROGRESS NOTES
Speech pathology  Contact note    Order received, chart reviewed, d/w RN. Neurosurgery consult is pending, therefore will hold until cleared for PO by them. Will monitor chart notes or RN will notify SLP when able to complete eval.      Terrance Rodriguez, M.S./[facility]-SLP #[id number]  Pg.  # [phone]

## 2023-09-18 NOTE — NURSE NAVIGATOR
Patient's chart reviewed for Stroke Core Measures and additional needs:    [x]   VTE prophylaxis   [x]   Antithrombotic (if applicable)   [x]   Swallow screen prior to PO intake - confirmed completed with Kimberly - bedside RN    [x]   Lipids / A1C ordered or resulted   [x]   Therapy ordered   [x]   Care plan and Education template    Patients personal risk factors specific to stroke/TIA include: Smoking, Overweight, Family h/o Heart Disease     Navigator to continue to follow patient while admitted, to assist with follow up and discharge planning as needed.      Nurse eSignature: Electronically signed by Anna Arreola RN on 9/18/23 at 11:21 AM EDT - Neuroscience Navigator

## 2023-09-18 NOTE — H&P
V2.0  History and Physical      Name:  Tabby Agrawal /Age/Sex: 1970  (48 y.o. female)   MRN & CSN:  [id number] & [id number] Encounter Date/Time: 2023 9:25 AM EDT   Location:  [facility]- PCP: DEANNA Arriola       Hospital Day: 1    Assessment and Plan:   Tabby Agrawal is a 48 y.o. female with a pmh of asthma , COPD, tobacco abuse, depression, prediabetes , restless leg syndrome, hyperlipidemia, hypothyroidism, , anxiety and recent diagnosis of left ICA severe stenosis presented with worsening intermittent episodes of dizziness and loss of vision.   Imaging showed acute stroke and severe stenosis in left ICA and possibly PICA    Recurrent episodes of dizziness and loss of vision  Left frontal cortex punctuate acute infarction  Steno-occlusive disease of the left ICA  Possible right subclavian artery stenosis  Possible severe steno-occlusive disease of the left posterior cerebral artery  - Continue aspirin  - Repeat lipid panel and increase atorvastatin to 80 mg  - Allow for permissive hypertension  - PT/OT/ST  - Continue counseling for smoking cessation  - Further recommendations per neurology/neurovascular teams    Asthma/COPD, stable  Tobacco abuse  Depression/anxiety  Prediabetes  RLS  Hyperlipidemia  Hypothyroidism  - Continue other home meds    DVT prophylaxis Lovenox    CODE STATUS full    Disposition:   Current Living situation: home  Expected Disposition: home  Estimated D/C: 2-3 days    Diet Diet NPO   DVT Prophylaxis [] Lovenox, []  Heparin, [] SCDs, [] Ambulation,  [] Eliquis, [] Xarelto, [] Coumadin   Code Status Full Code   Surrogate Decision Maker/ POA MALLORY     Personally reviewed Lab Studies and Imaging       History from:     patient    History of Present Illness:     Chief Complaint: dizziness and loss of vision  Tabby Agrawal is a 48 y.o. female with pmh of asthma , COPD, tobacco abuse, depression, prediabetes , restless leg syndrome, hyperlipidemia, hypothyroidism, and

## 2023-09-18 NOTE — ED PROVIDER NOTES
eval of heparin drip          Shared Decision-Making was performed and disposition discussed with the        Patient/Family and questions answered          Social determinants of health impacting treatment or disposition:  none      Summary of Patient Presentation:      ED Course as of 09/18/23 0212   [facility] Sep 17, 2023   2234 EKG 12 Lead  Normal sinus rhythm ventricular rate 70, parable 170, QRS 74, QTc 444, no clinically significant ST segment elevation or depression, physiological LAD, no de Bee no Wellens, no T wave abnormality [AL]   Mon Sep 18, 2023   0001 CT HEAD WO CONTRAST  \"   IMPRESSION:  No acute intracranial abnormality. \" [AL]   [address]  \"IMPRESSION:  No significant interval change from the previous exam obtained on 06/26/2023. Recommend surgical consultation. \" [AL]   3500 Adt20 order for [facility] transfer placed. [AL]   7115 To the neurology NP at [facility] [employer] will be contacting the neuro interventionalists to determine whether or not plus or minus heparin as well as placement for this patient regarding neurological checks. [AL]   0133 No heparin drip per neuro and neuro endovascular  [AL]      ED Course User Index  [AL] Cj Markham MD       Is this patient to be included in the SEP-1 core measure? No Exclusion criteria - the patient is NOT to be included for SEP-1 Core Measure due to: 2+ SIRS criteria are not met     Medical Decision Making  Amount and/or Complexity of Data Reviewed  Labs: ordered. Radiology: ordered. Decision-making details documented in ED Course. ECG/medicine tests: ordered. Decision-making details documented in ED Course. Risk  Prescription drug management. This is an ill-appearing 51-year-old female coming in for increasing frequency of episodes of vertigo as well as left eye vision loss. They are intermittent at times.   She was recently mated on June for similar symptoms however never followed up with a specialist per

## 2023-09-18 NOTE — ED NOTES
Pt resting comfortably in stretcher. Pt updated on plan of care.  Awaiting transfer to [facility] [address] BARRY Curiel  09/18/23 2851

## 2023-09-18 NOTE — CONSULTS
Neurology / Neurocritical Care Consult Note      Corean Cockayne, MD is requesting this consult. Reason for Consult: Intermittent vision loss in left ICA  Admission Chief Complaint: Dizziness    History of Present Illness     Lisbeth Mcpherson is a 48 y.o. y/o female with history significant for anxiety, depression, COPD, asthma, and tobacco abuse who presents to [facility] as a transfer from [facility] with complaints of intermittent left visual loss and dizziness. Patient has reports of ongoing dizziness, intermittent left visual loss and shortness of breath that has been present since June 2023. States over the past 2 weeks she has had increased frequency in experiencing her vertiginous symptoms with left eye visual loss, thus causing her to got to [facility] ED. While at [facility] ED, CT head was obtained, which was without acute abnormality. CTA head and neck showed critical stenosis of the proximal left cervical ICA and 50% stenosis of the proximal right cervical ICA. Also noted critical stenosis versus short segment occlusion of the P1 segment of the left PCA with reconstitution of normal flow within the P2 segment. She was then transferred to [facility] for further care and evaluation by neurovascular surgery. Per my interview with the patient, she reports that she can have anywhere from 5-10 episodes per day, and sometimes more than 10 episodes per day. States her symptoms always follow typical pattern which is associated with left blurred vision, that progressively turns to black and white speckling and then leads to total vision loss of the left eye. States she then experiences dizziness, like the room is spinning, followed by associated shortness of breath. She does not have any numbness or tingling, headache, or unilateral weakness during these events. She states that the vision loss and her symptoms only last for a few seconds to a few minutes before spontaneously resolving on their own.   She

## 2023-09-18 NOTE — PROGRESS NOTES
Speech Language Pathology  Facility/Department:[facility]  Dysphagia  Evaluation/treat//speech screen    Name: Sara Saravia  : 1970  MRN: [id number]                                                     Patient Diagnosis(es):   Patient Active Problem List    Diagnosis Date Noted    TIA (transient ischemic attack) 2023    Carotid artery stenosis, symptomatic, left 2023    Anxiety 2023    Pure hypercholesterolemia 07/10/2023    Internal carotid artery stenosis, left 2023    COPD exacerbation ([address]) 2023    COPD (chronic obstructive pulmonary disease) ([address]) 2023    Pre-syncope 2023    Palpitations 2023    Arthritis of right knee 2018    Chronic pain of right knee 2018    Hypothyroidism 2018    Prediabetes 2018    Depression 2018    RLS (restless legs syndrome) 2018    Other hyperlipidemia 2018    Sensorineural hearing loss, bilateral 2016    Asthma 2014    Tobacco abuse 2014     Past Medical History:   Diagnosis Date    Anxiety     Asthma     COPD (chronic obstructive pulmonary disease) ([address])     Depression     Endometriosis     Knee pain      Past Surgical History:   Procedure Laterality Date     SECTION      ENDOMETRIAL ABLATION      HERNIA REPAIR      HYSTERECTOMY (CERVIX STATUS UNKNOWN)      INCONTINENCE SURGERY  2017    KNEE ARTHROSCOPY Right     2012    TONSILLECTOMY       Reason for Referral:  Sara Saravia  was referred for a Speech Therapy evaluation to assess swallow function and/or communication. History of Present Illness  Per MD notes:  \"  Sara Saravia is a 48 y.o. female with a pmh of asthma , COPD, tobacco abuse, depression, prediabetes , restless leg syndrome, hyperlipidemia, hypothyroidism, , anxiety and recent diagnosis of left ICA severe stenosis presented with worsening intermittent episodes of dizziness and loss of vision.   Imaging showed acute stroke and

## 2023-09-18 NOTE — NURSE NAVIGATOR
NAME:  Kaylynn Roldan OF BIRTH:  1970  MEDICAL RECORD NUMBER:  [id number]  TODAYS DATE:  9/18/2023    Discussed personal risk factors for Stroke/TIA with patient/family, and ways to reduce the risk for a recurrent stroke. Patient's personal risk factors which were identified are:     [] Alcohol Abuse: check with your physician before any alcohol consumption. [] Atrial fibrillation: may cause blood clots. [] Drug Abuse: Seek help, talk with your doctor  [] Clotting Disorder  [] Diabetes  [x] Family history of stroke or heart disease  [] High Blood Pressure/Hypertension: work with your physician.  [] High cholesterol: monitor cholesterol levels with your physician. [x] Overweight/Obesity: work with your physician for your ideal body weight. [x] Physical Inactivity: get regular exercise as directed by your physician. [] Personal history of previous TIA or stroke  [x] Poor Diet; decrease salt (sodium) in your diet, follow diet directed by physician. [x] Smoking: Cigarette/Cigar: stop smoking. Cessation encouraged     Advised pt. that you can reduce your risk for stroke/TIA by modifying/controlling the risk factors that you have. Pt.advised to take the medications as prescribed, which will be detailed in the discharge instructions, and to not stop taking them without consulting their physician. In addition, pt. advised to maintain a healthy diet, exercise regularly and to not smoke. [facility]'s Stroke treatment and prevention, Managing your recovery  notebook  provided and/or reviewed  with patient/family. The notebook includes, but not limited to, sections addressing warning signs & symptoms of a stroke, which are: sudden numbness or weakness especially on one side of the body, sudden confusion, difficulty speaking or understanding, sudden changes in vision, sudden dizziness or loss of balance/ coordination, sudden severe headache, syncope and seizure.   The need to call EMS (911) immediately if signs & symptoms occur is emphasized . The notebook also provides education on Stroke community resources and stroke advocacy. The need for follow-up after discharge was highlighted with patient/family with them being able to repeat understanding of the importance of this.     Electronically signed by Neda Santillan RN on 9/18/2023 at 1:33 PM - Neuroscience Navigator

## 2023-09-19 LAB
ANION GAP SERPL CALCULATED.3IONS-SCNC: 12 MMOL/L (ref 3–16)
ANTI-XA UNFRAC HEPARIN: 0.1 IU/ML (ref 0.3–0.7)
ANTI-XA UNFRAC HEPARIN: 0.13 IU/ML (ref 0.3–0.7)
ANTI-XA UNFRAC HEPARIN: 0.18 IU/ML (ref 0.3–0.7)
ANTI-XA UNFRAC HEPARIN: 0.29 IU/ML (ref 0.3–0.7)
BASOPHILS # BLD: 0.1 K/UL (ref 0–0.2)
BASOPHILS NFR BLD: 1 %
BUN SERPL-MCNC: 10 MG/DL (ref 7–20)
CALCIUM SERPL-MCNC: 9 MG/DL (ref 8.3–10.6)
CHLORIDE SERPL-SCNC: 106 MMOL/L (ref 99–110)
CO2 SERPL-SCNC: 23 MMOL/L (ref 21–32)
CREAT SERPL-MCNC: <0.5 MG/DL (ref 0.6–1.1)
DEPRECATED RDW RBC AUTO: 13.5 % (ref 12.4–15.4)
EOSINOPHIL # BLD: 0.2 K/UL (ref 0–0.6)
EOSINOPHIL NFR BLD: 1.8 %
EST. AVERAGE GLUCOSE BLD GHB EST-MCNC: 128.4 MG/DL
GFR SERPLBLD CREATININE-BSD FMLA CKD-EPI: >60 ML/MIN/{1.73_M2}
GLUCOSE SERPL-MCNC: 115 MG/DL (ref 70–99)
HBA1C MFR BLD: 6.1 %
HCT VFR BLD AUTO: 41.3 % (ref 36–48)
HGB BLD-MCNC: 14.2 G/DL (ref 12–16)
LYMPHOCYTES # BLD: 2.8 K/UL (ref 1–5.1)
LYMPHOCYTES NFR BLD: 28.1 %
MCH RBC QN AUTO: 30.4 PG (ref 26–34)
MCHC RBC AUTO-ENTMCNC: 34.5 G/DL (ref 31–36)
MCV RBC AUTO: 88.2 FL (ref 80–100)
MONOCYTES # BLD: 0.8 K/UL (ref 0–1.3)
MONOCYTES NFR BLD: 8.3 %
NEUTROPHILS # BLD: 6.1 K/UL (ref 1.7–7.7)
NEUTROPHILS NFR BLD: 60.8 %
PLATELET # BLD AUTO: 266 K/UL (ref 135–450)
PMV BLD AUTO: 8.5 FL (ref 5–10.5)
POTASSIUM SERPL-SCNC: 3.7 MMOL/L (ref 3.5–5.1)
RBC # BLD AUTO: 4.68 M/UL (ref 4–5.2)
SODIUM SERPL-SCNC: 141 MMOL/L (ref 136–145)
WBC # BLD AUTO: 10 K/UL (ref 4–11)

## 2023-09-19 PROCEDURE — 80048 BASIC METABOLIC PNL TOTAL CA: CPT

## 2023-09-19 PROCEDURE — 6370000000 HC RX 637 (ALT 250 FOR IP): Performed by: NURSE PRACTITIONER

## 2023-09-19 PROCEDURE — 97162 PT EVAL MOD COMPLEX 30 MIN: CPT

## 2023-09-19 PROCEDURE — 94640 AIRWAY INHALATION TREATMENT: CPT

## 2023-09-19 PROCEDURE — C8929 TTE W OR WO FOL WCON,DOPPLER: HCPCS

## 2023-09-19 PROCEDURE — 36415 COLL VENOUS BLD VENIPUNCTURE: CPT

## 2023-09-19 PROCEDURE — 92526 ORAL FUNCTION THERAPY: CPT

## 2023-09-19 PROCEDURE — 97166 OT EVAL MOD COMPLEX 45 MIN: CPT

## 2023-09-19 PROCEDURE — 6360000002 HC RX W HCPCS: Performed by: HOSPITALIST

## 2023-09-19 PROCEDURE — 6370000000 HC RX 637 (ALT 250 FOR IP): Performed by: INTERNAL MEDICINE

## 2023-09-19 PROCEDURE — 97530 THERAPEUTIC ACTIVITIES: CPT

## 2023-09-19 PROCEDURE — 97116 GAIT TRAINING THERAPY: CPT

## 2023-09-19 PROCEDURE — 2060000000 HC ICU INTERMEDIATE R&B

## 2023-09-19 PROCEDURE — 85025 COMPLETE CBC W/AUTO DIFF WBC: CPT

## 2023-09-19 PROCEDURE — 94761 N-INVAS EAR/PLS OXIMETRY MLT: CPT

## 2023-09-19 PROCEDURE — 85520 HEPARIN ASSAY: CPT

## 2023-09-19 PROCEDURE — 99231 SBSQ HOSP IP/OBS SF/LOW 25: CPT | Performed by: NURSE PRACTITIONER

## 2023-09-19 PROCEDURE — 6370000000 HC RX 637 (ALT 250 FOR IP): Performed by: HOSPITALIST

## 2023-09-19 PROCEDURE — 6360000004 HC RX CONTRAST MEDICATION: Performed by: NURSE PRACTITIONER

## 2023-09-19 PROCEDURE — 97535 SELF CARE MNGMENT TRAINING: CPT

## 2023-09-19 PROCEDURE — 6360000002 HC RX W HCPCS: Performed by: NURSE PRACTITIONER

## 2023-09-19 RX ADMIN — HEPARIN SODIUM 13 UNITS/KG/HR: 10000 INJECTION, SOLUTION INTRAVENOUS at 02:55

## 2023-09-19 RX ADMIN — ATORVASTATIN CALCIUM 80 MG: 80 TABLET, FILM COATED ORAL at 20:33

## 2023-09-19 RX ADMIN — BUDESONIDE 250 MCG: 0.25 INHALANT RESPIRATORY (INHALATION) at 21:31

## 2023-09-19 RX ADMIN — BUDESONIDE 250 MCG: 0.25 INHALANT RESPIRATORY (INHALATION) at 09:04

## 2023-09-19 RX ADMIN — SERTRALINE HYDROCHLORIDE 50 MG: 50 TABLET ORAL at 09:28

## 2023-09-19 RX ADMIN — PERFLUTREN 1.5 ML: 6.52 INJECTION, SUSPENSION INTRAVENOUS at 11:06

## 2023-09-19 RX ADMIN — ARFORMOTEROL TARTRATE 15 MCG: 15 SOLUTION RESPIRATORY (INHALATION) at 21:30

## 2023-09-19 RX ADMIN — Medication 5 MG: at 23:11

## 2023-09-19 RX ADMIN — ARFORMOTEROL TARTRATE 15 MCG: 15 SOLUTION RESPIRATORY (INHALATION) at 09:04

## 2023-09-19 RX ADMIN — ASPIRIN 81 MG: 81 TABLET, CHEWABLE ORAL at 09:28

## 2023-09-19 RX ADMIN — HEPARIN SODIUM 13 UNITS/KG/HR: 10000 INJECTION, SOLUTION INTRAVENOUS at 09:32

## 2023-09-19 RX ADMIN — TIOTROPIUM BROMIDE INHALATION SPRAY 2 PUFF: 3.12 SPRAY, METERED RESPIRATORY (INHALATION) at 09:04

## 2023-09-19 ASSESSMENT — ENCOUNTER SYMPTOMS
SINUS PRESSURE: 0
ABDOMINAL PAIN: 0
BACK PAIN: 0
SHORTNESS OF BREATH: 0
NAUSEA: 0
COUGH: 0
WHEEZING: 0
COLOR CHANGE: 0
SINUS PAIN: 0
VOMITING: 0
SORE THROAT: 0
CONSTIPATION: 0
DIARRHEA: 0

## 2023-09-19 ASSESSMENT — PAIN SCALES - GENERAL
PAINLEVEL_OUTOF10: 0

## 2023-09-19 NOTE — CARE COORDINATION
Case Management Note        Patient unavailable at this time for assessment         Date/ Time of Note: 9/19/2023 10:27 AM         Note completed by: Ananda Olson RN    Patient Name: Lizzeth Rivers  YOB: 1970    Diagnosis:TIA (transient ischemic attack) [G45.9]    Patient Admission Status: Inpatient  Date of Admission:9/18/2023  3:40 AM  Length of Stay: 1 GLOS:   Readmission Risk Score: Readmission Risk Score: 11.9    COVID Result:    Lab Results   Component Value Date/Time    COVID19 Not Detected 06/26/2023 10:11 PM       CM NOTE:    CM attempted to meet with patient for completion of initial face to face assessment at this time. Patient currently off the unit for ECHO. CM has reviewed chart and noted patient is from home. Worked with therapy and would need a RW at [address] [place] [address] and [facility] for continued therapy at home. CM will meet with patient to further discuss when available. CM will follow up when patient available and will complete initial assessment and assist with needs for discharge.     Ananda Olson RN  [facility] [employer]  Case Management Department  Ph: [phone]  Fax: [phone]

## 2023-09-19 NOTE — PROGRESS NOTES
Occupational Therapy  Facility/Department: [place] [address] [facility]  Occupational Therapy Initial Assessment/Treatment    Name: Cathy Mckinley  : 1970  MRN: [id number]  Date of Service: 2023    Discharge Recommendations:   Cathy Mckinley scored a 20/24 on the AM-PAC ADL Inpatient form. Current research shows that an AM-PAC score of 18 or greater is typically associated with a discharge to the patient's home setting. Based on the patient's AM-PAC score, and their current ADL deficits, it is recommended that the patient have 2-3 sessions per week of Occupational Therapy at d/c to increase the patient's independence. At this time, this patient demonstrates the endurance and safety to discharge  home with home services and a follow up treatment frequency of 2-3x/wk. Please see assessment section for further patient specific details. HOME HEALTH CARE: LEVEL 1 STANDARD    - Initial home health evaluation to occur within 24-48 hours, in patient home   - Therapy to evaluate with goal of regaining prior level of functioning   - Therapy to evaluate if patient has [address] needs for personal care     If patient discharges prior to next session this note will serve as a discharge summary. Please see below for the latest assessment towards goals. OT Equipment Recommendations  Equipment Needed: No       Patient Diagnosis(es): There were no encounter diagnoses. Past Medical History:  has a past medical history of Anxiety, Asthma, COPD (chronic obstructive pulmonary disease) ([address]), Depression, Endometriosis, and Knee pain. Past Surgical History:  has a past surgical history that includes Tonsillectomy;  section; hernia repair; Endometrial ablation; Hysterectomy; Incontinence surgery (2017); and Knee arthroscopy (Right). Treatment Diagnosis: Impaired ADL and functional mobility      Assessment   Performance deficits / Impairments: Decreased functional mobility ; Decreased ADL status; Decreased endurance;Decreased balance  Assessment: Pt is from home with family and independent with mobility and self-care. Pt presents with a decline in functional independence. Pt c/o dizziness. Pt is currently requiring CG for mobility and ADL. Pt is at risk for falls. Will follow for OT services. Treatment Diagnosis: Impaired ADL and functional mobility  Prognosis: Good  Decision Making: Medium Complexity  REQUIRES OT FOLLOW-UP: Yes  Activity Tolerance  Activity Tolerance: Patient limited by fatigue  Activity Tolerance Comments: c/o dizziness        Plan   Occupational Therapy Plan  Times Per Week: 2-5  Current Treatment Recommendations: Strengthening, ROM, Balance training, Functional mobility training, Endurance training, Self-Care / ADL     Restrictions  Position Activity Restriction  Other position/activity restrictions: Up as tolerated    Subjective   General  Chart Reviewed: Yes  Additional Pertinent Hx: Pt admitted 9/18/23 for intermittent episodes of vision loss left side, as well as dizziness. This been ongoing issue for last 3 months. Worsened acutely over the last week with increasing frequent episodes. MRI brain= Left frontal cortex punctate acute infarction  2. Steno-occlusive disease of the intracranial left internal carotid artery   better characterized on CTA  3. Progression of mild white matter disease is most likely chronic small vessel ischemia with other etiologies, such as migraine headache syndrome, inflammatory  or demyelinating disease considered less likely. 4.Nonspecific partially empty sella could be sequela of benign intracranial   hypertension  Family / Caregiver Present: No  Referring Practitioner: Dr. Brenton Callas  Diagnosis: TIA  Subjective  Subjective: Pt in bed upon entry. Pt c/o dizziness.    0/10     Social/Functional History  Social/Functional History  Lives With:  (2 sons and grandson (3 yo))  Type of Home: Apartment (3rd floor)  Home Layout: One level  Home Access: Stairs to

## 2023-09-19 NOTE — PROGRESS NOTES
RN called lab regarding pt's morning labs and Anti-Xa, stated someone will be sent over to draw them.

## 2023-09-19 NOTE — PROGRESS NOTES
V2.0  [facility] Hospitalist Progress Note      Name:  Maya Haque /Age/Sex: 1970  (48 y.o. female)   MRN & CSN:  [id number] & [id number] Encounter Date/Time: 2023 12:39 PM EDT    Location:  [id number] PCP: Nick Ridley Day: 2    Assessment and Plan:   Maya Haque is a 48 y.o. female with pmh of , COPD, tobacco abuse, depression, prediabetes , restless leg syndrome, hyperlipidemia, hypothyroidism, , anxiety and recent diagnosis of left ICA severe stenosis  who presents with TIA (transient ischemic attack)      Plan:       Recurrent episodes of dizziness and loss of vision  Left frontal cortex punctuate acute infarction  Steno-occlusive disease of the left ICA  Possible right subclavian artery stenosis  Possible severe steno-occlusive disease of the left posterior cerebral artery  - Continue aspirin  - Repeat lipid panel and increase atorvastatin to 80 mg  - PT/OT/ST  - Continue counseling for smoking cessation  - Further recommendations per neurology/neurovascular teams  -Heparin drip  -Follow-up repeat CTA  -Likely will need angiography later this week with neuro interventional.     Asthma/COPD, stable  Tobacco abuse  Depression/anxiety  Prediabetes  RLS  Hyperlipidemia  Hypothyroidism  - Continue other home meds       Diet ADULT DIET;  Regular; Low Fat/Low Chol/High Fiber/CHELSEA   DVT Prophylaxis [] Lovenox, [x]  Heparin, [] SCDs, [] Ambulation,    [] Eliquis, [] Xarelto  [] Coumadin [] other   Code Status Full Code   Disposition From: home  Expected Disposition: TBD, likely home  Estimated Date of Discharge: 3-5 days  Patient requires continued admission due to neuro clearance and likely angiography   Surrogate Decision Maker/ POA      Personally reviewed Lab Studies and Imaging     Discussed management of the case with neurosurgery who recommended repeating imaging    EKG interpreted personally and results no acute ST changes    Imaging that was interpreted personally obesity, & tonsillectomy. No hx of any cancer. FINDINGS: BRAIN/VENTRICLES: There is no acute intracranial hemorrhage, mass effect or midline shift. No abnormal extra-axial fluid collection. The gray-white differentiation is maintained without evidence of an acute infarct. There is prominence of the ventricles and sulci due to global parenchymal volume loss. There are nonspecific areas of hypoattenuation within the periventricular and subcortical white matter, which likely represent chronic microvascular ischemic change. ORBITS: The visualized portion of the orbits demonstrate no acute abnormality. SINUSES: The visualized paranasal sinuses and mastoid air cells demonstrate no acute abnormality. SOFT TISSUES/SKULL: No acute abnormality of the visualized skull or soft tissues. No acute intracranial abnormality. XR CHEST PORTABLE    Result Date: 9/17/2023  EXAMINATION: ONE XRAY VIEW OF THE CHEST 9/17/2023 11:20 pm COMPARISON: 06/26/2023 HISTORY: ORDERING SYSTEM PROVIDED HISTORY: sob TECHNOLOGIST PROVIDED HISTORY: Reason for exam:->sob Reason for Exam: Shortness of Breath Additional signs and symptoms: Dizziness - dizzy, pain shooting across shoulder blades, L eye \"blacking out\" though this has happened before. Has been going on for months; dizziness; left eye \"going black\"; pain between shoulder blades; broke her phone months ago and could not follow up with anyone because she did not have the phone numbers. Relevant Medical/Surgical History: Current every day smoker. Hx of anxiety, asthma, COPD, obesity, & tonsillectomy. No hx of any cancer. FINDINGS: The lungs are without acute focal process. There is no effusion or pneumothorax. The cardiomediastinal silhouette is stable. The osseous structures are stable. No acute process.        Comment: Please note this report has been produced using speech recognition software and may contain errors related to that system including errors in grammar, punctuation,

## 2023-09-19 NOTE — PROGRESS NOTES
initially, home PT and use of RW for safety. Will follow for acute PT. Treatment Diagnosis: mobility impairement due to dizziness  Decision Making: Medium Complexity  Requires PT Follow-Up: Yes  Activity Tolerance  Activity Tolerance: Patient tolerated evaluation without incident;Patient limited by fatigue;Treatment limited secondary to medical complications (limited by dizziness)     Plan   Physcial Therapy Plan  General Plan:  (2-5)  Current Treatment Recommendations: Functional mobility training, Transfer training, Gait training, Endurance training  Safety Devices  Type of Devices: Left in chair, Call light within reach, Chair alarm in place, Nurse notified     Restrictions  Position Activity Restriction  Other position/activity restrictions: Up as tolerated     Subjective   Pain: pain: denies pain  General  Chart Reviewed: Yes  Additional Pertinent Hx: 48 y.o. female that  has a past medical history of Anxiety, Asthma, COPD (chronic obstructive pulmonary disease) ([address]), Depression, Endometriosis, and Knee pain presented to ED 9/17/23 with dizziness and transient visual changes. Found to have a small left frontal ischemic stroke on MRI. Symptoms have mostly resolved. CTA head-neck and MRA neck notable for critical, near complete stenosis of the left ICA origin, vs total occlusion of the left ICA origin; NS consult--no surgical intervention. Family / Caregiver Present: No  Diagnosis: CVA  Follows Commands: Within Functional Limits  Subjective  Subjective: Pt found supine in bed and agreeable to PT.          Social/Functional History  Social/Functional History  Lives With:  (2 sons and grandson (3 yo))  Type of Home: Apartment (3rd floor)  Home Layout: One level  Home Access: Stairs to enter with rails  Entrance Stairs - Number of Steps: 2 LUISA, 21 steps to apt level  Bathroom Shower/Tub: Tub/Shower unit  Bathroom Toilet: Standard (sink next to toilet)  Bathroom Equipment: None  Home Equipment: None  Has the return home       Education  Patient Education  Education Given To: Patient  Education Provided: Role of Therapy (importance of OOB mobility)  Education Method: Verbal;Demonstration  Barriers to Learning: None  Education Outcome: Verbalized understanding;Continued education needed      Therapy Time   Individual Concurrent Group Co-treatment   Time In 0810         Time Out 0856         Minutes 46          Timed Code Treatment Minutes:36       Total Treatment Minutes:   [address],

## 2023-09-19 NOTE — PROGRESS NOTES
Speech Language Pathology  Facility/Department:[facility]  Dysphagia  Evaluation/treat//speech screen  Discharge     Name: Lisbeth Mcpherson  : 1970  MRN: [id number]                                                     Patient Diagnosis(es):   Patient Active Problem List    Diagnosis Date Noted    TIA (transient ischemic attack) 2023    Carotid artery stenosis, symptomatic, left 2023    Anxiety 2023    Pure hypercholesterolemia 07/10/2023    Internal carotid artery stenosis, left 2023    COPD exacerbation ([address]) 2023    COPD (chronic obstructive pulmonary disease) ([address]) 2023    Pre-syncope 2023    Palpitations 2023    Arthritis of right knee 2018    Chronic pain of right knee 2018    Hypothyroidism 2018    Prediabetes 2018    Depression 2018    RLS (restless legs syndrome) 2018    Other hyperlipidemia 2018    Sensorineural hearing loss, bilateral 2016    Asthma 2014    Tobacco abuse 2014     Past Medical History:   Diagnosis Date    Anxiety     Asthma     COPD (chronic obstructive pulmonary disease) ([address])     Depression     Endometriosis     Knee pain      Past Surgical History:   Procedure Laterality Date     SECTION      ENDOMETRIAL ABLATION      HERNIA REPAIR      HYSTERECTOMY (CERVIX STATUS UNKNOWN)      INCONTINENCE SURGERY  2017    KNEE ARTHROSCOPY Right     2012    TONSILLECTOMY       Reason for Referral:  Lisbeth Mcpherson  was referred for a Speech Therapy evaluation to assess swallow function and/or communication. History of Present Illness  Per MD notes:  \"  Lisbeth Mcpherson is a 48 y.o. female with a pmh of asthma , COPD, tobacco abuse, depression, prediabetes , restless leg syndrome, hyperlipidemia, hypothyroidism, , anxiety and recent diagnosis of left ICA severe stenosis presented with worsening intermittent episodes of dizziness and loss of vision.   Imaging showed acute appears intact. Will complete full eval as appropriate        Treatment:  Dysphagia Goals: The pt will be seen  1-2 x to address the following goals:  1-The patient will tolerate recommended diet without observed clinical signs of aspiration  9/19- goal met-RN reported no concerns re: swallowing. Pt alert and oriented x3. Pt noted to be somewhat SOB. Pt was analyzed with trials of water by cup and sunchips. Pt with no difficulty with mastication. No anterior spillage or oral residue noted. Swallow movement noted with all trials. No coughing, throat clearing or change in vocal quality noted with any consistency, even when consumed successive swallows of water by cup. Pt noted to  become increasingly SOB with po trials. Pt endorsed this happens when eating at times but denied sensation of aspiration in the past.      2- The patient Shiraz Sandoval will verbalize/demonstrate understanding of dysphagia recommendations  9/18: Educated pt to purpose of visit, s/s of aspiration, concern if aspiration occurs and  rationale for diet recommendation/strategies to reduce risk for aspiration. Pt instructed to notify staff if any signs of aspiration emerge or dysphagia concerns. Pt stated comprehension   Cont goal  9/19-goal met-  The pt was educated to purpose of the visit, anatomy and physiology of the swallow, s/s of aspiration, role breathing plays in swallowing, swallowing strategies, diet recommendations, plan to discharge from Speech Therapy and was encouraged to alert staff if difficulty with swallowing emerges. The pt stated comprehension and agreement. Speech Goals:  1- pt will participate in full eval of sp/lang/cog as appropriate  9/19- goal met-Pt was alert and oriented x3. Speech is clear with no instances of word finding difficulty. Pt able to follow commands and respond to questions appropriately. Pt was able to recall 3 words with a 5 minute delay.  Pt was able to provide multiple solutions to routine problems

## 2023-09-19 NOTE — PROGRESS NOTES
Sources of Calories/IVF:n/a     COMPARATIVE STANDARDS  Energy (kcal):  7094-9332 (15-18)     Protein (g):  118-130 (2-2.2)       Fluid (ml/day):  1ml/kcal    ANTHROPOMETRICS  Current Height: 5' 6\" (167.6 cm)  Current Weight - Scale: 229 lb 0.9 oz (103.9 kg)    Admission weight: 233 lb (105.7 kg)    The patient will be monitored per nutrition standards of care. Consult dietitian if additional nutrition interventions are needed prior to RD reassessment.      Chey Renee  Larry:  [phone]  Office:  [phone]

## 2023-09-19 NOTE — PROGRESS NOTES
NEUROSURGERY PROGRESS NOTE    9/19/2023 11:30 AM                               Bibi Bullock                      LOS: 1 day               Subjective: Patient laying in bed upon entering the room about to go get ECHO. No acute events overnight. Patient has no specific complaints this morning. Physical Exam:  Patient seen and examined    Vitals:    09/19/23 0904   BP:    Pulse:    Resp:    Temp:    SpO2: 98%     GCS:  4 - Opens eyes on own  5 - Alert and oriented  6 - Follows simple motor commands  General: Well developed. Alert and cooperative in no acute distress. HENT: atraumatic, neck supple  Eyes: Optic discs: Not tested  Pulmonary: unlabored respiratory effort  Cardiovascular:  Warm well perfused. No peripheral edema  Gastrointestinal: abdomen soft, NT, ND     Neurological:  Mental Status: Awake, alert, oriented x 4, speech clear and appropriate  Attention: Intact  Language: No aphasia or dysarthria noted  Sensation: Intact to all extremities to light touch  Coordination: Intact     Cranial Nerves:  II: Visual acuity not tested, denies new visual changes / diplopia  III, IV, VI: PERRL, 3 mm bilaterally, EOMI, no nystagmus noted  V: Facial sensation intact bilaterally to touch  VII: Face symmetric  VIII: Hearing intact bilaterally to spoken voice  IX: Palate movement equal bilaterally  XI: Shoulder shrug equal bilaterally  XII: Tongue midline     Musculoskeletal:   Gait: Not tested   Assist devices: None   Tone: Normal  Motor strength:     Right  Left      Right  Left    Deltoid  5 5   Hip Flex  5 5   Biceps  5 5   Knee Extensors  5 5   Triceps  5 5   Knee Flexors  5 5   Wrist Ext  5 5   Ankle Dorsiflex. 5 5   Wrist Flex  5 5   Ankle Plantarflex. 5 5   Handgrip  5 5   Ext Magdi Longus  5 5   Thumb Ext  5 5              Radiological Findings:  CT HEAD WO CONTRAST  Result Date: 9/17/2023  No acute intracranial abnormality. MRI BRAIN WO CONTRAST  Result Date: 9/18/2023  1.  Left frontal cortex

## 2023-09-19 NOTE — PLAN OF CARE
Reviewed chart, patient not seen by neurocritical care today. She is on asa and neuro protocol heparin gtt with plans to repeat CTA, possible angio +/- intervention with Dr. Reji Arredondo later this week. Discussed case with neurosurgery. Little else to add than what is already being orchestrated at this time. Will continue to make ourselves available should she have any deterioration clinically, questions or concerns. Please do not hesitate to reach out.     Stan Gore NP

## 2023-09-20 ENCOUNTER — APPOINTMENT (OUTPATIENT)
Dept: CT IMAGING | Age: 53
DRG: 024 | End: 2023-09-20
Attending: INTERNAL MEDICINE
Payer: MEDICAID

## 2023-09-20 LAB
ANION GAP SERPL CALCULATED.3IONS-SCNC: 12 MMOL/L (ref 3–16)
ANTI-XA UNFRAC HEPARIN: 0.34 IU/ML (ref 0.3–0.7)
ANTI-XA UNFRAC HEPARIN: 0.38 IU/ML (ref 0.3–0.7)
BASOPHILS # BLD: 0.1 K/UL (ref 0–0.2)
BASOPHILS NFR BLD: 0.6 %
BUN SERPL-MCNC: 7 MG/DL (ref 7–20)
CALCIUM SERPL-MCNC: 9.2 MG/DL (ref 8.3–10.6)
CHLORIDE SERPL-SCNC: 106 MMOL/L (ref 99–110)
CO2 SERPL-SCNC: 23 MMOL/L (ref 21–32)
CREAT SERPL-MCNC: 0.5 MG/DL (ref 0.6–1.1)
DEPRECATED RDW RBC AUTO: 13.1 % (ref 12.4–15.4)
EOSINOPHIL # BLD: 0.3 K/UL (ref 0–0.6)
EOSINOPHIL NFR BLD: 2.2 %
GFR SERPLBLD CREATININE-BSD FMLA CKD-EPI: >60 ML/MIN/{1.73_M2}
GLUCOSE SERPL-MCNC: 106 MG/DL (ref 70–99)
HCT VFR BLD AUTO: 40.6 % (ref 36–48)
HGB BLD-MCNC: 14.1 G/DL (ref 12–16)
LYMPHOCYTES # BLD: 4.3 K/UL (ref 1–5.1)
LYMPHOCYTES NFR BLD: 36.4 %
MCH RBC QN AUTO: 30.6 PG (ref 26–34)
MCHC RBC AUTO-ENTMCNC: 34.9 G/DL (ref 31–36)
MCV RBC AUTO: 87.7 FL (ref 80–100)
MONOCYTES # BLD: 0.8 K/UL (ref 0–1.3)
MONOCYTES NFR BLD: 6.7 %
NEUTROPHILS # BLD: 6.4 K/UL (ref 1.7–7.7)
NEUTROPHILS NFR BLD: 54.1 %
PLATELET # BLD AUTO: 266 K/UL (ref 135–450)
PMV BLD AUTO: 8.3 FL (ref 5–10.5)
POTASSIUM SERPL-SCNC: 3.8 MMOL/L (ref 3.5–5.1)
RBC # BLD AUTO: 4.62 M/UL (ref 4–5.2)
SODIUM SERPL-SCNC: 141 MMOL/L (ref 136–145)
WBC # BLD AUTO: 11.8 K/UL (ref 4–11)

## 2023-09-20 PROCEDURE — 6360000002 HC RX W HCPCS: Performed by: HOSPITALIST

## 2023-09-20 PROCEDURE — 6360000002 HC RX W HCPCS: Performed by: NURSE PRACTITIONER

## 2023-09-20 PROCEDURE — 97530 THERAPEUTIC ACTIVITIES: CPT

## 2023-09-20 PROCEDURE — 97116 GAIT TRAINING THERAPY: CPT

## 2023-09-20 PROCEDURE — 6370000000 HC RX 637 (ALT 250 FOR IP): Performed by: NURSE PRACTITIONER

## 2023-09-20 PROCEDURE — 6370000000 HC RX 637 (ALT 250 FOR IP): Performed by: HOSPITALIST

## 2023-09-20 PROCEDURE — 6360000004 HC RX CONTRAST MEDICATION: Performed by: NURSE PRACTITIONER

## 2023-09-20 PROCEDURE — 80048 BASIC METABOLIC PNL TOTAL CA: CPT

## 2023-09-20 PROCEDURE — 2060000000 HC ICU INTERMEDIATE R&B

## 2023-09-20 PROCEDURE — 36415 COLL VENOUS BLD VENIPUNCTURE: CPT

## 2023-09-20 PROCEDURE — 70498 CT ANGIOGRAPHY NECK: CPT

## 2023-09-20 PROCEDURE — 6370000000 HC RX 637 (ALT 250 FOR IP): Performed by: INTERNAL MEDICINE

## 2023-09-20 PROCEDURE — 99233 SBSQ HOSP IP/OBS HIGH 50: CPT | Performed by: NURSE PRACTITIONER

## 2023-09-20 PROCEDURE — 85520 HEPARIN ASSAY: CPT

## 2023-09-20 PROCEDURE — 99231 SBSQ HOSP IP/OBS SF/LOW 25: CPT | Performed by: NURSE PRACTITIONER

## 2023-09-20 PROCEDURE — 94640 AIRWAY INHALATION TREATMENT: CPT

## 2023-09-20 PROCEDURE — 85025 COMPLETE CBC W/AUTO DIFF WBC: CPT

## 2023-09-20 PROCEDURE — 2580000003 HC RX 258: Performed by: INTERNAL MEDICINE

## 2023-09-20 RX ADMIN — HEPARIN SODIUM 17 UNITS/KG/HR: 10000 INJECTION, SOLUTION INTRAVENOUS at 15:28

## 2023-09-20 RX ADMIN — ARFORMOTEROL TARTRATE 15 MCG: 15 SOLUTION RESPIRATORY (INHALATION) at 09:42

## 2023-09-20 RX ADMIN — ASPIRIN 81 MG: 81 TABLET, CHEWABLE ORAL at 10:32

## 2023-09-20 RX ADMIN — IOPAMIDOL 75 ML: 755 INJECTION, SOLUTION INTRAVENOUS at 17:38

## 2023-09-20 RX ADMIN — ARFORMOTEROL TARTRATE 15 MCG: 15 SOLUTION RESPIRATORY (INHALATION) at 21:22

## 2023-09-20 RX ADMIN — BUDESONIDE 250 MCG: 0.25 INHALANT RESPIRATORY (INHALATION) at 09:42

## 2023-09-20 RX ADMIN — SODIUM CHLORIDE, PRESERVATIVE FREE 10 ML: 5 INJECTION INTRAVENOUS at 10:32

## 2023-09-20 RX ADMIN — TIOTROPIUM BROMIDE INHALATION SPRAY 2 PUFF: 3.12 SPRAY, METERED RESPIRATORY (INHALATION) at 09:53

## 2023-09-20 RX ADMIN — ATORVASTATIN CALCIUM 80 MG: 80 TABLET, FILM COATED ORAL at 20:24

## 2023-09-20 RX ADMIN — SERTRALINE HYDROCHLORIDE 50 MG: 50 TABLET ORAL at 10:32

## 2023-09-20 RX ADMIN — BUDESONIDE 250 MCG: 0.25 INHALANT RESPIRATORY (INHALATION) at 21:22

## 2023-09-20 RX ADMIN — HEPARIN SODIUM 17 UNITS/KG/HR: 10000 INJECTION, SOLUTION INTRAVENOUS at 01:07

## 2023-09-20 RX ADMIN — Medication 5 MG: at 20:25

## 2023-09-20 ASSESSMENT — ENCOUNTER SYMPTOMS
WHEEZING: 0
ABDOMINAL PAIN: 0
SORE THROAT: 0
COLOR CHANGE: 0
SINUS PRESSURE: 0
VOMITING: 0
COUGH: 0
NAUSEA: 0
CONSTIPATION: 0
BACK PAIN: 0
SINUS PAIN: 0
DIARRHEA: 0
SHORTNESS OF BREATH: 0

## 2023-09-20 NOTE — PROGRESS NOTES
NEUROLOGY / NEUROCRITICAL CARE PROGRESS NOTE       Patient Name: Gael Engle YOB: 1970   Sex: Female Age: 48 yrs     CC / Reason for Consult: left ICA stenosis/stroke    Interval Hx / Changes over last 24 hours:   Talking on the phone with someone on my visit, does not stop conversation for examination  No adverse events overnight  Plan for repeat CT-A today     09/19/23 01:20 09/19/23 11:53 09/19/23 19:32 09/20/23 03:11   Anti-XA Unfrac Heparin 0.10 (L) 0.18 (L)  0.13 (L) 0.29 (L) 0.38       ROS: no weakness; no aphasia    HISTORY   Admission HPI:   54yo woman presented with almost 3 months of daily episodes involving vision loss in her left eye. She describes the onset of blurred vision in that eye which then progresses to speckled vision (black & white \"snow\"), followed by complete vision loss. This is then followed by a sense of a spinning sensation as well as shortness of breath. The episodes will last several minutes at a time. She can have anywhere between 5 and 10 per day. She has no other symptoms associated with these and can identify no triggers. They occur randomly whether she is laying, sitting, or standing. Position change does not trigger them. Nothing makes them better. They simply resolve on their own. She has seen an eye doctor about them and has been told that her eyes are normal.      she is unsure what would have precipitated these symptoms, other than the above. she feels that nothing makes them better and nothing makes them worse. she rates the symptoms as severe. she denies any other associated symptoms including no HA, no speech/language difficulties, no swallowing difficulties, no other visual changes, no diplopia, no hearing loss, no tinnitus, no other vertigo, no imbalance, no light-headedness, no focal weakness, no sensory changes, no nausea or vomiting. she has never had this problem before.  There is no history of this problem or anything similar in her

## 2023-09-20 NOTE — PLAN OF CARE
Problem: Discharge Planning  Goal: Discharge to home or other facility with appropriate resources  9/20/2023 0050 by Stephanie Christina RN  Outcome: Progressing  Flowsheets (Taken 9/20/2023 0050)  Discharge to home or other facility with appropriate resources: Identify barriers to discharge with patient and caregiver     Problem: Safety - Adult  Goal: Free from fall injury  9/20/2023 0050 by Stephanie Christina RN  Outcome: Progressing  Note: Pt is a Fall Risk. See Lydia Rosenthal Fall Risk Score. Pt bed in low position and side rails up. Call light and belongings in reach. Pt encouraged to call for assistance. Will continue with hourly rounds for PO intake, pain needs, toileting, and repositioning as needed.         Problem: ABCDS Injury Assessment  Goal: Absence of physical injury  9/20/2023 0050 by Stephanie Christina RN  Outcome: Progressing  Flowsheets (Taken 9/20/2023 0050)  Absence of Physical Injury: Implement safety measures based on patient assessment     Problem: Neurosensory - Adult  Goal: Achieves stable or improved neurological status  9/20/2023 0050 by Stephanie Christina RN  Outcome: Progressing  Flowsheets (Taken 9/20/2023 0050)  Achieves stable or improved neurological status: Assess for and report changes in neurological status     Problem: Neurosensory - Adult  Goal: Achieves maximal functionality and self care  Outcome: Progressing  Flowsheets (Taken 9/20/2023 0050)  Achieves maximal functionality and self care:   Monitor swallowing and airway patency with patient fatigue and changes in neurological status   Encourage and assist patient to increase activity and self care with guidance from physical therapy/occupational therapy     Problem: Respiratory - Adult  Goal: Achieves optimal ventilation and oxygenation  9/20/2023 0050 by Stephanie Christina RN  Outcome: Progressing  Flowsheets (Taken 9/20/2023 0050)  Achieves optimal ventilation and oxygenation:   Assess for changes in respiratory status   Assess for

## 2023-09-20 NOTE — CARE COORDINATION
Case Management Assessment  Initial Evaluation    Date/Time of Evaluation: 9/20/2023 5:10 PM  Assessment Completed by: Kacy Espinoza RN    If patient is discharged prior to next notation, then this note serves as note for discharge by case management. Patient Name: Austin Khalil                   YOB: 1970  Diagnosis: TIA (transient ischemic attack) [G45.9]                   Date / Time: 9/18/2023  3:40 AM    Patient Admission Status: Inpatient   Readmission Risk (Low < 19, Mod (19-27), High > 27): Readmission Risk Score: 12.6    Current PCP: DEANNA Del Rio  PCP verified by CM? Yes    Chart Reviewed: Yes      History Provided by: Patient  Patient Orientation: Alert and Oriented, Person, Place, Situation    Patient Cognition: Alert    Hospitalization in the last 30 days (Readmission):  No    If yes, Readmission Assessment in  Navigator will be completed. Advance Directives:      Code Status: Full Code   Patient's Primary Decision Maker is: Legal Next of Kin      Discharge Planning:    Patient lives with: Family Members, Children Type of Home: Apartment  Primary Care Giver: Self  Patient Support Systems include: Spouse/Significant Other, Children, Family Members   Current Financial resources:    Current community resources:    Current services prior to admission: None            Current DME:              Type of Home Care services:  None    ADLS  Prior functional level: Assistance with the following:, Shopping, Housework  Current functional level: Housework, Shopping, Independent in ADLs/IADLs    PT AM-PAC: 22 /24  OT AM-PAC: 20 /24    Family can provide assistance at DC: Yes  Would you like Case Management to discuss the discharge plan with any other family members/significant others, and if so, who?  No  Plans to Return to Present Housing: Yes  Other Identified Issues/Barriers to RETURNING to current housing: TIA  Potential Assistance needed at discharge: N/A            Potential

## 2023-09-20 NOTE — PROGRESS NOTES
V2.0  [facility] Hospitalist Progress Note      Name:  Lizzeth Rivers /Age/Sex: 1970  (48 y.o. female)   MRN & CSN:  [id number] & [id number] Encounter Date/Time: 2023 12:39 PM EDT    Location:  [facility]9/2886[facility]01 PCP: Nick Burnett Day: 3    Assessment and Plan:   Lizzeth Rivers is a 48 y.o. female with pmh of , COPD, tobacco abuse, depression, prediabetes , restless leg syndrome, hyperlipidemia, hypothyroidism, , anxiety and recent diagnosis of left ICA severe stenosis  who presents with TIA (transient ischemic attack)      Plan:       Recurrent episodes of dizziness and loss of vision  Left frontal cortex punctuate acute infarction  Steno-occlusive disease of the left ICA  Possible right subclavian artery stenosis  Possible severe steno-occlusive disease of the left posterior cerebral artery  - Continue aspirin  - Repeat lipid panel and increase atorvastatin to 80 mg  - PT/OT/ST  - Continue counseling for smoking cessation  - Further recommendations per neurology/neurovascular teams  - Heparin drip  - Follow-up repeat CTA today  - Likely will need angiography later this week with neuro interventional.     Asthma/COPD, stable  Tobacco abuse  Depression/anxiety  Prediabetes  RLS  Hyperlipidemia  Hypothyroidism  - Continue other home meds       Diet ADULT DIET;  Regular; Low Fat/Low Chol/High Fiber/CHELSEA  Diet NPO Exceptions are: Sips of Water with Meds   DVT Prophylaxis [] Lovenox, [x]  Heparin, [] SCDs, [] Ambulation,    [] Eliquis, [] Xarelto  [] Coumadin [] other   Code Status Full Code   Disposition From: home  Expected Disposition: TBD, likely home  Estimated Date of Discharge: 3-5 days  Patient requires continued admission due to neuro clearance and likely angiography   Surrogate Decision Maker/ POA      Personally reviewed Lab Studies and Imaging     Discussed management of the case with neurosurgery who recommended repeating imaging    EKG interpreted personally and results no from the previous exam obtained on 06/26/2023. Recommend surgical consultation. CT HEAD WO CONTRAST    Result Date: 9/17/2023  EXAMINATION: CT OF THE HEAD WITHOUT CONTRAST  9/17/2023 11:18 pm TECHNIQUE: CT of the head was performed without the administration of intravenous contrast. Automated exposure control, iterative reconstruction, and/or weight based adjustment of the mA/kV was utilized to reduce the radiation dose to as low as reasonably achievable. COMPARISON: June 26, 2023. HISTORY: ORDERING SYSTEM PROVIDED HISTORY: dizziness TECHNOLOGIST PROVIDED HISTORY: Reason for exam:->dizziness Has a \"code stroke\" or \"stroke alert\" been called? ->No Decision Support Exception - unselect if not a suspected or confirmed emergency medical condition->Emergency Medical Condition (MA) Is the patient pregnant?->No Reason for Exam: Dizziness Additional signs and symptoms: Dizziness - dizzy, pain shooting across shoulder blades, L eye \"blacking out\" though this has happened before. Has been going on for months; dizziness; left eye \"going black\"; pain between shoulder blades; broke her phone months ago and could not follow up with anyone because she did not have the phone numbers. Relevant Medical/Surgical History: Current every day smoker. Hx of anxiety, asthma, COPD, obesity, & tonsillectomy. No hx of any cancer. FINDINGS: BRAIN/VENTRICLES: There is no acute intracranial hemorrhage, mass effect or midline shift. No abnormal extra-axial fluid collection. The gray-white differentiation is maintained without evidence of an acute infarct. There is prominence of the ventricles and sulci due to global parenchymal volume loss. There are nonspecific areas of hypoattenuation within the periventricular and subcortical white matter, which likely represent chronic microvascular ischemic change. ORBITS: The visualized portion of the orbits demonstrate no acute abnormality.  SINUSES: The visualized paranasal sinuses and mastoid air

## 2023-09-20 NOTE — PROGRESS NOTES
NEUROSURGERY PROGRESS NOTE    9/20/2023 11:17 AM                               Bibi GRIGSBY Kobe                      LOS: 2 days               Subjective: No acute events overnight. Patient has no specific complaints this morning. Physical Exam:  Patient seen and examined    Vitals:    09/20/23 0945   BP:    Pulse:    Resp: 20   Temp:    SpO2: 98%     GCS:  4 - Opens eyes on own  5 - Alert and oriented  6 - Follows simple motor commands  General: Well developed. Alert and cooperative in no acute distress. HENT: atraumatic, neck supple  Eyes: Optic discs: Not tested  Pulmonary: unlabored respiratory effort  Cardiovascular:  Warm well perfused. No peripheral edema  Gastrointestinal: abdomen soft, NT, ND     Neurological:  Mental Status: Awake, alert, oriented x 4, speech clear and appropriate  Attention: Intact  Language: No aphasia or dysarthria noted  Sensation: Intact to all extremities to light touch  Coordination: Intact     Cranial Nerves:  II: Visual acuity not tested, denies new visual changes / diplopia  III, IV, VI: PERRL, 3 mm bilaterally, EOMI, no nystagmus noted  V: Facial sensation intact bilaterally to touch  VII: Face symmetric  VIII: Hearing intact bilaterally to spoken voice  IX: Palate movement equal bilaterally  XI: Shoulder shrug equal bilaterally  XII: Tongue midline     Musculoskeletal:   Gait: Not tested   Assist devices: None   Tone: Normal  Motor strength:     Right  Left      Right  Left    Deltoid  5 5   Hip Flex  5 5   Biceps  5 5   Knee Extensors  5 5   Triceps  5 5   Knee Flexors  5 5   Wrist Ext  5 5   Ankle Dorsiflex. 5 5   Wrist Flex  5 5   Ankle Plantarflex. 5 5   Handgrip  5 5   Ext Magdi Longus  5 5   Thumb Ext  5 5              Radiological Findings:  CT HEAD WO CONTRAST  Result Date: 9/17/2023  No acute intracranial abnormality. MRI BRAIN WO CONTRAST  Result Date: 9/18/2023  1. Left frontal cortex punctate acute infarction  2.  Steno-occlusive disease of the

## 2023-09-20 NOTE — PLAN OF CARE
Problem: Safety - Adult  Goal: Free from fall injury  Outcome: Progressing  Flowsheets (Taken 9/20/2023 1618)  Free From Fall Injury:   Instruct family/caregiver on patient safety   Based on caregiver fall risk screen, instruct family/caregiver to ask for assistance with transferring infant if caregiver noted to have fall risk factors  Note: Katalina Gay Fall Risk Assessment completed this shift, pt scored as a high fall risk. Pt in bed, bed alarm on, bed wheels locked, bed in lowest position, 2/4 side rails raised. Call light, bedside table, and pt's personal belongings within reach. Pt educated on the need to use the call light and to wait for staff to arrive at bedside prior to attempting to get out of bed. Problem: Neurosensory - Adult  Goal: Achieves stable or improved neurological status  Outcome: Progressing  Flowsheets (Taken 9/20/2023 1618)  Achieves stable or improved neurological status:   Assess for and report changes in neurological status   Initiate measures to prevent increased intracranial pressure   Maintain blood pressure and fluid volume within ordered parameters to optimize cerebral perfusion and minimize risk of hemorrhage  Note: Pt continues to have nystagmus present, neuro exam otherwise within defined limits.

## 2023-09-20 NOTE — PROGRESS NOTES
Physical Therapy  Facility/Department: [place] [address] [facility]  Physical Therapy treatment note    Name: Austin Khalil  : 1970  MRN: [id number]  Date of Service: 2023    Discharge Recommendations:Bibi Barnett scored a  on the AM-PAC short mobility form. Current research shows that an AM-PAC score of 18 or greater is typically associated with a discharge to the patient's home setting. Based on the patient's AM-PAC score and their current functional mobility deficits, it is recommended that the patient have 2-3 sessions per week of Physical Therapy at d/c to increase the patient's independence. At this time, this patient demonstrates the endurance and safety to discharge home with (home services) and a follow up treatment frequency of 2-3x/wk. Please see assessment section for further patient specific details. If patient discharges prior to next session this note will serve as a discharge summary. Please see below for the latest assessment towards goals. PT Equipment Recommendations  Equipment Needed:  (RW if home)      Patient Diagnosis(es): There were no encounter diagnoses. Past Medical History:  has a past medical history of Anxiety, Asthma, COPD (chronic obstructive pulmonary disease) ([address]), Depression, Endometriosis, and Knee pain. Past Surgical History:  has a past surgical history that includes Tonsillectomy;  section; hernia repair; Endometrial ablation; Hysterectomy; Incontinence surgery (2017); and Knee arthroscopy (Right). Assessment   Assessment: 47 yo admitted 25 for CVA/dizziness. Pt demo mobility below her reported baseline of independent at home with decreasing mobility due to increasing episodes of dizziness when standing. Pt denies any change in symptoms since admission and that she is pending testing tomorrow to determine if she will receive  a stent or what the next step will be for her. Pt plans to return home at discharge.  If home, recommend 24 hour Patient  Education Provided: Role of Therapy  Education Provided Comments: importance of OOB mobility, need to call for assist to get up  Education Method: Verbal;Demonstration  Barriers to Learning: None  Education Outcome: Verbalized understanding;Continued education needed      Therapy Time   Individual Concurrent Group Co-treatment   Time In 1135         Time Out 1200         Minutes 25          Timed Code Treatment Minutes:25       Total Treatment Minutes:   25       Meryle Gary, PT

## 2023-09-21 ENCOUNTER — APPOINTMENT (OUTPATIENT)
Dept: INTERVENTIONAL RADIOLOGY/VASCULAR | Age: 53
DRG: 024 | End: 2023-09-21
Attending: INTERNAL MEDICINE
Payer: MEDICAID

## 2023-09-21 LAB
ANION GAP SERPL CALCULATED.3IONS-SCNC: 14 MMOL/L (ref 3–16)
ANTI-XA UNFRAC HEPARIN: 0.27 IU/ML (ref 0.3–0.7)
ANTI-XA UNFRAC HEPARIN: 0.38 IU/ML (ref 0.3–0.7)
BASOPHILS # BLD: 0.1 K/UL (ref 0–0.2)
BASOPHILS NFR BLD: 0.9 %
BUN SERPL-MCNC: 9 MG/DL (ref 7–20)
CALCIUM SERPL-MCNC: 9.7 MG/DL (ref 8.3–10.6)
CHLORIDE SERPL-SCNC: 104 MMOL/L (ref 99–110)
CO2 SERPL-SCNC: 24 MMOL/L (ref 21–32)
CREAT SERPL-MCNC: 0.6 MG/DL (ref 0.6–1.1)
DEPRECATED RDW RBC AUTO: 13.2 % (ref 12.4–15.4)
EOSINOPHIL # BLD: 0.2 K/UL (ref 0–0.6)
EOSINOPHIL NFR BLD: 2 %
GFR SERPLBLD CREATININE-BSD FMLA CKD-EPI: >60 ML/MIN/{1.73_M2}
GLUCOSE SERPL-MCNC: 92 MG/DL (ref 70–99)
HCT VFR BLD AUTO: 41.6 % (ref 36–48)
HGB BLD-MCNC: 14.3 G/DL (ref 12–16)
LYMPHOCYTES # BLD: 3.4 K/UL (ref 1–5.1)
LYMPHOCYTES NFR BLD: 28.9 %
MCH RBC QN AUTO: 30.1 PG (ref 26–34)
MCHC RBC AUTO-ENTMCNC: 34.3 G/DL (ref 31–36)
MCV RBC AUTO: 87.6 FL (ref 80–100)
MONOCYTES # BLD: 0.8 K/UL (ref 0–1.3)
MONOCYTES NFR BLD: 6.6 %
NEUTROPHILS # BLD: 7.3 K/UL (ref 1.7–7.7)
NEUTROPHILS NFR BLD: 61.6 %
PLATELET # BLD AUTO: 284 K/UL (ref 135–450)
PMV BLD AUTO: 8 FL (ref 5–10.5)
POTASSIUM SERPL-SCNC: 3.8 MMOL/L (ref 3.5–5.1)
RBC # BLD AUTO: 4.75 M/UL (ref 4–5.2)
SODIUM SERPL-SCNC: 142 MMOL/L (ref 136–145)
WBC # BLD AUTO: 11.9 K/UL (ref 4–11)

## 2023-09-21 PROCEDURE — 6370000000 HC RX 637 (ALT 250 FOR IP)

## 2023-09-21 PROCEDURE — 99231 SBSQ HOSP IP/OBS SF/LOW 25: CPT | Performed by: NURSE PRACTITIONER

## 2023-09-21 PROCEDURE — 6360000004 HC RX CONTRAST MEDICATION: Performed by: STUDENT IN AN ORGANIZED HEALTH CARE EDUCATION/TRAINING PROGRAM

## 2023-09-21 PROCEDURE — C1760 CLOSURE DEV, VASC: HCPCS

## 2023-09-21 PROCEDURE — 6360000002 HC RX W HCPCS: Performed by: HOSPITALIST

## 2023-09-21 PROCEDURE — 36223 PLACE CATH CAROTID/INOM ART: CPT

## 2023-09-21 PROCEDURE — 2060000000 HC ICU INTERMEDIATE R&B

## 2023-09-21 PROCEDURE — 85520 HEPARIN ASSAY: CPT

## 2023-09-21 PROCEDURE — 6370000000 HC RX 637 (ALT 250 FOR IP): Performed by: NURSE PRACTITIONER

## 2023-09-21 PROCEDURE — B31C1ZZ FLUOROSCOPY OF BILATERAL EXTERNAL CAROTID ARTERIES USING LOW OSMOLAR CONTRAST: ICD-10-PCS | Performed by: STUDENT IN AN ORGANIZED HEALTH CARE EDUCATION/TRAINING PROGRAM

## 2023-09-21 PROCEDURE — B31N1ZZ FLUOROSCOPY OF OTHER UPPER ARTERIES USING LOW OSMOLAR CONTRAST: ICD-10-PCS | Performed by: STUDENT IN AN ORGANIZED HEALTH CARE EDUCATION/TRAINING PROGRAM

## 2023-09-21 PROCEDURE — B3181ZZ FLUOROSCOPY OF BILATERAL INTERNAL CAROTID ARTERIES USING LOW OSMOLAR CONTRAST: ICD-10-PCS | Performed by: STUDENT IN AN ORGANIZED HEALTH CARE EDUCATION/TRAINING PROGRAM

## 2023-09-21 PROCEDURE — 36226 PLACE CATH VERTEBRAL ART: CPT

## 2023-09-21 PROCEDURE — B31Q1ZZ FLUOROSCOPY OF CERVICO-CEREBRAL ARCH USING LOW OSMOLAR CONTRAST: ICD-10-PCS | Performed by: STUDENT IN AN ORGANIZED HEALTH CARE EDUCATION/TRAINING PROGRAM

## 2023-09-21 PROCEDURE — C1894 INTRO/SHEATH, NON-LASER: HCPCS

## 2023-09-21 PROCEDURE — 94640 AIRWAY INHALATION TREATMENT: CPT

## 2023-09-21 PROCEDURE — 2580000003 HC RX 258: Performed by: INTERNAL MEDICINE

## 2023-09-21 PROCEDURE — B3151ZZ FLUOROSCOPY OF BILATERAL COMMON CAROTID ARTERIES USING LOW OSMOLAR CONTRAST: ICD-10-PCS | Performed by: STUDENT IN AN ORGANIZED HEALTH CARE EDUCATION/TRAINING PROGRAM

## 2023-09-21 PROCEDURE — 99231 SBSQ HOSP IP/OBS SF/LOW 25: CPT

## 2023-09-21 PROCEDURE — B3121ZZ FLUOROSCOPY OF LEFT SUBCLAVIAN ARTERY USING LOW OSMOLAR CONTRAST: ICD-10-PCS | Performed by: STUDENT IN AN ORGANIZED HEALTH CARE EDUCATION/TRAINING PROGRAM

## 2023-09-21 PROCEDURE — 6360000002 HC RX W HCPCS: Performed by: NURSE PRACTITIONER

## 2023-09-21 PROCEDURE — C1769 GUIDE WIRE: HCPCS

## 2023-09-21 PROCEDURE — 99152 MOD SED SAME PHYS/QHP 5/>YRS: CPT

## 2023-09-21 PROCEDURE — 80048 BASIC METABOLIC PNL TOTAL CA: CPT

## 2023-09-21 PROCEDURE — 85025 COMPLETE CBC W/AUTO DIFF WBC: CPT

## 2023-09-21 PROCEDURE — 6370000000 HC RX 637 (ALT 250 FOR IP): Performed by: HOSPITALIST

## 2023-09-21 PROCEDURE — 2580000003 HC RX 258: Performed by: NURSE PRACTITIONER

## 2023-09-21 PROCEDURE — B41G1ZZ FLUOROSCOPY OF LEFT LOWER EXTREMITY ARTERIES USING LOW OSMOLAR CONTRAST: ICD-10-PCS | Performed by: STUDENT IN AN ORGANIZED HEALTH CARE EDUCATION/TRAINING PROGRAM

## 2023-09-21 PROCEDURE — 36415 COLL VENOUS BLD VENIPUNCTURE: CPT

## 2023-09-21 PROCEDURE — 6370000000 HC RX 637 (ALT 250 FOR IP): Performed by: INTERNAL MEDICINE

## 2023-09-21 PROCEDURE — 99153 MOD SED SAME PHYS/QHP EA: CPT

## 2023-09-21 PROCEDURE — B31G1ZZ FLUOROSCOPY OF BILATERAL VERTEBRAL ARTERIES USING LOW OSMOLAR CONTRAST: ICD-10-PCS | Performed by: STUDENT IN AN ORGANIZED HEALTH CARE EDUCATION/TRAINING PROGRAM

## 2023-09-21 PROCEDURE — 2709999900 HC NON-CHARGEABLE SUPPLY

## 2023-09-21 RX ORDER — METHOCARBAMOL 500 MG/1
1000 TABLET, FILM COATED ORAL 3 TIMES DAILY
Status: DISCONTINUED | OUTPATIENT
Start: 2023-09-21 | End: 2023-09-23

## 2023-09-21 RX ORDER — OXYCODONE HYDROCHLORIDE 5 MG/1
5 TABLET ORAL EVERY 4 HOURS PRN
Status: DISCONTINUED | OUTPATIENT
Start: 2023-09-21 | End: 2023-09-23 | Stop reason: HOSPADM

## 2023-09-21 RX ORDER — IODIXANOL 270 MG/ML
200 INJECTION, SOLUTION INTRAVASCULAR ONCE
Status: COMPLETED | OUTPATIENT
Start: 2023-09-21 | End: 2023-09-21

## 2023-09-21 RX ORDER — OXYCODONE HYDROCHLORIDE 5 MG/1
10 TABLET ORAL EVERY 4 HOURS PRN
Status: DISCONTINUED | OUTPATIENT
Start: 2023-09-21 | End: 2023-09-23 | Stop reason: HOSPADM

## 2023-09-21 RX ORDER — IPRATROPIUM BROMIDE AND ALBUTEROL SULFATE 2.5; .5 MG/3ML; MG/3ML
1 SOLUTION RESPIRATORY (INHALATION) EVERY 4 HOURS PRN
Status: DISCONTINUED | OUTPATIENT
Start: 2023-09-21 | End: 2023-09-23 | Stop reason: HOSPADM

## 2023-09-21 RX ORDER — HEPARIN SODIUM 10000 [USP'U]/100ML
5-30 INJECTION, SOLUTION INTRAVENOUS CONTINUOUS
Status: DISCONTINUED | OUTPATIENT
Start: 2023-09-21 | End: 2023-09-22

## 2023-09-21 RX ADMIN — IODIXANOL 120 ML: 270 INJECTION, SOLUTION INTRAVASCULAR at 12:23

## 2023-09-21 RX ADMIN — ASPIRIN 81 MG: 81 TABLET, CHEWABLE ORAL at 08:11

## 2023-09-21 RX ADMIN — SODIUM CHLORIDE, PRESERVATIVE FREE 10 ML: 5 INJECTION INTRAVENOUS at 08:11

## 2023-09-21 RX ADMIN — METHOCARBAMOL 1000 MG: 100 INJECTION INTRAMUSCULAR; INTRAVENOUS at 13:51

## 2023-09-21 RX ADMIN — ARFORMOTEROL TARTRATE 15 MCG: 15 SOLUTION RESPIRATORY (INHALATION) at 21:17

## 2023-09-21 RX ADMIN — OXYCODONE HYDROCHLORIDE 10 MG: 5 TABLET ORAL at 20:37

## 2023-09-21 RX ADMIN — BUDESONIDE 250 MCG: 0.25 INHALANT RESPIRATORY (INHALATION) at 08:06

## 2023-09-21 RX ADMIN — Medication 5 MG: at 20:38

## 2023-09-21 RX ADMIN — OXYCODONE HYDROCHLORIDE 10 MG: 5 TABLET ORAL at 13:24

## 2023-09-21 RX ADMIN — TIOTROPIUM BROMIDE INHALATION SPRAY 2 PUFF: 3.12 SPRAY, METERED RESPIRATORY (INHALATION) at 08:06

## 2023-09-21 RX ADMIN — HEPARIN SODIUM 17 UNITS/KG/HR: 10000 INJECTION, SOLUTION INTRAVENOUS at 14:30

## 2023-09-21 RX ADMIN — ARFORMOTEROL TARTRATE 15 MCG: 15 SOLUTION RESPIRATORY (INHALATION) at 08:06

## 2023-09-21 RX ADMIN — METHOCARBAMOL TABLETS 1000 MG: 500 TABLET, COATED ORAL at 23:34

## 2023-09-21 RX ADMIN — HEPARIN SODIUM 17 UNITS/KG/HR: 10000 INJECTION, SOLUTION INTRAVENOUS at 09:00

## 2023-09-21 RX ADMIN — SERTRALINE HYDROCHLORIDE 50 MG: 50 TABLET ORAL at 08:11

## 2023-09-21 RX ADMIN — BUDESONIDE 250 MCG: 0.25 INHALANT RESPIRATORY (INHALATION) at 21:17

## 2023-09-21 RX ADMIN — ATORVASTATIN CALCIUM 80 MG: 80 TABLET, FILM COATED ORAL at 20:38

## 2023-09-21 ASSESSMENT — PAIN DESCRIPTION - LOCATION
LOCATION: BACK
LOCATION: GROIN

## 2023-09-21 ASSESSMENT — ENCOUNTER SYMPTOMS
SORE THROAT: 0
WHEEZING: 0
COUGH: 0
ABDOMINAL PAIN: 0
CONSTIPATION: 0
SHORTNESS OF BREATH: 0
BACK PAIN: 0
VOMITING: 0
SINUS PAIN: 0
NAUSEA: 0
DIARRHEA: 0
SINUS PRESSURE: 0
COLOR CHANGE: 0

## 2023-09-21 ASSESSMENT — PAIN SCALES - GENERAL
PAINLEVEL_OUTOF10: 8
PAINLEVEL_OUTOF10: 0
PAINLEVEL_OUTOF10: 2
PAINLEVEL_OUTOF10: 8

## 2023-09-21 ASSESSMENT — PAIN DESCRIPTION - ONSET: ONSET: GRADUAL

## 2023-09-21 ASSESSMENT — PAIN DESCRIPTION - PAIN TYPE: TYPE: SURGICAL PAIN

## 2023-09-21 ASSESSMENT — PAIN DESCRIPTION - ORIENTATION: ORIENTATION: RIGHT

## 2023-09-21 ASSESSMENT — PAIN DESCRIPTION - DESCRIPTORS
DESCRIPTORS: ACHING
DESCRIPTORS: ACHING

## 2023-09-21 ASSESSMENT — PAIN DESCRIPTION - FREQUENCY: FREQUENCY: INTERMITTENT

## 2023-09-21 ASSESSMENT — PAIN - FUNCTIONAL ASSESSMENT: PAIN_FUNCTIONAL_ASSESSMENT: ACTIVITIES ARE NOT PREVENTED

## 2023-09-21 NOTE — PROGRESS NOTES
Patient taken down to cath lab. Heparin gtt stopped prior to leaving the unit by [facility] NP. Patient will transfer to PCU after procedure. No other questions or concerns at this time.

## 2023-09-21 NOTE — PLAN OF CARE
Problem: Discharge Planning  Goal: Discharge to home or other facility with appropriate resources  Outcome: Progressing     Problem: Safety - Adult  Goal: Free from fall injury  9/21/2023 0031 by Kami Dacosta RN  Outcome: Progressing     Problem: ABCDS Injury Assessment  Goal: Absence of physical injury  Outcome: Progressing     Problem: Neurosensory - Adult  Goal: Achieves stable or improved neurological status  9/21/2023 0031 by Kami Dacosta RN  Outcome: Progressing       Problem: Pain  Goal: Verbalizes/displays adequate comfort level or baseline comfort level  Outcome: Progressing     Problem: Cardiovascular - Adult  Goal: Maintains optimal cardiac output and hemodynamic stability  Outcome: Progressing

## 2023-09-21 NOTE — PROGRESS NOTES
Pt is transferred from PCU to room 5507. A&Ox 4. Stable vitals on RA. Pt Is getting up to the bathroom. Pt is continuous heparin drip. intermittent visual changes on her left eye. No acute neuro changes, All standard precaution are in placed. Will continue monitor. ...

## 2023-09-21 NOTE — PROGRESS NOTES
Occupational Therapy  Attempt Treatment    Chart reviewed. RN consulted. Pt off floor for Cerebral Angiogram. Will follow up another time and per POC. Cont with LISE.     Janine SIMPSON/CALISTA

## 2023-09-21 NOTE — PROCEDURES
Patient name: Sandra Shayne record number: [id number]      YOB: 1970    PROCEDURE:  Diagnostic Cerebral Angiogram    Preprocedure diagnosis: left MCA acute ischemic stroke; symptomatic left carotid artery stenosis    Postprocedure diagnosis:   There is critical, >95% stenosis of the left ICA origin, with antegrade flow through the left ICA. The cervical segment of the left ICA is severely diminutive. 2.   There is 50% stenosis at the origin of the right ICA    PROCEDURE DATE: 09/21/23      HISTORY:  aFriba Quach is a 48y.o. year old female who presented with transient dizziness, found to have an acute small left hemispheric ischemic infarct. Non invasive imaging revealed left carotid artery disease, and was unclear if the ICA was occluded or severely stenosed with antegrade flow. She was placed on heparin infusion for three days and then presents for catheter angiography for further assessment. VESSELS CATHETERIZED:  Left common carotid artery  Right common carotid artery  Left vertebral artery  Right vertebral artery  Right femoral artery      CONSENT: Prior to the procedure, the technical aspects of the procedure as well as potential risks and benefits were explained to the patient. Specifically the risks of cerebral infarction, puncture site hemorrhage, femoral nerve injury, retroperitoneal hemorrhage, hemorrhagic shock, infection, device failure, anaphylaxis, renal failure, limb loss, death, radiation effects (hair loss, cataracts, radiation burns, tumors, dementia), arterial dissection, arterial pseudoaneurysm, and arteriovenous fistula were discussed. The advantages and disadvantages of alternative procedures were presented. An opportunity to state any questions or concerns was given, and these were then addressed. Following this process, it was requested that we proceed with the proposed procedure and this was expressed in the form of a written consent.      PHYSICIAN:

## 2023-09-21 NOTE — PROGRESS NOTES
4 Eyes Skin Assessment     NAME:  Gael Engle  YOB: 1970  MEDICAL RECORD NUMBER:  [id number]    The patient is being assessed for  Admission    I agree that at least one RN has performed a thorough Head to Toe Skin Assessment on the patient. ALL assessment sites listed below have been assessed. Areas assessed by both nurses:    Head, Face, Ears, Shoulders, Back, Chest, Arms, Elbows, Hands, Sacrum. Buttock, Coccyx, Ischium, Legs. Feet and Heels, Under Medical Devices , and Other          Does the Patient have a Wound?  No noted wound(s)       Urban Prevention initiated by RN: Yes  Wound Care Orders initiated by RN: No    Pressure Injury (Stage 3,4, Unstageable, DTI, NWPT, and Complex wounds) if present, place Wound referral order by RN under : No    New Ostomies, if present place, Ostomy referral order under : No     Nurse 1 eSignature: Electronically signed by Carly Michaels RN on 9/21/23 at 1:44 AM EDT    **SHARE this note so that the co-signing nurse can place an eSignature**    Nurse 2 eSignature: {Esignature:[id number]}

## 2023-09-21 NOTE — PROGRESS NOTES
Patient returned from cath lab. A&Ox4. Denies any pain. R groin site clean dry and intact. Pulses to RLE palpable. Ambulating x1 SBA. Heparin gtt continues, next antiXa due at 2030. All needs met at this time. Will continue to monitor.

## 2023-09-21 NOTE — PROGRESS NOTES
V2.0  [facility] [place] [facility] Progress Note      Name:  Claritza Rosas /Age/Sex: 1970  (48 y.o. female)   MRN & CSN:  [id number] & [id number] Encounter Date/Time: 2023 12:39 PM EDT    Location:  5507/5507-01 PCP: Nick Mcduffie Day: 4    Assessment and Plan:   Claritza Rosas is a 48 y.o. female with pmh of , COPD, tobacco abuse, depression, prediabetes , restless leg syndrome, hyperlipidemia, hypothyroidism, , anxiety and recent diagnosis of left ICA severe stenosis  who presents with TIA (transient ischemic attack)      Plan:       Recurrent episodes of dizziness and loss of vision  Left frontal cortex punctuate acute infarction  Steno-occlusive disease of the left ICA  Possible right subclavian artery stenosis  Possible severe steno-occlusive disease of the left posterior cerebral artery  - Continue aspirin  - Repeat lipid panel and increase atorvastatin to 80 mg  - PT/OT/ST  - Continue counseling for smoking cessation  - Further recommendations per neurology/neurovascular teams  - Heparin drip  - Follow-up repeat CTA   -Plan is for an angiogram today.      Asthma/COPD, stable  Tobacco abuse  Depression/anxiety  Prediabetes  RLS  Hyperlipidemia  Hypothyroidism  - Continue other home meds       Diet Diet NPO Exceptions are: Sips of Water with Meds   DVT Prophylaxis [] Lovenox, [x]  Heparin, [] SCDs, [] Ambulation,    [] Eliquis, [] Xarelto  [] Coumadin [] other   Code Status Full Code   Disposition From: home  Expected Disposition: TBD, likely home  Estimated Date of Discharge: 3-5 days  Patient requires continued admission due to neuro clearance and likely angiography   Surrogate Decision Maker/ POA      Personally reviewed Lab Studies and Imaging     Discussed management of the case with neurosurgery who recommended repeating imaging    EKG interpreted personally and results no acute ST changes    Imaging that was interpreted personally includes MRI head and results occlusion of based adjustment of the mA/kV was utilized to reduce the radiation dose to as low as reasonably achievable. COMPARISON: June 26, 2023. HISTORY: ORDERING SYSTEM PROVIDED HISTORY: dizziness TECHNOLOGIST PROVIDED HISTORY: Reason for exam:->dizziness Has a \"code stroke\" or \"stroke alert\" been called? ->No Decision Support Exception - unselect if not a suspected or confirmed emergency medical condition->Emergency Medical Condition (MA) Is the patient pregnant?->No Reason for Exam: Dizziness Additional signs and symptoms: Dizziness - dizzy, pain shooting across shoulder blades, L eye \"blacking out\" though this has happened before. Has been going on for months; dizziness; left eye \"going black\"; pain between shoulder blades; broke her phone months ago and could not follow up with anyone because she did not have the phone numbers. Relevant Medical/Surgical History: Current every day smoker. Hx of anxiety, asthma, COPD, obesity, & tonsillectomy. No hx of any cancer. FINDINGS: BRAIN/VENTRICLES: There is no acute intracranial hemorrhage, mass effect or midline shift. No abnormal extra-axial fluid collection. The gray-white differentiation is maintained without evidence of an acute infarct. There is prominence of the ventricles and sulci due to global parenchymal volume loss. There are nonspecific areas of hypoattenuation within the periventricular and subcortical white matter, which likely represent chronic microvascular ischemic change. ORBITS: The visualized portion of the orbits demonstrate no acute abnormality. SINUSES: The visualized paranasal sinuses and mastoid air cells demonstrate no acute abnormality. SOFT TISSUES/SKULL: No acute abnormality of the visualized skull or soft tissues. No acute intracranial abnormality.      XR CHEST PORTABLE    Result Date: 9/17/2023  EXAMINATION: ONE XRAY VIEW OF THE CHEST 9/17/2023 11:20 pm COMPARISON: 06/26/2023 HISTORY: ORDERING SYSTEM PROVIDED HISTORY: sob TECHNOLOGIST

## 2023-09-21 NOTE — PLAN OF CARE
Problem: Discharge Planning  Goal: Discharge to home or other facility with appropriate resources  9/21/2023 0910 by Emilie Rosado RN  Outcome: Progressing     Problem: ABCDS Injury Assessment  Goal: Absence of physical injury  9/21/2023 0910 by Emilie Rosado RN  Outcome: Progressing     Problem: Neurosensory - Adult  Goal: Achieves stable or improved neurological status  9/21/2023 0910 by Emilie Rosado RN  Outcome: Progressing

## 2023-09-22 ENCOUNTER — ANESTHESIA EVENT (OUTPATIENT)
Dept: OPERATING ROOM | Age: 53
End: 2023-09-22
Payer: MEDICAID

## 2023-09-22 ENCOUNTER — ANESTHESIA (OUTPATIENT)
Dept: OPERATING ROOM | Age: 53
End: 2023-09-22
Payer: MEDICAID

## 2023-09-22 LAB
ACTIVATED CLOTTING TIME: 154 SEC (ref 99–130)
ACTIVATED CLOTTING TIME: 165 SEC (ref 99–130)
ACTIVATED CLOTTING TIME: 182 SEC (ref 99–130)
ACTIVATED CLOTTING TIME: 213 SEC (ref 99–130)
ANION GAP SERPL CALCULATED.3IONS-SCNC: 12 MMOL/L (ref 3–16)
ANTI-XA UNFRAC HEPARIN: 0.13 IU/ML (ref 0.3–0.7)
ANTI-XA UNFRAC HEPARIN: 0.32 IU/ML (ref 0.3–0.7)
ANTI-XA UNFRAC HEPARIN: 0.32 IU/ML (ref 0.3–0.7)
BASOPHILS # BLD: 0.1 K/UL (ref 0–0.2)
BASOPHILS NFR BLD: 0.9 %
BUN SERPL-MCNC: 11 MG/DL (ref 7–20)
CALCIUM SERPL-MCNC: 9.2 MG/DL (ref 8.3–10.6)
CHLORIDE SERPL-SCNC: 101 MMOL/L (ref 99–110)
CO2 SERPL-SCNC: 25 MMOL/L (ref 21–32)
CREAT SERPL-MCNC: 0.6 MG/DL (ref 0.6–1.1)
DEPRECATED RDW RBC AUTO: 13.2 % (ref 12.4–15.4)
EOSINOPHIL # BLD: 0.3 K/UL (ref 0–0.6)
EOSINOPHIL NFR BLD: 2.6 %
GFR SERPLBLD CREATININE-BSD FMLA CKD-EPI: >60 ML/MIN/{1.73_M2}
GLUCOSE SERPL-MCNC: 120 MG/DL (ref 70–99)
HCT VFR BLD AUTO: 38.3 % (ref 36–48)
HGB BLD-MCNC: 13.4 G/DL (ref 12–16)
LYMPHOCYTES # BLD: 3.4 K/UL (ref 1–5.1)
LYMPHOCYTES NFR BLD: 28.5 %
MAGNESIUM SERPL-MCNC: 1.7 MG/DL (ref 1.8–2.4)
MCH RBC QN AUTO: 30.7 PG (ref 26–34)
MCHC RBC AUTO-ENTMCNC: 35 G/DL (ref 31–36)
MCV RBC AUTO: 87.7 FL (ref 80–100)
MONOCYTES # BLD: 0.9 K/UL (ref 0–1.3)
MONOCYTES NFR BLD: 7.2 %
NEUTROPHILS # BLD: 7.3 K/UL (ref 1.7–7.7)
NEUTROPHILS NFR BLD: 60.8 %
PLATELET # BLD AUTO: 267 K/UL (ref 135–450)
PMV BLD AUTO: 8.2 FL (ref 5–10.5)
POTASSIUM SERPL-SCNC: 3.5 MMOL/L (ref 3.5–5.1)
RBC # BLD AUTO: 4.36 M/UL (ref 4–5.2)
SODIUM SERPL-SCNC: 138 MMOL/L (ref 136–145)
WBC # BLD AUTO: 12.1 K/UL (ref 4–11)

## 2023-09-22 PROCEDURE — 2500000003 HC RX 250 WO HCPCS: Performed by: NURSE ANESTHETIST, CERTIFIED REGISTERED

## 2023-09-22 PROCEDURE — 2580000003 HC RX 258: Performed by: INTERNAL MEDICINE

## 2023-09-22 PROCEDURE — 6360000002 HC RX W HCPCS: Performed by: NURSE ANESTHETIST, CERTIFIED REGISTERED

## 2023-09-22 PROCEDURE — 3700000001 HC ADD 15 MINUTES (ANESTHESIA): Performed by: STUDENT IN AN ORGANIZED HEALTH CARE EDUCATION/TRAINING PROGRAM

## 2023-09-22 PROCEDURE — 2580000003 HC RX 258: Performed by: NURSE ANESTHETIST, CERTIFIED REGISTERED

## 2023-09-22 PROCEDURE — 85025 COMPLETE CBC W/AUTO DIFF WBC: CPT

## 2023-09-22 PROCEDURE — 6370000000 HC RX 637 (ALT 250 FOR IP): Performed by: HOSPITALIST

## 2023-09-22 PROCEDURE — 6370000000 HC RX 637 (ALT 250 FOR IP)

## 2023-09-22 PROCEDURE — 85347 COAGULATION TIME ACTIVATED: CPT

## 2023-09-22 PROCEDURE — 2580000003 HC RX 258: Performed by: STUDENT IN AN ORGANIZED HEALTH CARE EDUCATION/TRAINING PROGRAM

## 2023-09-22 PROCEDURE — 3600000004 HC SURGERY LEVEL 4 BASE: Performed by: STUDENT IN AN ORGANIZED HEALTH CARE EDUCATION/TRAINING PROGRAM

## 2023-09-22 PROCEDURE — A4217 STERILE WATER/SALINE, 500 ML: HCPCS | Performed by: STUDENT IN AN ORGANIZED HEALTH CARE EDUCATION/TRAINING PROGRAM

## 2023-09-22 PROCEDURE — 94761 N-INVAS EAR/PLS OXIMETRY MLT: CPT

## 2023-09-22 PROCEDURE — 36415 COLL VENOUS BLD VENIPUNCTURE: CPT

## 2023-09-22 PROCEDURE — 6360000002 HC RX W HCPCS: Performed by: STUDENT IN AN ORGANIZED HEALTH CARE EDUCATION/TRAINING PROGRAM

## 2023-09-22 PROCEDURE — APPNB30 APP NON BILLABLE TIME 0-30 MINS: Performed by: NURSE PRACTITIONER

## 2023-09-22 PROCEDURE — 7100000000 HC PACU RECOVERY - FIRST 15 MIN: Performed by: STUDENT IN AN ORGANIZED HEALTH CARE EDUCATION/TRAINING PROGRAM

## 2023-09-22 PROCEDURE — 6360000002 HC RX W HCPCS: Performed by: NURSE PRACTITIONER

## 2023-09-22 PROCEDURE — 2000000000 HC ICU R&B

## 2023-09-22 PROCEDURE — 2500000003 HC RX 250 WO HCPCS: Performed by: STUDENT IN AN ORGANIZED HEALTH CARE EDUCATION/TRAINING PROGRAM

## 2023-09-22 PROCEDURE — 6360000002 HC RX W HCPCS: Performed by: FAMILY MEDICINE

## 2023-09-22 PROCEDURE — 3600000014 HC SURGERY LEVEL 4 ADDTL 15MIN: Performed by: STUDENT IN AN ORGANIZED HEALTH CARE EDUCATION/TRAINING PROGRAM

## 2023-09-22 PROCEDURE — 3700000000 HC ANESTHESIA ATTENDED CARE: Performed by: STUDENT IN AN ORGANIZED HEALTH CARE EDUCATION/TRAINING PROGRAM

## 2023-09-22 PROCEDURE — 6370000000 HC RX 637 (ALT 250 FOR IP): Performed by: STUDENT IN AN ORGANIZED HEALTH CARE EDUCATION/TRAINING PROGRAM

## 2023-09-22 PROCEDURE — 03CJ0ZZ EXTIRPATION OF MATTER FROM LEFT COMMON CAROTID ARTERY, OPEN APPROACH: ICD-10-PCS | Performed by: STUDENT IN AN ORGANIZED HEALTH CARE EDUCATION/TRAINING PROGRAM

## 2023-09-22 PROCEDURE — 94640 AIRWAY INHALATION TREATMENT: CPT

## 2023-09-22 PROCEDURE — 2709999900 HC NON-CHARGEABLE SUPPLY: Performed by: STUDENT IN AN ORGANIZED HEALTH CARE EDUCATION/TRAINING PROGRAM

## 2023-09-22 PROCEDURE — 6370000000 HC RX 637 (ALT 250 FOR IP): Performed by: INTERNAL MEDICINE

## 2023-09-22 PROCEDURE — 7100000001 HC PACU RECOVERY - ADDTL 15 MIN: Performed by: STUDENT IN AN ORGANIZED HEALTH CARE EDUCATION/TRAINING PROGRAM

## 2023-09-22 PROCEDURE — 2720000010 HC SURG SUPPLY STERILE: Performed by: STUDENT IN AN ORGANIZED HEALTH CARE EDUCATION/TRAINING PROGRAM

## 2023-09-22 PROCEDURE — 85520 HEPARIN ASSAY: CPT

## 2023-09-22 PROCEDURE — 6360000002 HC RX W HCPCS: Performed by: HOSPITALIST

## 2023-09-22 PROCEDURE — 83735 ASSAY OF MAGNESIUM: CPT

## 2023-09-22 PROCEDURE — 80048 BASIC METABOLIC PNL TOTAL CA: CPT

## 2023-09-22 PROCEDURE — 88304 TISSUE EXAM BY PATHOLOGIST: CPT

## 2023-09-22 PROCEDURE — 6370000000 HC RX 637 (ALT 250 FOR IP): Performed by: NURSE PRACTITIONER

## 2023-09-22 RX ORDER — SODIUM CHLORIDE 0.9 % (FLUSH) 0.9 %
5-40 SYRINGE (ML) INJECTION EVERY 12 HOURS SCHEDULED
Status: DISCONTINUED | OUTPATIENT
Start: 2023-09-22 | End: 2023-09-22 | Stop reason: HOSPADM

## 2023-09-22 RX ORDER — ACETAMINOPHEN 325 MG/1
650 TABLET ORAL
Status: DISCONTINUED | OUTPATIENT
Start: 2023-09-22 | End: 2023-09-22 | Stop reason: HOSPADM

## 2023-09-22 RX ORDER — SODIUM CHLORIDE 9 MG/ML
INJECTION, SOLUTION INTRAVENOUS CONTINUOUS PRN
Status: DISCONTINUED | OUTPATIENT
Start: 2023-09-22 | End: 2023-09-22 | Stop reason: SDUPTHER

## 2023-09-22 RX ORDER — HEPARIN SODIUM 1000 [USP'U]/ML
INJECTION, SOLUTION INTRAVENOUS; SUBCUTANEOUS PRN
Status: DISCONTINUED | OUTPATIENT
Start: 2023-09-22 | End: 2023-09-22 | Stop reason: SDUPTHER

## 2023-09-22 RX ORDER — CEFAZOLIN SODIUM 1 G/3ML
INJECTION, POWDER, FOR SOLUTION INTRAMUSCULAR; INTRAVENOUS PRN
Status: DISCONTINUED | OUTPATIENT
Start: 2023-09-22 | End: 2023-09-22 | Stop reason: SDUPTHER

## 2023-09-22 RX ORDER — PROCHLORPERAZINE EDISYLATE 5 MG/ML
5 INJECTION INTRAMUSCULAR; INTRAVENOUS
Status: DISCONTINUED | OUTPATIENT
Start: 2023-09-22 | End: 2023-09-22 | Stop reason: HOSPADM

## 2023-09-22 RX ORDER — SODIUM CHLORIDE, SODIUM LACTATE, POTASSIUM CHLORIDE, CALCIUM CHLORIDE 600; 310; 30; 20 MG/100ML; MG/100ML; MG/100ML; MG/100ML
INJECTION, SOLUTION INTRAVENOUS CONTINUOUS PRN
Status: DISCONTINUED | OUTPATIENT
Start: 2023-09-22 | End: 2023-09-22

## 2023-09-22 RX ORDER — DEXAMETHASONE SODIUM PHOSPHATE 4 MG/ML
INJECTION, SOLUTION INTRA-ARTICULAR; INTRALESIONAL; INTRAMUSCULAR; INTRAVENOUS; SOFT TISSUE PRN
Status: DISCONTINUED | OUTPATIENT
Start: 2023-09-22 | End: 2023-09-22 | Stop reason: SDUPTHER

## 2023-09-22 RX ORDER — PROPOFOL 10 MG/ML
INJECTION, EMULSION INTRAVENOUS PRN
Status: DISCONTINUED | OUTPATIENT
Start: 2023-09-22 | End: 2023-09-22 | Stop reason: SDUPTHER

## 2023-09-22 RX ORDER — DIPHENHYDRAMINE HYDROCHLORIDE 50 MG/ML
12.5 INJECTION INTRAMUSCULAR; INTRAVENOUS
Status: DISCONTINUED | OUTPATIENT
Start: 2023-09-22 | End: 2023-09-22 | Stop reason: HOSPADM

## 2023-09-22 RX ORDER — FENTANYL CITRATE 50 UG/ML
25 INJECTION, SOLUTION INTRAMUSCULAR; INTRAVENOUS EVERY 5 MIN PRN
Status: DISCONTINUED | OUTPATIENT
Start: 2023-09-22 | End: 2023-09-22 | Stop reason: HOSPADM

## 2023-09-22 RX ORDER — GLYCOPYRROLATE 0.2 MG/ML
INJECTION INTRAMUSCULAR; INTRAVENOUS PRN
Status: DISCONTINUED | OUTPATIENT
Start: 2023-09-22 | End: 2023-09-22 | Stop reason: SDUPTHER

## 2023-09-22 RX ORDER — ONDANSETRON 2 MG/ML
INJECTION INTRAMUSCULAR; INTRAVENOUS PRN
Status: DISCONTINUED | OUTPATIENT
Start: 2023-09-22 | End: 2023-09-22 | Stop reason: SDUPTHER

## 2023-09-22 RX ORDER — LABETALOL HYDROCHLORIDE 5 MG/ML
10 INJECTION, SOLUTION INTRAVENOUS
Status: DISCONTINUED | OUTPATIENT
Start: 2023-09-22 | End: 2023-09-22 | Stop reason: HOSPADM

## 2023-09-22 RX ORDER — ONDANSETRON 2 MG/ML
4 INJECTION INTRAMUSCULAR; INTRAVENOUS
Status: DISCONTINUED | OUTPATIENT
Start: 2023-09-22 | End: 2023-09-22 | Stop reason: HOSPADM

## 2023-09-22 RX ORDER — LIDOCAINE HYDROCHLORIDE 20 MG/ML
INJECTION, SOLUTION INTRAVENOUS PRN
Status: DISCONTINUED | OUTPATIENT
Start: 2023-09-22 | End: 2023-09-22 | Stop reason: SDUPTHER

## 2023-09-22 RX ORDER — MIDAZOLAM HYDROCHLORIDE 1 MG/ML
INJECTION INTRAMUSCULAR; INTRAVENOUS PRN
Status: DISCONTINUED | OUTPATIENT
Start: 2023-09-22 | End: 2023-09-22 | Stop reason: SDUPTHER

## 2023-09-22 RX ORDER — LIDOCAINE HYDROCHLORIDE AND EPINEPHRINE 10; 10 MG/ML; UG/ML
INJECTION, SOLUTION INFILTRATION; PERINEURAL PRN
Status: DISCONTINUED | OUTPATIENT
Start: 2023-09-22 | End: 2023-09-22 | Stop reason: HOSPADM

## 2023-09-22 RX ORDER — MEPERIDINE HYDROCHLORIDE 25 MG/ML
12.5 INJECTION INTRAMUSCULAR; INTRAVENOUS; SUBCUTANEOUS EVERY 5 MIN PRN
Status: DISCONTINUED | OUTPATIENT
Start: 2023-09-22 | End: 2023-09-22 | Stop reason: HOSPADM

## 2023-09-22 RX ORDER — SODIUM CHLORIDE, SODIUM LACTATE, POTASSIUM CHLORIDE, CALCIUM CHLORIDE 600; 310; 30; 20 MG/100ML; MG/100ML; MG/100ML; MG/100ML
INJECTION, SOLUTION INTRAVENOUS CONTINUOUS PRN
Status: DISCONTINUED | OUTPATIENT
Start: 2023-09-22 | End: 2023-09-22 | Stop reason: SDUPTHER

## 2023-09-22 RX ORDER — DEXMEDETOMIDINE HYDROCHLORIDE 100 UG/ML
INJECTION, SOLUTION INTRAVENOUS PRN
Status: DISCONTINUED | OUTPATIENT
Start: 2023-09-22 | End: 2023-09-22 | Stop reason: SDUPTHER

## 2023-09-22 RX ORDER — FENTANYL CITRATE 50 UG/ML
INJECTION, SOLUTION INTRAMUSCULAR; INTRAVENOUS PRN
Status: DISCONTINUED | OUTPATIENT
Start: 2023-09-22 | End: 2023-09-22 | Stop reason: SDUPTHER

## 2023-09-22 RX ORDER — HYDROMORPHONE HYDROCHLORIDE 1 MG/ML
0.5 INJECTION, SOLUTION INTRAMUSCULAR; INTRAVENOUS; SUBCUTANEOUS EVERY 5 MIN PRN
Status: DISCONTINUED | OUTPATIENT
Start: 2023-09-22 | End: 2023-09-22 | Stop reason: HOSPADM

## 2023-09-22 RX ORDER — HYDRALAZINE HYDROCHLORIDE 20 MG/ML
10 INJECTION INTRAMUSCULAR; INTRAVENOUS
Status: DISCONTINUED | OUTPATIENT
Start: 2023-09-22 | End: 2023-09-23 | Stop reason: HOSPADM

## 2023-09-22 RX ORDER — LORAZEPAM 2 MG/ML
0.5 INJECTION INTRAMUSCULAR
Status: DISCONTINUED | OUTPATIENT
Start: 2023-09-22 | End: 2023-09-22 | Stop reason: HOSPADM

## 2023-09-22 RX ORDER — SODIUM CHLORIDE 9 MG/ML
INJECTION, SOLUTION INTRAVENOUS PRN
Status: DISCONTINUED | OUTPATIENT
Start: 2023-09-22 | End: 2023-09-22 | Stop reason: HOSPADM

## 2023-09-22 RX ORDER — ROCURONIUM BROMIDE 10 MG/ML
INJECTION, SOLUTION INTRAVENOUS PRN
Status: DISCONTINUED | OUTPATIENT
Start: 2023-09-22 | End: 2023-09-22 | Stop reason: SDUPTHER

## 2023-09-22 RX ORDER — PHENYLEPHRINE HYDROCHLORIDE 10 MG/ML
INJECTION INTRAVENOUS PRN
Status: DISCONTINUED | OUTPATIENT
Start: 2023-09-22 | End: 2023-09-22 | Stop reason: SDUPTHER

## 2023-09-22 RX ORDER — MAGNESIUM SULFATE IN WATER 40 MG/ML
2000 INJECTION, SOLUTION INTRAVENOUS ONCE
Status: COMPLETED | OUTPATIENT
Start: 2023-09-22 | End: 2023-09-22

## 2023-09-22 RX ORDER — IPRATROPIUM BROMIDE AND ALBUTEROL SULFATE 2.5; .5 MG/3ML; MG/3ML
1 SOLUTION RESPIRATORY (INHALATION)
Status: DISCONTINUED | OUTPATIENT
Start: 2023-09-22 | End: 2023-09-22 | Stop reason: HOSPADM

## 2023-09-22 RX ORDER — SODIUM CHLORIDE 0.9 % (FLUSH) 0.9 %
5-40 SYRINGE (ML) INJECTION PRN
Status: DISCONTINUED | OUTPATIENT
Start: 2023-09-22 | End: 2023-09-22 | Stop reason: HOSPADM

## 2023-09-22 RX ADMIN — FENTANYL CITRATE 100 MCG: 50 INJECTION, SOLUTION INTRAMUSCULAR; INTRAVENOUS at 14:05

## 2023-09-22 RX ADMIN — LIDOCAINE HYDROCHLORIDE 100 MG: 20 INJECTION, SOLUTION INTRAVENOUS at 14:05

## 2023-09-22 RX ADMIN — ROCURONIUM BROMIDE 15 MG: 10 INJECTION, SOLUTION INTRAVENOUS at 15:45

## 2023-09-22 RX ADMIN — ASPIRIN 81 MG: 81 TABLET, CHEWABLE ORAL at 09:00

## 2023-09-22 RX ADMIN — HEPARIN SODIUM 6000 UNITS: 1000 INJECTION INTRAVENOUS; SUBCUTANEOUS at 15:52

## 2023-09-22 RX ADMIN — ROCURONIUM BROMIDE 10 MG: 10 INJECTION, SOLUTION INTRAVENOUS at 16:44

## 2023-09-22 RX ADMIN — HEPARIN SODIUM 17 UNITS/KG/HR: 10000 INJECTION, SOLUTION INTRAVENOUS at 01:27

## 2023-09-22 RX ADMIN — PROPOFOL 50 MG: 10 INJECTION, EMULSION INTRAVENOUS at 14:07

## 2023-09-22 RX ADMIN — PHENYLEPHRINE HYDROCHLORIDE 100 MCG: 10 INJECTION INTRAVENOUS at 14:45

## 2023-09-22 RX ADMIN — SERTRALINE HYDROCHLORIDE 50 MG: 50 TABLET ORAL at 09:01

## 2023-09-22 RX ADMIN — SODIUM CHLORIDE: 9 INJECTION, SOLUTION INTRAVENOUS at 13:40

## 2023-09-22 RX ADMIN — SODIUM CHLORIDE, SODIUM LACTATE, POTASSIUM CHLORIDE, AND CALCIUM CHLORIDE: .6; .31; .03; .02 INJECTION, SOLUTION INTRAVENOUS at 14:17

## 2023-09-22 RX ADMIN — ROCURONIUM BROMIDE 50 MG: 10 INJECTION, SOLUTION INTRAVENOUS at 14:05

## 2023-09-22 RX ADMIN — METHOCARBAMOL TABLETS 1000 MG: 500 TABLET, COATED ORAL at 09:00

## 2023-09-22 RX ADMIN — MIDAZOLAM HYDROCHLORIDE 2 MG: 2 INJECTION, SOLUTION INTRAMUSCULAR; INTRAVENOUS at 13:41

## 2023-09-22 RX ADMIN — ARFORMOTEROL TARTRATE 15 MCG: 15 SOLUTION RESPIRATORY (INHALATION) at 07:46

## 2023-09-22 RX ADMIN — ONDANSETRON 4 MG: 2 INJECTION INTRAMUSCULAR; INTRAVENOUS at 14:15

## 2023-09-22 RX ADMIN — HEPARIN SODIUM 2000 UNITS: 1000 INJECTION INTRAVENOUS; SUBCUTANEOUS at 16:04

## 2023-09-22 RX ADMIN — ROCURONIUM BROMIDE 10 MG: 10 INJECTION, SOLUTION INTRAVENOUS at 16:10

## 2023-09-22 RX ADMIN — ATORVASTATIN CALCIUM 80 MG: 80 TABLET, FILM COATED ORAL at 20:48

## 2023-09-22 RX ADMIN — METHOCARBAMOL TABLETS 1000 MG: 500 TABLET, COATED ORAL at 20:48

## 2023-09-22 RX ADMIN — ROCURONIUM BROMIDE 10 MG: 10 INJECTION, SOLUTION INTRAVENOUS at 16:52

## 2023-09-22 RX ADMIN — OXYCODONE HYDROCHLORIDE 10 MG: 5 TABLET ORAL at 21:09

## 2023-09-22 RX ADMIN — HEPARIN SODIUM 3000 UNITS: 1000 INJECTION INTRAVENOUS; SUBCUTANEOUS at 17:10

## 2023-09-22 RX ADMIN — GLYCOPYRROLATE 0.2 MG: 0.2 INJECTION INTRAMUSCULAR; INTRAVENOUS at 14:16

## 2023-09-22 RX ADMIN — BUDESONIDE 250 MCG: 0.25 INHALANT RESPIRATORY (INHALATION) at 07:46

## 2023-09-22 RX ADMIN — PHENYLEPHRINE HYDROCHLORIDE 20 MCG/MIN: 50 INJECTION INTRAVENOUS at 14:17

## 2023-09-22 RX ADMIN — MAGNESIUM SULFATE HEPTAHYDRATE 2000 MG: 40 INJECTION, SOLUTION INTRAVENOUS at 11:29

## 2023-09-22 RX ADMIN — SODIUM CHLORIDE: 9 INJECTION, SOLUTION INTRAVENOUS at 11:28

## 2023-09-22 RX ADMIN — ROCURONIUM BROMIDE 25 MG: 10 INJECTION, SOLUTION INTRAVENOUS at 15:00

## 2023-09-22 RX ADMIN — CEFAZOLIN 2000 MG: 2 INJECTION, POWDER, FOR SOLUTION INTRAMUSCULAR; INTRAVENOUS at 21:30

## 2023-09-22 RX ADMIN — HYDROMORPHONE HYDROCHLORIDE 0.5 MG: 1 INJECTION, SOLUTION INTRAMUSCULAR; INTRAVENOUS; SUBCUTANEOUS at 19:27

## 2023-09-22 RX ADMIN — PHENYLEPHRINE HYDROCHLORIDE 100 MCG: 10 INJECTION INTRAVENOUS at 16:39

## 2023-09-22 RX ADMIN — PHENYLEPHRINE HYDROCHLORIDE 100 MCG: 10 INJECTION INTRAVENOUS at 17:34

## 2023-09-22 RX ADMIN — HEPARIN SODIUM 4000 UNITS: 1000 INJECTION INTRAVENOUS; SUBCUTANEOUS at 16:20

## 2023-09-22 RX ADMIN — DEXMEDETOMIDINE HYDROCHLORIDE 6 MCG: 100 INJECTION, SOLUTION INTRAVENOUS at 17:48

## 2023-09-22 RX ADMIN — PROPOFOL 150 MG: 10 INJECTION, EMULSION INTRAVENOUS at 14:05

## 2023-09-22 RX ADMIN — SODIUM CHLORIDE: 9 INJECTION, SOLUTION INTRAVENOUS at 16:00

## 2023-09-22 RX ADMIN — SODIUM CHLORIDE, PRESERVATIVE FREE 10 ML: 5 INJECTION INTRAVENOUS at 09:01

## 2023-09-22 RX ADMIN — DEXAMETHASONE SODIUM PHOSPHATE 4 MG: 4 INJECTION, SOLUTION INTRAMUSCULAR; INTRAVENOUS at 14:15

## 2023-09-22 RX ADMIN — ROCURONIUM BROMIDE 15 MG: 10 INJECTION, SOLUTION INTRAVENOUS at 17:38

## 2023-09-22 RX ADMIN — CEFAZOLIN SODIUM 2 G: 1 POWDER, FOR SOLUTION INTRAMUSCULAR; INTRAVENOUS at 14:16

## 2023-09-22 RX ADMIN — SUGAMMADEX 200 MG: 100 INJECTION, SOLUTION INTRAVENOUS at 18:16

## 2023-09-22 RX ADMIN — PHENYLEPHRINE HYDROCHLORIDE 100 MCG: 10 INJECTION INTRAVENOUS at 14:52

## 2023-09-22 RX ADMIN — HEPARIN SODIUM 2000 UNITS: 1000 INJECTION INTRAVENOUS; SUBCUTANEOUS at 16:52

## 2023-09-22 RX ADMIN — SODIUM CHLORIDE, PRESERVATIVE FREE 10 ML: 5 INJECTION INTRAVENOUS at 21:07

## 2023-09-22 ASSESSMENT — ENCOUNTER SYMPTOMS
NAUSEA: 0
DIARRHEA: 0
COUGH: 0
COLOR CHANGE: 0
SHORTNESS OF BREATH: 0
SORE THROAT: 0
ABDOMINAL PAIN: 0
VOMITING: 0
CONSTIPATION: 0
WHEEZING: 0
SINUS PAIN: 0
SINUS PRESSURE: 0
BACK PAIN: 0

## 2023-09-22 ASSESSMENT — PAIN DESCRIPTION - PAIN TYPE
TYPE: SURGICAL PAIN

## 2023-09-22 ASSESSMENT — PAIN - FUNCTIONAL ASSESSMENT
PAIN_FUNCTIONAL_ASSESSMENT: NONE - DENIES PAIN
PAIN_FUNCTIONAL_ASSESSMENT: ACTIVITIES ARE NOT PREVENTED

## 2023-09-22 ASSESSMENT — PAIN DESCRIPTION - ORIENTATION
ORIENTATION: LEFT

## 2023-09-22 ASSESSMENT — PAIN SCALES - GENERAL
PAINLEVEL_OUTOF10: 8
PAINLEVEL_OUTOF10: 5
PAINLEVEL_OUTOF10: 5
PAINLEVEL_OUTOF10: 8
PAINLEVEL_OUTOF10: 0
PAINLEVEL_OUTOF10: 9

## 2023-09-22 ASSESSMENT — PAIN DESCRIPTION - FREQUENCY
FREQUENCY: CONTINUOUS

## 2023-09-22 ASSESSMENT — PAIN DESCRIPTION - LOCATION
LOCATION: NECK

## 2023-09-22 ASSESSMENT — PAIN DESCRIPTION - DESCRIPTORS
DESCRIPTORS: THROBBING
DESCRIPTORS: THROBBING
DESCRIPTORS: SHARP

## 2023-09-22 ASSESSMENT — PAIN DESCRIPTION - ONSET: ONSET: ON-GOING

## 2023-09-22 NOTE — PROGRESS NOTES
noncontrast head CT report. No significant interval change from the previous exam obtained on 06/26/2023. Recommend surgical consultation. CT HEAD WO CONTRAST    Result Date: 9/17/2023  EXAMINATION: CT OF THE HEAD WITHOUT CONTRAST  9/17/2023 11:18 pm TECHNIQUE: CT of the head was performed without the administration of intravenous contrast. Automated exposure control, iterative reconstruction, and/or weight based adjustment of the mA/kV was utilized to reduce the radiation dose to as low as reasonably achievable. COMPARISON: June 26, 2023. HISTORY: ORDERING SYSTEM PROVIDED HISTORY: dizziness TECHNOLOGIST PROVIDED HISTORY: Reason for exam:->dizziness Has a \"code stroke\" or \"stroke alert\" been called? ->No Decision Support Exception - unselect if not a suspected or confirmed emergency medical condition->Emergency Medical Condition (MA) Is the patient pregnant?->No Reason for Exam: Dizziness Additional signs and symptoms: Dizziness - dizzy, pain shooting across shoulder blades, L eye \"blacking out\" though this has happened before. Has been going on for months; dizziness; left eye \"going black\"; pain between shoulder blades; broke her phone months ago and could not follow up with anyone because she did not have the phone numbers. Relevant Medical/Surgical History: Current every day smoker. Hx of anxiety, asthma, COPD, obesity, & tonsillectomy. No hx of any cancer. FINDINGS: BRAIN/VENTRICLES: There is no acute intracranial hemorrhage, mass effect or midline shift. No abnormal extra-axial fluid collection. The gray-white differentiation is maintained without evidence of an acute infarct. There is prominence of the ventricles and sulci due to global parenchymal volume loss. There are nonspecific areas of hypoattenuation within the periventricular and subcortical white matter, which likely represent chronic microvascular ischemic change.  ORBITS: The visualized portion of the orbits demonstrate no acute abnormality. SINUSES: The visualized paranasal sinuses and mastoid air cells demonstrate no acute abnormality. SOFT TISSUES/SKULL: No acute abnormality of the visualized skull or soft tissues. No acute intracranial abnormality. XR CHEST PORTABLE    Result Date: 9/17/2023  EXAMINATION: ONE XRAY VIEW OF THE CHEST 9/17/2023 11:20 pm COMPARISON: 06/26/2023 HISTORY: ORDERING SYSTEM PROVIDED HISTORY: sob TECHNOLOGIST PROVIDED HISTORY: Reason for exam:->sob Reason for Exam: Shortness of Breath Additional signs and symptoms: Dizziness - dizzy, pain shooting across shoulder blades, L eye \"blacking out\" though this has happened before. Has been going on for months; dizziness; left eye \"going black\"; pain between shoulder blades; broke her phone months ago and could not follow up with anyone because she did not have the phone numbers. Relevant Medical/Surgical History: Current every day smoker. Hx of anxiety, asthma, COPD, obesity, & tonsillectomy. No hx of any cancer. FINDINGS: The lungs are without acute focal process. There is no effusion or pneumothorax. The cardiomediastinal silhouette is stable. The osseous structures are stable. No acute process. Comment: Please note this report has been produced using speech recognition software and may contain errors related to that system including errors in grammar, punctuation, and spelling, as well as words and phrases that may be inappropriate. If there are any questions or concerns please feel free to contact the dictating provider for clarification.      Electronically signed by Monserrat Cosby MD on 9/22/2023 at 10:18 AM

## 2023-09-22 NOTE — PROGRESS NOTES
Physical Therapy/occupational Therapy    Attempted to see pt for PT/OT. Pt currently off floor. Will cont per POC.     Charlene Mohr[address], [place] [address]

## 2023-09-22 NOTE — PROGRESS NOTES
Pt. Oriented x4. VSS on RA. Pt. Managing pain per MAR. Pt. On heparin drip, no complications. Pt. Made NPO @midnight. Pt. Voiding well via BRP, SBA. No neuro changes overnight, NIH remains 1. All fall precautions in place and call light is within reach.

## 2023-09-22 NOTE — OP NOTE
Operative Note      Patient: Rubie Phalen  YOB: 1970  MRN: [id number]    Date of Procedure: 9/22/2023    Pre-Op Diagnosis Codes:     * Left carotid stenosis [I65.22]    Post-Op Diagnosis: Same       Procedure(s):  LEFT CAROTID ENDARTERECTOMY    Surgeon(s):  MD Susan Bradford MD    Assistant:   * No surgical staff found *    Anesthesia: General    Estimated Blood Loss (mL): less than 213     Complications: None    Specimens:   ID Type Source Tests Collected by Time Destination   A : CAROTID PLAQUE Tissue Tissue SURGICAL PATHOLOGY Parker Ortiz MD 9/22/2023 1649        Implants:  * No implants in log *      Drains:   Urinary Catheter 09/22/23 2 Way (Active)             Detailed Description of Procedure:         Date of operation:  09/22/23    Preoperative diagnosis:  Symptomatic left carotid artery stenosis    Postoperative diagnosis:  Same    Operation / Procedures Performed:  Left carotid endarterectomy  Use of neurophysiologic monitoring  Use of operative microscope and microsurgical technique    Surgeon:  Parker Ortiz MD, PhD    Assistant Surgeon:  Prema Rangel MD    Anesthesia:  General    Operative Indications: This is a 48y.o. year old female who presented dizziness and transient visual changes, and was found to have a small acute ischemic stroke in the left MCA hemisphere. She had known severe left ICA origin stenosis, diagnosed a few months ago, and favored asymptomatic. However it now is considered symptomatic and the source of her stroke, as other causes have been ruled out. The left ICA stenosis was favored to be occluded based on a recent MRA of the neck, but CTA neck was more consistent with severe stenosis. A catheter angiogram yesterday critical stenosis but a patent left ICA. I therefore recommended carotid endarterectomy for treatment of the symptomatic carotid stenosis.  I explained the alternatives, risks, and benefits of surgery, including into the internal carotid artery and proximally to the common carotid artery. The carotid plaque was dissected and removed with combination of micro-debakey forceps, a Penfield number 4, plaque- forceps, under frequent irrigation with heparinized saline. Special care was taken to remove all plaque and loose endothelial tissue in the distal internal carotid artery. Once the plaque was fully removed, the arteriotomy was closed. A double-armed 6-0 prolene was anchored at the distal end of the arteriotomy, and ran proximally to the carotid bifurcation. Next, a second 6-0 prolene was anchored at the proximal end of the arteriotomy, and ran to the carotid bifurcation. With about 4 mm of arteriotomy remaining to be closed, the clamped vessels were each temporarily unclamped, one at a time. The new blood was suctioned and the lumen was irrigated with heparinized saline. The final portion of the arteriotomy was closed and the two sutures were tied. Next, the clamps were removed, first the external clips, then the common carotid Tommy clamp, and finally the internal carotid clip was removed. No bleeding from the arteriotomy was present. The surgical field was explored for hemostasis, which was obtained with bipolar electrocautery and frequent irrigation. The platysma was closed with interrupted 3-0 vicryl sutures. The subcuticular layer was closed with a running 4-0 monocryl, and the skin was closed with dermabond. The patient was extubated and moved to the ICU for post operative monitoring in stable condition. Complications:  None immediate.      Counts:  Correct    Neuromonitoring:  SSEP and EEG remained at baseline throughout    Specimens:  Carotid plaque for permanent pathology    Estimated blood loss:  75 mL            Electronically signed by Debo Mccullough MD on 9/22/2023 at 6:17 PM

## 2023-09-22 NOTE — PROGRESS NOTES
Pt received from OR to PACU # 17 via bed. Post: Procedure(s):  LEFT CAROTID ENDARTERECTOMY     Report received from CRNA and OR RN. Per report pt given meds for hypotension during surgery. Incision on left neck with surgical glue clean, dry and intact. Ice pack applied to site. Respirations 12 . Pt is unresponsive with oral airway in place. Pulse ox 95% on simple mask at 6L. Pt warm touch. Attached to PACU monitoring system. Alarms and parameters set    No signs of pain or complain or nausea at this time.

## 2023-09-22 NOTE — ANESTHESIA PROCEDURE NOTES
Arterial Line:    An arterial line was placed using surface landmarks, in the OR for the following indication(s): continuous blood pressure monitoring and blood sampling needed. A 20 gauge (size), 1 and 3/8 inch (length), Angiocath (type) catheter was placed, Seldinger technique not used, into the right radial artery, secured by tape.   Anesthesia type: Local    Events:  patient tolerated procedure well with no complications and EBL 0CP.0/53/5291 1:55 PM9/22/2023 2:01 PM  Anesthesiologist: Rachelle Chadwick MD  Resident/CRNA: DEANNA Freedman CRNA  Performed: Anesthesiologist and Resident/CRNA   Preanesthetic Checklist  Completed: patient identified, IV checked, site marked, risks and benefits discussed, surgical/procedural consents, equipment checked, pre-op evaluation, timeout performed, anesthesia consent given, oxygen available and monitors applied/VS acknowledged

## 2023-09-22 NOTE — PLAN OF CARE
Problem: Discharge Planning  Goal: Discharge to home or other facility with appropriate resources  Outcome: Progressing  Pt involved in discharge planning. Barriers to discharge discussed with pt. Discharge learning needs identified. Discussed with pt any additional needed resources and transportation plans at discharge. Problem: Safety - Adult  Goal: Free from fall injury  9/22/2023 0742 by Casey Godwin RN  Outcome: Progressing  All fall precautions in place. Bed locked and in lowest position with alarm on. Overbed table and personal belonings within reach. Call light within reach and patient instructed to use call light for assistance. Non-skid socks on. Problem: Pain  Goal: Verbalizes/displays adequate comfort level or baseline comfort level  9/22/2023 0742 by Casey Godwin RN  Outcome: Progressing  Pt endorsing pain to right groin. Being treated with PRN pain medication, rest, and frequent repositioning with pillow support for comfort and pressure relief. Pt reports some relief from pain with above interventions.

## 2023-09-22 NOTE — PROGRESS NOTES
Current NIHSS 1    Nursing Core Measures for Stroke:   [x]   Education template documentation (STROKE/TIA). Please select only risk factors that are applicable to patient when selecting risk factors. [x]   Care Plan template documentation (Physiologic Instability - Neurosensory). Selecting this will add care plan rows to the flowsheet under the Neuro section of Head to Toe. [x]   Verified Swallow Screen completed prior to PO intake of food, drink, medications  [x]   VTE Prophylaxis: SCDs ordered/addressed; SCDs: N/A Heparin           (As a reminder, ASA, Plavix, and TPA/TNK are not VTE prophylaxis.)    Reviewed the Following Education with Patient and/or Family:   - Personalized risk factors for patient, along with changes, modifications that will help prevent stroke. - Signs and Symptoms of Stroke: (Facial droop, weakness/numbness especially on one side, speech difficulty, sudden confusion, sudden loss of vision, sudden severe headache, sudden loss of balance or having difficulty walking, syncope, or seizure)  - How to activate EMS (911)   - Importance of Follow Up Appointments at Discharge   - Importance of Compliance with Medications Prescribed at Discharge  - Available community resources and stroke advocacy groups if needed    Patient and/or family member: verbalized understanding. Stroke Education booklet given to patient/family (or verified, if given already), which reviews above information.  yes         Electronically signed by Nick Carias RN on 9/22/2023 at 5:11 AM

## 2023-09-22 NOTE — PROGRESS NOTES
Pts anti-Xa came back at 0.32. Per protocol no bolus no change pt continues to be on 17 units/kg/hr. This is pts second therapeutic lab. Anti-Xa will be checked again tomorrow morning.

## 2023-09-22 NOTE — ANESTHESIA POSTPROCEDURE EVALUATION
Department of Anesthesiology  Postprocedure Note    Patient: Gael Engle  MRN: [id number]  YOB: 1970  Date of evaluation: 9/22/2023      Procedure Summary     Date: 09/22/23 Room / Location: [address] [place] [address] / [address]    Anesthesia Start: 1339 Anesthesia Stop: 1838    Procedure: LEFT CAROTID ENDARTERECTOMY (Left: Neck) Diagnosis:       Left carotid stenosis      (Left carotid stenosis [I65.22])    Surgeons: Nyasia Bright MD Responsible Provider: Jeni Whittaker MD    Anesthesia Type: general ASA Status: 3          Anesthesia Type: No value filed.     Gabino Phase I: Gabino Score: 3    Gabino Phase II:        Anesthesia Post Evaluation    Patient location during evaluation: PACU  Patient participation: complete - patient participated  Level of consciousness: awake and alert  Airway patency: patent  Nausea & Vomiting: no nausea and no vomiting  Complications: no  Cardiovascular status: hemodynamically stable  Respiratory status: acceptable  Hydration status: euvolemic  Multimodal analgesia pain management approach  Pain management: satisfactory to patient

## 2023-09-23 VITALS
DIASTOLIC BLOOD PRESSURE: 78 MMHG | SYSTOLIC BLOOD PRESSURE: 96 MMHG | HEART RATE: 75 BPM | TEMPERATURE: 98.2 F | OXYGEN SATURATION: 92 % | RESPIRATION RATE: 22 BRPM | HEIGHT: 66 IN | BODY MASS INDEX: 37.63 KG/M2 | WEIGHT: 234.13 LBS

## 2023-09-23 PROCEDURE — 6370000000 HC RX 637 (ALT 250 FOR IP): Performed by: STUDENT IN AN ORGANIZED HEALTH CARE EDUCATION/TRAINING PROGRAM

## 2023-09-23 PROCEDURE — 97530 THERAPEUTIC ACTIVITIES: CPT

## 2023-09-23 PROCEDURE — 2580000003 HC RX 258: Performed by: NURSE PRACTITIONER

## 2023-09-23 PROCEDURE — 6360000002 HC RX W HCPCS: Performed by: STUDENT IN AN ORGANIZED HEALTH CARE EDUCATION/TRAINING PROGRAM

## 2023-09-23 PROCEDURE — 97168 OT RE-EVAL EST PLAN CARE: CPT

## 2023-09-23 PROCEDURE — 99231 SBSQ HOSP IP/OBS SF/LOW 25: CPT | Performed by: NURSE PRACTITIONER

## 2023-09-23 PROCEDURE — 97162 PT EVAL MOD COMPLEX 30 MIN: CPT

## 2023-09-23 PROCEDURE — 94640 AIRWAY INHALATION TREATMENT: CPT

## 2023-09-23 PROCEDURE — 2580000003 HC RX 258: Performed by: STUDENT IN AN ORGANIZED HEALTH CARE EDUCATION/TRAINING PROGRAM

## 2023-09-23 PROCEDURE — 97535 SELF CARE MNGMENT TRAINING: CPT

## 2023-09-23 PROCEDURE — 97116 GAIT TRAINING THERAPY: CPT

## 2023-09-23 RX ORDER — OXYCODONE HYDROCHLORIDE 5 MG/1
5 TABLET ORAL EVERY 8 HOURS PRN
Qty: 9 TABLET | Refills: 0 | Status: SHIPPED | OUTPATIENT
Start: 2023-09-23 | End: 2023-09-26

## 2023-09-23 RX ORDER — ASPIRIN 81 MG/1
81 TABLET, CHEWABLE ORAL DAILY
Qty: 90 TABLET | Refills: 0 | Status: SHIPPED | OUTPATIENT
Start: 2023-09-23

## 2023-09-23 RX ORDER — ATORVASTATIN CALCIUM 80 MG/1
80 TABLET, FILM COATED ORAL NIGHTLY
Qty: 90 TABLET | Refills: 0 | Status: SHIPPED | OUTPATIENT
Start: 2023-09-23

## 2023-09-23 RX ORDER — 0.9 % SODIUM CHLORIDE 0.9 %
500 INTRAVENOUS SOLUTION INTRAVENOUS ONCE
Status: COMPLETED | OUTPATIENT
Start: 2023-09-23 | End: 2023-09-23

## 2023-09-23 RX ORDER — METHOCARBAMOL 500 MG/1
500 TABLET, FILM COATED ORAL 4 TIMES DAILY PRN
Status: DISCONTINUED | OUTPATIENT
Start: 2023-09-23 | End: 2023-09-23 | Stop reason: HOSPADM

## 2023-09-23 RX ADMIN — SODIUM CHLORIDE 500 ML: 900 INJECTION, SOLUTION INTRAVENOUS at 00:28

## 2023-09-23 RX ADMIN — SERTRALINE HYDROCHLORIDE 50 MG: 50 TABLET ORAL at 08:51

## 2023-09-23 RX ADMIN — OXYCODONE HYDROCHLORIDE 10 MG: 5 TABLET ORAL at 15:29

## 2023-09-23 RX ADMIN — METHOCARBAMOL TABLETS 1000 MG: 500 TABLET, COATED ORAL at 07:29

## 2023-09-23 RX ADMIN — ASPIRIN 81 MG: 81 TABLET, CHEWABLE ORAL at 07:29

## 2023-09-23 RX ADMIN — SODIUM CHLORIDE, PRESERVATIVE FREE 10 ML: 5 INJECTION INTRAVENOUS at 07:30

## 2023-09-23 RX ADMIN — ARFORMOTEROL TARTRATE 15 MCG: 15 SOLUTION RESPIRATORY (INHALATION) at 09:06

## 2023-09-23 RX ADMIN — ACETAMINOPHEN 650 MG: 325 TABLET ORAL at 04:56

## 2023-09-23 RX ADMIN — TIOTROPIUM BROMIDE INHALATION SPRAY 2 PUFF: 3.12 SPRAY, METERED RESPIRATORY (INHALATION) at 09:11

## 2023-09-23 RX ADMIN — OXYCODONE HYDROCHLORIDE 10 MG: 5 TABLET ORAL at 02:21

## 2023-09-23 RX ADMIN — OXYCODONE HYDROCHLORIDE 10 MG: 5 TABLET ORAL at 07:29

## 2023-09-23 RX ADMIN — BUDESONIDE 250 MCG: 0.25 INHALANT RESPIRATORY (INHALATION) at 09:05

## 2023-09-23 RX ADMIN — CEFAZOLIN 2000 MG: 2 INJECTION, POWDER, FOR SOLUTION INTRAMUSCULAR; INTRAVENOUS at 06:06

## 2023-09-23 ASSESSMENT — PAIN SCALES - GENERAL
PAINLEVEL_OUTOF10: 3
PAINLEVEL_OUTOF10: 0
PAINLEVEL_OUTOF10: 8
PAINLEVEL_OUTOF10: 4
PAINLEVEL_OUTOF10: 8
PAINLEVEL_OUTOF10: 0
PAINLEVEL_OUTOF10: 3
PAINLEVEL_OUTOF10: 7
PAINLEVEL_OUTOF10: 8
PAINLEVEL_OUTOF10: 0
PAINLEVEL_OUTOF10: 5

## 2023-09-23 ASSESSMENT — PAIN DESCRIPTION - ORIENTATION
ORIENTATION: LEFT

## 2023-09-23 ASSESSMENT — PAIN DESCRIPTION - LOCATION
LOCATION: NECK

## 2023-09-23 ASSESSMENT — PAIN DESCRIPTION - DESCRIPTORS
DESCRIPTORS: DISCOMFORT
DESCRIPTORS: DISCOMFORT
DESCRIPTORS: NUMBNESS;DISCOMFORT

## 2023-09-23 ASSESSMENT — PAIN DESCRIPTION - PAIN TYPE: TYPE: SURGICAL PAIN

## 2023-09-23 NOTE — CARE COORDINATION
Patient discharge home with spouse. Rolling walker delivered to room. Family to transport.     Mitchel Miller, RN, BSN,    Ortho/Neuro   [phone]

## 2023-09-23 NOTE — PROGRESS NOTES
Patient working with PT/OT. Will call CM for walker order prior to discharge. Vascular lab called for patients ordered carotid duplex, they are not in the hospital on the weekends unless call in for stat order. Will notify provider.

## 2023-09-23 NOTE — DISCHARGE INSTRUCTIONS
Neurosurgery Instructions    Follow up with Dr. Yasmine Campo in 2 weeks for wound check  Limit lifting, pushing or pulling to 5 pounds or less  Walk as you are able. Start with short distances and work up to longer times standing and walking. Do not do any activity or exercise that makes you sweat or bend over so that your head is lower than your heart. Do not drive or operate power tools or machinery while taking narcotic pain medication until approved by your physician at your follow up appointment. Incision Care  Check your incision every day for redness or drainage. It is normal to have some bruising, swelling and tenderness around the incision  Notify Dr. Tank Peterson office of any new swelling, redness, or drainage from incision site. Notify office of any fevers greater than 101.5  Do not apply any of these products to the wound unless told to do so by your surgeon: Lotions, creams, ointment such as Neosporin, Alcohol, Hydrogen peroxide, Powder, Sunscreen   Your wound may be left open to the air. You may take a shower. When drying off, use a clean towel. Very gently pat the wound dry. Do not rub with a towel. It is also okay to let the wound air dry. Your wound was closed with special glue called Dermabond shower and clean the area as described above. Do not pick or remove the Dermabond. It will remain in place for about 10 days and then wear off by itself. Do not use any product except soap and water to clean incision. All other products can cause the glue to break down more quickly.

## 2023-09-23 NOTE — PROGRESS NOTES
outpatient as not easily done inpatient on weekends  -continue asa 81 mg  -counseled on smoking cessation  -BP remains low normal. Goal < 140 sbp.   -ok to discharge from neurosurgery perspective  -she will follow up in my office in 2 weeks for post op check    Meme Romero MD, PhD  Endovascular Neurosurgery  [address]  [phone] (office direct line)  [phone] ([facility])

## 2023-09-23 NOTE — DISCHARGE SUMMARY
Discharge Summary    Name:  Albert Claude /Age/Sex: 1970  (48 y.o. female)   MRN & CSN:  [id number] & [id number] Admission Date/Time: 2023  3:40 AM   Attending:  No att. providers found Discharging Physician: Fouzia Huang MD     Hospital Course:   Albert Claude is a 48 y.o.  female who presented to the hospital with a complaint of worsening intermittent dizziness with associated peripheral left eye vision loss. MRI of the neck demonstrated occluded left internal carotid artery just distal to the origin and a right internal carotid artery bulb segment of 50% diameter stenosis. MRI of the brain demonstrated left frontal cortex punctate acute infarction and steno-occlusive disease of the intracranial left internal carotid artery. Patient was taken to the OR and underwent left CEA on 2023. Patient cleared for discharge by neurosurgery with plans of follow-up in outpatient clinic and a repeat carotid Doppler ultrasound. Patient discharged in stable condition. 1.  Left frontal cortex punctate acute infarction  2. Occluded left internal carotid artery, s/p CEA on 2023  3. Peripheral left eye vision loss  4. Dizziness  5. Tobacco abuse disorder, smokes 10 cigarettes/day  6. COPD, no exacerbation  7. Restless leg syndrome  8. Hyperlipidemia  9. Depression  10. Morbid obesity, 235 pounds, BMI 37.7    The patient expressed appropriate understanding of and agreement with the discharge recommendations, medications, and plan. Consults this admission:  IP CONSULT TO NEUROSURGERY  IP CONSULT TO NEUROLOGY    Discharge Instruction:   Follow up appointments:  Follow-up with neurosurgery in 1-2 weeks    Diet:  regular diet   Activity: activity as tolerated  Disposition: Discharged to:   [x]Home, []C, []SNF, []Acute Rehab, []Hospice   Condition on discharge: Stable    Discharge Medications:        Medication List        START taking these medications      oxyCODONE 5 MG immediate

## 2023-09-23 NOTE — PROGRESS NOTES
Patient neuro unchanged, up to bathroom and walks well with walker. Standard safety precautions in place.

## 2023-09-23 NOTE — PROGRESS NOTES
4 Eyes Admission Assessment     I agree as the admission nurse that 2 RN's have performed a thorough Head to Toe Skin Assessment on the patient. ALL assessment sites listed below have been assessed on admission. Areas assessed by both nurses: yes  [x]   Head, Face, and Ears   [x]   Shoulders, Back, and Chest  [x]   Arms, Elbows, and Hands   [x]   Coccyx, Sacrum, and Ischium  [x]   Legs, Feet, and Heels        Does the Patient have Skin Breakdown?   No         Urban Prevention initiated:  Yes   Wound Care Orders initiated:  Yes      [facility] nurse consulted for Pressure Injury (Stage 3,4, Unstageable, DTI, NWPT, and Complex wounds) or Urban score 18 or lower:  No      Nurse 1 eSignature: Electronically signed by Mookie Willis RN on 9/22/23 at 10:56 PM EDT    **SHARE this note so that the co-signing nurse is able to place an eSignature**    Nurse 2 eSignature: Electronically signed by Melba Odonnell RN on 9/23/23 at 2:57 AM EDT

## 2023-09-23 NOTE — PROGRESS NOTES
Hospitalist Progress Note      Name:  Gilberto Bravo /Age/Sex: 1970  (48 y.o. female)   MRN & CSN:  [id number] & [id number] Admission Date/Time: 2023  3:40 AM   Location:  [facility] PCP: [address] [place] [address] Day: 6    DISPOSITION: Home or rehab unit    IMPRESSION:  Gilberto Bravo is a 48 y.o.  female who presented to the hospital with a complaint of worsening intermittent dizziness with associated peripheral left eye vision loss. MRI of the neck demonstrated occluded left internal carotid artery just distal to the origin and a right internal carotid artery bulb segment of 50% diameter stenosis. MRI of the brain demonstrated left frontal cortex punctate acute infarction and steno-occlusive disease of the intracranial left internal carotid artery. Patient was taken to the OR and underwent left CEA on 2023.    1.  Left frontal cortex punctate acute infarction  2. Occluded left internal carotid artery, s/p CEA on 2023  3. Peripheral left eye vision loss  4. Dizziness  5. Tobacco abuse disorder, smokes 10 cigarettes/day  6. COPD, no exacerbation  7. Restless leg syndrome  8. Hyperlipidemia  9. Depression  10. Morbid obesity, 235 pounds, BMI 37.7    PLAN:  Aspirin 81 mg/Lipitor 80 mg daily  Zoloft 50 mg daily  Spiriva 2 puffs daily  Pulmicort twice daily  Robaxin 500 mg 4 times daily PRN    Diet ADULT DIET; Regular   DVT Prophylaxis [] Lovenox, [x]  Heparin, [] SCDs, [] Ambulation   GI Prophylaxis [] PPI,  [] H2 Blocker,  [] Carafate,  [] Diet/Tube Feeds   Code Status Full Code   Disposition    MDM [] Low, [] Moderate,[]  High     Chief complaint/Interval History/ROS     Chief Complaint: Dizziness, vision loss    INTERVAL HISTORY:    No acute events overnight. Patient denies chest pain or abdominal pain. Patient continues to complain difficulty with vision on the periphery of the left eye. She is stated that her central vision is okay.   Patient is able to

## 2023-09-23 NOTE — PROGRESS NOTES
TIA  Follows Commands: Within Functional Limits  Subjective  Subjective: pt supine in bed and agreeable to PT, eager to get home         Social/Functional History  Social/Functional History  Lives With:  (2 sons and grandson (3 yo))  Type of Home: Apartment (3rd floor)  Home Layout: One level  Home Access: Stairs to enter with rails  Entrance Stairs - Number of Steps: 2 LUISA, 21 steps to apt level  Bathroom Shower/Tub: Tub/Shower unit  Bathroom Toilet: Standard (sink next to toilet)  Bathroom Equipment: None  Home Equipment: None  Has the patient had two or more falls in the past year or any fall with injury in the past year?: No (Several near falls)  ADL Assistance: Independent  Homemaking Assistance: Needs assistance (kids and boyfriend assist)  Ambulation Assistance: Independent  Transfer Assistance: Independent  Active : No  Occupation: Unemployed  Vision/Hearing  Vision  Vision: Impaired  Vision Exceptions: Wears glasses at all times  Hearing  Hearing: Within functional limits    Cognition   Orientation  Overall Orientation Status: Within Normal Limits  Cognition  Overall Cognitive Status: WFL     Objective   Pulse: 70  Heart Rate Source: Monitor  BP: (!) 102/59  BP Location: Left upper arm  BP Method: Automatic  Patient Position: Semi fowlers  MAP (Calculated): 73  Respirations: 17  SpO2: 93 %  O2 Device: None (Room air)                 Strength RLE  Strength RLE: WFL  Strength LLE  Strength LLE: WFL        Balance  Sitting: Intact  Standing: Intact (Supervision - static)  Gait  Overall Level of Assistance: Stand-by assistance (using RW; occasional VCs for safe walker negotiation (ie - when getting into bed, story walker aside and takes a few steps - pt is steady, but lacks peripheral field - educated to be cautious doing this to avoid tripping on RW.  Pt verbalized understandin)  Bed mobility  Supine to Sit: Independent  Sit to Supine: Independent  Scooting: Independent  Transfers  Sit to Stand: Stand by

## 2023-09-23 NOTE — PLAN OF CARE
Problem: Discharge Planning  Goal: Discharge to home or other facility with appropriate resources  Outcome: Progressing  Flowsheets (Taken 9/22/2023 2100)  Discharge to home or other facility with appropriate resources: Identify barriers to discharge with patient and caregiver     Problem: Safety - Adult  Goal: Free from fall injury  Outcome: Progressing  Flowsheets (Taken 9/22/2023 2253)  Free From Fall Injury: Instruct family/caregiver on patient safety     Problem: ABCDS Injury Assessment  Goal: Absence of physical injury  Outcome: Progressing  Flowsheets (Taken 9/22/2023 2253)  Absence of Physical Injury: Implement safety measures based on patient assessment     Problem: Neurosensory - Adult  Goal: Achieves stable or improved neurological status  Outcome: Progressing  Flowsheets (Taken 9/22/2023 2100)  Achieves stable or improved neurological status: Assess for and report changes in neurological status     Problem: Neurosensory - Adult  Goal: Achieves maximal functionality and self care  Outcome: Progressing  Flowsheets (Taken 9/22/2023 2100)  Achieves maximal functionality and self care: Monitor swallowing and airway patency with patient fatigue and changes in neurological status     Problem: Respiratory - Adult  Goal: Achieves optimal ventilation and oxygenation  Outcome: Progressing  Flowsheets (Taken 9/22/2023 2100)  Achieves optimal ventilation and oxygenation: Assess for changes in respiratory status     Problem: Cardiovascular - Adult  Goal: Maintains optimal cardiac output and hemodynamic stability  Outcome: Progressing  Flowsheets (Taken 9/22/2023 2100)  Maintains optimal cardiac output and hemodynamic stability: Monitor blood pressure and heart rate     Problem: Pain  Goal: Verbalizes/displays adequate comfort level or baseline comfort level  Outcome: Progressing  Flowsheets (Taken 9/22/2023 2100)  Verbalizes/displays adequate comfort level or baseline comfort level: Encourage patient to monitor pain

## 2023-09-23 NOTE — PROGRESS NOTES
Patient arrived in her room from the PACU. She is alert and oriented x4. Her vital signs are stable. She has a right radial arterial line in with systolic blood pressures in the 100's. At baseline, she has poor peripheral vision bilaterally but her EOM's are intact and her available visual fields are intact. Her pupils are equal and briskly reactive. She has no focal deficits on exam.     Her left neck incision is well approximated with no drainage. No signs of post hemorrhage. She is breathing without difficulty. Plan for pain management, blood pressure management (<114 systolic) and hourly neuro exams tonight. If systolic blood pressures persistently rise above 140, will favor starting low dose nicardipine drip to avoid large drops in blood pressure that can come with IV pushes.      [address] [facility],[address]

## 2023-09-23 NOTE — PROGRESS NOTES
Discharge paperwork discussed with patient and significant other at bedside. Walker delivered to patient.

## 2023-09-25 ENCOUNTER — TELEPHONE (OUTPATIENT)
Dept: INTERNAL MEDICINE CLINIC | Age: 53
End: 2023-09-25

## 2023-09-25 NOTE — TELEPHONE ENCOUNTER
Care Transitions Initial Follow Up Call    Outreach made within 2 business days of discharge: Yes    Patient: Becka Chamberlain Patient : 1970   MRN: [id number]  Reason for Admission: There are no discharge diagnoses documented for the most recent discharge. Discharge Date: 23       Spoke with: N/A    Discharge department/facility: [address] [place] [address] Patient Contact:  Was patient able to fill all prescriptions: Unable to get a hold of patient  Was patient instructed to bring all medications to the follow-up visit: No: Unable to get a hold of patient  Is patient taking all medications as directed in the discharge summary? Unable to get a hold of patient  Does patient understand their discharge instructions: No: Unable to get a hold of patient  Does patient have questions or concerns that need addressed prior to 7-14 day follow up office visit: no    Scheduled appointment with PCP within 7-14 days  Unable to get a hold of patient  Follow Up  No future appointments.     Henry Mercer MA

## 2023-10-13 ENCOUNTER — OFFICE VISIT (OUTPATIENT)
Dept: INTERNAL MEDICINE CLINIC | Age: 53
End: 2023-10-13
Payer: MEDICAID

## 2023-10-13 VITALS
DIASTOLIC BLOOD PRESSURE: 88 MMHG | OXYGEN SATURATION: 98 % | SYSTOLIC BLOOD PRESSURE: 132 MMHG | HEART RATE: 78 BPM | WEIGHT: 235 LBS | BODY MASS INDEX: 37.93 KG/M2

## 2023-10-13 DIAGNOSIS — F41.9 ANXIETY: ICD-10-CM

## 2023-10-13 DIAGNOSIS — J44.9 CHRONIC OBSTRUCTIVE PULMONARY DISEASE, UNSPECIFIED COPD TYPE (HCC): ICD-10-CM

## 2023-10-13 DIAGNOSIS — F32.A DEPRESSION, UNSPECIFIED DEPRESSION TYPE: ICD-10-CM

## 2023-10-13 DIAGNOSIS — Z72.0 TOBACCO ABUSE: ICD-10-CM

## 2023-10-13 DIAGNOSIS — Z09 HOSPITAL DISCHARGE FOLLOW-UP: Primary | ICD-10-CM

## 2023-10-13 DIAGNOSIS — I65.23 BILATERAL CAROTID ARTERY STENOSIS: ICD-10-CM

## 2023-10-13 DIAGNOSIS — M17.12 OSTEOARTHRITIS OF LEFT KNEE, UNSPECIFIED OSTEOARTHRITIS TYPE: ICD-10-CM

## 2023-10-13 PROCEDURE — 1111F DSCHRG MED/CURRENT MED MERGE: CPT | Performed by: NURSE PRACTITIONER

## 2023-10-13 PROCEDURE — 99214 OFFICE O/P EST MOD 30 MIN: CPT | Performed by: NURSE PRACTITIONER

## 2023-10-13 RX ORDER — FLUTICASONE FUROATE, UMECLIDINIUM BROMIDE AND VILANTEROL TRIFENATATE 100; 62.5; 25 UG/1; UG/1; UG/1
1 POWDER RESPIRATORY (INHALATION) DAILY
Qty: 3 EACH | Refills: 0 | COMMUNITY
Start: 2023-10-13

## 2023-10-13 RX ORDER — SERTRALINE HYDROCHLORIDE 100 MG/1
100 TABLET, FILM COATED ORAL DAILY
Qty: 90 TABLET | Refills: 0 | Status: SHIPPED | OUTPATIENT
Start: 2023-10-13

## 2023-10-13 ASSESSMENT — ENCOUNTER SYMPTOMS
COUGH: 1
WHEEZING: 1
SHORTNESS OF BREATH: 1

## 2023-10-13 NOTE — PROGRESS NOTES
normal.      Breath sounds: Normal breath sounds. No wheezing. Skin:     General: Skin is warm and dry. Comments: Left carotid surgical incision    Neurological:      Mental Status: She is alert and oriented to person, place, and time.    Psychiatric:         Behavior: Behavior normal.         An electronic signature was used to authenticate this note.  --DEANNA Buchanan
no

## 2023-10-16 ENCOUNTER — HOSPITAL ENCOUNTER (EMERGENCY)
Age: 53
Discharge: ANOTHER HOSPITAL | End: 2023-10-16
Attending: EMERGENCY MEDICINE
Payer: MEDICAID

## 2023-10-16 ENCOUNTER — APPOINTMENT (OUTPATIENT)
Dept: CT IMAGING | Age: 53
End: 2023-10-16
Payer: MEDICAID

## 2023-10-16 ENCOUNTER — TELEPHONE (OUTPATIENT)
Dept: OTHER | Facility: CLINIC | Age: 53
End: 2023-10-16

## 2023-10-16 ENCOUNTER — APPOINTMENT (OUTPATIENT)
Dept: MRI IMAGING | Age: 53
DRG: 058 | End: 2023-10-16
Attending: SINGLE SPECIALTY
Payer: MEDICAID

## 2023-10-16 ENCOUNTER — ANESTHESIA EVENT (OUTPATIENT)
Dept: INTERVENTIONAL RADIOLOGY/VASCULAR | Age: 53
End: 2023-10-16
Payer: MEDICAID

## 2023-10-16 ENCOUNTER — HOSPITAL ENCOUNTER (INPATIENT)
Age: 53
LOS: 2 days | Discharge: HOME | DRG: 058 | End: 2023-10-18
Attending: SINGLE SPECIALTY | Admitting: INTERNAL MEDICINE
Payer: MEDICAID

## 2023-10-16 ENCOUNTER — APPOINTMENT (OUTPATIENT)
Dept: INTERVENTIONAL RADIOLOGY/VASCULAR | Age: 53
DRG: 058 | End: 2023-10-16
Attending: SINGLE SPECIALTY
Payer: MEDICAID

## 2023-10-16 ENCOUNTER — ANESTHESIA (OUTPATIENT)
Dept: INTERVENTIONAL RADIOLOGY/VASCULAR | Age: 53
End: 2023-10-16
Payer: MEDICAID

## 2023-10-16 VITALS
OXYGEN SATURATION: 97 % | DIASTOLIC BLOOD PRESSURE: 93 MMHG | SYSTOLIC BLOOD PRESSURE: 145 MMHG | RESPIRATION RATE: 15 BRPM | HEART RATE: 72 BPM | TEMPERATURE: 98.6 F

## 2023-10-16 DIAGNOSIS — I63.9 CEREBROVASCULAR ACCIDENT (CVA), UNSPECIFIED MECHANISM (HCC): ICD-10-CM

## 2023-10-16 DIAGNOSIS — R29.898 LEFT ARM WEAKNESS: ICD-10-CM

## 2023-10-16 DIAGNOSIS — R29.898 LEFT LEG WEAKNESS: ICD-10-CM

## 2023-10-16 DIAGNOSIS — R47.01 APHASIA: Primary | ICD-10-CM

## 2023-10-16 DIAGNOSIS — I65.23 BILATERAL CAROTID ARTERY STENOSIS: ICD-10-CM

## 2023-10-16 LAB
AMPHETAMINES UR QL SCN>1000 NG/ML: ABNORMAL
ANION GAP SERPL CALCULATED.3IONS-SCNC: 14 MMOL/L (ref 3–16)
APTT BLD: 27.9 SEC (ref 22.7–35.9)
BARBITURATES UR QL SCN>200 NG/ML: ABNORMAL
BASOPHILS # BLD: 0.1 K/UL (ref 0–0.2)
BASOPHILS NFR BLD: 0.8 %
BENZODIAZ UR QL SCN>200 NG/ML: ABNORMAL
BILIRUB UR QL STRIP.AUTO: NEGATIVE
BUN SERPL-MCNC: 10 MG/DL (ref 7–20)
CALCIUM SERPL-MCNC: 9.7 MG/DL (ref 8.3–10.6)
CANNABINOIDS UR QL SCN>50 NG/ML: ABNORMAL
CHLORIDE SERPL-SCNC: 100 MMOL/L (ref 99–110)
CLARITY UR: CLEAR
CO2 SERPL-SCNC: 26 MMOL/L (ref 21–32)
COCAINE UR QL SCN: ABNORMAL
COLOR UR: YELLOW
CREAT SERPL-MCNC: 0.6 MG/DL (ref 0.6–1.1)
DEPRECATED RDW RBC AUTO: 13.4 % (ref 12.4–15.4)
DRUG SCREEN COMMENT UR-IMP: ABNORMAL
EOSINOPHIL # BLD: 0.3 K/UL (ref 0–0.6)
EOSINOPHIL NFR BLD: 2.5 %
EPI CELLS #/AREA URNS HPF: NORMAL /HPF (ref 0–5)
FENTANYL SCREEN, URINE: ABNORMAL
GFR SERPLBLD CREATININE-BSD FMLA CKD-EPI: >60 ML/MIN/{1.73_M2}
GLUCOSE BLD-MCNC: 109 MG/DL (ref 70–99)
GLUCOSE SERPL-MCNC: 108 MG/DL (ref 70–99)
GLUCOSE UR STRIP.AUTO-MCNC: NEGATIVE MG/DL
HCT VFR BLD AUTO: 41.2 % (ref 36–48)
HGB BLD-MCNC: 14.2 G/DL (ref 12–16)
HGB UR QL STRIP.AUTO: ABNORMAL
INR PPP: 0.94 (ref 0.84–1.16)
KETONES UR STRIP.AUTO-MCNC: NEGATIVE MG/DL
LEUKOCYTE ESTERASE UR QL STRIP.AUTO: NEGATIVE
LYMPHOCYTES # BLD: 3.6 K/UL (ref 1–5.1)
LYMPHOCYTES NFR BLD: 32.9 %
MAGNESIUM SERPL-MCNC: 1.9 MG/DL (ref 1.8–2.4)
MCH RBC QN AUTO: 30.6 PG (ref 26–34)
MCHC RBC AUTO-ENTMCNC: 34.4 G/DL (ref 31–36)
MCV RBC AUTO: 89 FL (ref 80–100)
METHADONE UR QL SCN>300 NG/ML: ABNORMAL
MONOCYTES # BLD: 0.6 K/UL (ref 0–1.3)
MONOCYTES NFR BLD: 5.6 %
NEUTROPHILS # BLD: 6.3 K/UL (ref 1.7–7.7)
NEUTROPHILS NFR BLD: 58.2 %
NITRITE UR QL STRIP.AUTO: NEGATIVE
OPIATES UR QL SCN>300 NG/ML: ABNORMAL
OXYCODONE UR QL SCN: POSITIVE
PCP UR QL SCN>25 NG/ML: ABNORMAL
PERFORMED ON: ABNORMAL
PH UR STRIP.AUTO: 7 [PH] (ref 5–8)
PH UR STRIP: 7 [PH]
PLATELET # BLD AUTO: 349 K/UL (ref 135–450)
PMV BLD AUTO: 7.8 FL (ref 5–10.5)
POTASSIUM SERPL-SCNC: 3.5 MMOL/L (ref 3.5–5.1)
PROT UR STRIP.AUTO-MCNC: NEGATIVE MG/DL
PROTHROMBIN TIME: 12.6 SEC (ref 11.5–14.8)
RBC # BLD AUTO: 4.63 M/UL (ref 4–5.2)
RBC #/AREA URNS HPF: NORMAL /HPF (ref 0–4)
SODIUM SERPL-SCNC: 140 MMOL/L (ref 136–145)
SP GR UR STRIP.AUTO: 1.01 (ref 1–1.03)
TROPONIN, HIGH SENSITIVITY: <6 NG/L (ref 0–14)
UA COMPLETE W REFLEX CULTURE PNL UR: ABNORMAL
UA DIPSTICK W REFLEX MICRO PNL UR: YES
URN SPEC COLLECT METH UR: ABNORMAL
UROBILINOGEN UR STRIP-ACNC: 0.2 E.U./DL
WBC # BLD AUTO: 10.9 K/UL (ref 4–11)
WBC #/AREA URNS HPF: NORMAL /HPF (ref 0–5)

## 2023-10-16 PROCEDURE — 6360000004 HC RX CONTRAST MEDICATION: Performed by: EMERGENCY MEDICINE

## 2023-10-16 PROCEDURE — C1887 CATHETER, GUIDING: HCPCS

## 2023-10-16 PROCEDURE — 6360000004 HC RX CONTRAST MEDICATION: Performed by: STUDENT IN AN ORGANIZED HEALTH CARE EDUCATION/TRAINING PROGRAM

## 2023-10-16 PROCEDURE — 99291 CRITICAL CARE FIRST HOUR: CPT

## 2023-10-16 PROCEDURE — 36226 PLACE CATH VERTEBRAL ART: CPT

## 2023-10-16 PROCEDURE — 6360000002 HC RX W HCPCS: Performed by: ANESTHESIOLOGY

## 2023-10-16 PROCEDURE — 85610 PROTHROMBIN TIME: CPT

## 2023-10-16 PROCEDURE — 36223 PLACE CATH CAROTID/INOM ART: CPT

## 2023-10-16 PROCEDURE — 2709999900 HC NON-CHARGEABLE SUPPLY

## 2023-10-16 PROCEDURE — 70450 CT HEAD/BRAIN W/O DYE: CPT

## 2023-10-16 PROCEDURE — 83735 ASSAY OF MAGNESIUM: CPT

## 2023-10-16 PROCEDURE — 80048 BASIC METABOLIC PNL TOTAL CA: CPT

## 2023-10-16 PROCEDURE — 51798 US URINE CAPACITY MEASURE: CPT

## 2023-10-16 PROCEDURE — 3700000000 HC ANESTHESIA ATTENDED CARE

## 2023-10-16 PROCEDURE — C1769 GUIDE WIRE: HCPCS

## 2023-10-16 PROCEDURE — 70498 CT ANGIOGRAPHY NECK: CPT

## 2023-10-16 PROCEDURE — B31R1ZZ FLUOROSCOPY OF INTRACRANIAL ARTERIES USING LOW OSMOLAR CONTRAST: ICD-10-PCS | Performed by: STUDENT IN AN ORGANIZED HEALTH CARE EDUCATION/TRAINING PROGRAM

## 2023-10-16 PROCEDURE — 84484 ASSAY OF TROPONIN QUANT: CPT

## 2023-10-16 PROCEDURE — 85730 THROMBOPLASTIN TIME PARTIAL: CPT

## 2023-10-16 PROCEDURE — 2720000010 HC SURG SUPPLY STERILE

## 2023-10-16 PROCEDURE — 99285 EMERGENCY DEPT VISIT HI MDM: CPT

## 2023-10-16 PROCEDURE — C1894 INTRO/SHEATH, NON-LASER: HCPCS

## 2023-10-16 PROCEDURE — 2000000000 HC ICU R&B

## 2023-10-16 PROCEDURE — 81001 URINALYSIS AUTO W/SCOPE: CPT

## 2023-10-16 PROCEDURE — 85025 COMPLETE CBC W/AUTO DIFF WBC: CPT

## 2023-10-16 PROCEDURE — 3700000001 HC ADD 15 MINUTES (ANESTHESIA)

## 2023-10-16 PROCEDURE — C1760 CLOSURE DEV, VASC: HCPCS

## 2023-10-16 PROCEDURE — 70551 MRI BRAIN STEM W/O DYE: CPT

## 2023-10-16 PROCEDURE — 93005 ELECTROCARDIOGRAM TRACING: CPT | Performed by: EMERGENCY MEDICINE

## 2023-10-16 PROCEDURE — B41F1ZZ FLUOROSCOPY OF RIGHT LOWER EXTREMITY ARTERIES USING LOW OSMOLAR CONTRAST: ICD-10-PCS | Performed by: STUDENT IN AN ORGANIZED HEALTH CARE EDUCATION/TRAINING PROGRAM

## 2023-10-16 PROCEDURE — 51701 INSERT BLADDER CATHETER: CPT

## 2023-10-16 PROCEDURE — 36415 COLL VENOUS BLD VENIPUNCTURE: CPT

## 2023-10-16 PROCEDURE — 6360000002 HC RX W HCPCS

## 2023-10-16 PROCEDURE — 80307 DRUG TEST PRSMV CHEM ANLYZR: CPT

## 2023-10-16 RX ORDER — ONDANSETRON 4 MG/1
4 TABLET, ORALLY DISINTEGRATING ORAL EVERY 8 HOURS PRN
Status: DISCONTINUED | OUTPATIENT
Start: 2023-10-16 | End: 2023-10-18 | Stop reason: HOSPADM

## 2023-10-16 RX ORDER — SODIUM CHLORIDE 0.9 % (FLUSH) 0.9 %
5-40 SYRINGE (ML) INJECTION EVERY 12 HOURS SCHEDULED
Status: DISCONTINUED | OUTPATIENT
Start: 2023-10-16 | End: 2023-10-18 | Stop reason: HOSPADM

## 2023-10-16 RX ORDER — FENTANYL CITRATE 50 UG/ML
INJECTION, SOLUTION INTRAMUSCULAR; INTRAVENOUS PRN
Status: DISCONTINUED | OUTPATIENT
Start: 2023-10-16 | End: 2023-10-16 | Stop reason: SDUPTHER

## 2023-10-16 RX ORDER — POLYETHYLENE GLYCOL 3350 17 G/17G
17 POWDER, FOR SOLUTION ORAL DAILY PRN
Status: DISCONTINUED | OUTPATIENT
Start: 2023-10-16 | End: 2023-10-18 | Stop reason: HOSPADM

## 2023-10-16 RX ORDER — ONDANSETRON 2 MG/ML
4 INJECTION INTRAMUSCULAR; INTRAVENOUS EVERY 6 HOURS PRN
Status: DISCONTINUED | OUTPATIENT
Start: 2023-10-16 | End: 2023-10-18 | Stop reason: HOSPADM

## 2023-10-16 RX ORDER — SODIUM CHLORIDE 9 MG/ML
INJECTION, SOLUTION INTRAVENOUS PRN
Status: DISCONTINUED | OUTPATIENT
Start: 2023-10-16 | End: 2023-10-18 | Stop reason: HOSPADM

## 2023-10-16 RX ORDER — DIAZEPAM 5 MG/ML
5 INJECTION, SOLUTION INTRAMUSCULAR; INTRAVENOUS
Status: COMPLETED | OUTPATIENT
Start: 2023-10-16 | End: 2023-10-16

## 2023-10-16 RX ORDER — SERTRALINE HYDROCHLORIDE 100 MG/1
100 TABLET, FILM COATED ORAL DAILY
Status: DISCONTINUED | OUTPATIENT
Start: 2023-10-17 | End: 2023-10-18 | Stop reason: HOSPADM

## 2023-10-16 RX ORDER — ALBUTEROL SULFATE 2.5 MG/3ML
2.5 SOLUTION RESPIRATORY (INHALATION) EVERY 4 HOURS PRN
Status: DISCONTINUED | OUTPATIENT
Start: 2023-10-16 | End: 2023-10-18 | Stop reason: HOSPADM

## 2023-10-16 RX ORDER — SODIUM CHLORIDE 0.9 % (FLUSH) 0.9 %
5-40 SYRINGE (ML) INJECTION PRN
Status: DISCONTINUED | OUTPATIENT
Start: 2023-10-16 | End: 2023-10-18 | Stop reason: HOSPADM

## 2023-10-16 RX ORDER — ONDANSETRON 4 MG/1
4 TABLET, ORALLY DISINTEGRATING ORAL EVERY 8 HOURS PRN
Status: DISCONTINUED | OUTPATIENT
Start: 2023-10-16 | End: 2023-10-16

## 2023-10-16 RX ORDER — ACETAMINOPHEN 325 MG/1
650 TABLET ORAL EVERY 4 HOURS PRN
Status: DISCONTINUED | OUTPATIENT
Start: 2023-10-16 | End: 2023-10-18 | Stop reason: HOSPADM

## 2023-10-16 RX ORDER — ATORVASTATIN CALCIUM 80 MG/1
80 TABLET, FILM COATED ORAL NIGHTLY
Status: DISCONTINUED | OUTPATIENT
Start: 2023-10-16 | End: 2023-10-18 | Stop reason: HOSPADM

## 2023-10-16 RX ORDER — ASPIRIN 300 MG/1
300 SUPPOSITORY RECTAL DAILY
Status: DISCONTINUED | OUTPATIENT
Start: 2023-10-17 | End: 2023-10-18 | Stop reason: HOSPADM

## 2023-10-16 RX ORDER — LABETALOL HYDROCHLORIDE 5 MG/ML
10 INJECTION, SOLUTION INTRAVENOUS EVERY 10 MIN PRN
Status: DISCONTINUED | OUTPATIENT
Start: 2023-10-16 | End: 2023-10-18 | Stop reason: HOSPADM

## 2023-10-16 RX ORDER — ONDANSETRON 2 MG/ML
4 INJECTION INTRAMUSCULAR; INTRAVENOUS EVERY 6 HOURS PRN
Status: DISCONTINUED | OUTPATIENT
Start: 2023-10-16 | End: 2023-10-16

## 2023-10-16 RX ORDER — ASPIRIN 81 MG/1
81 TABLET, CHEWABLE ORAL DAILY
Status: DISCONTINUED | OUTPATIENT
Start: 2023-10-17 | End: 2023-10-18 | Stop reason: HOSPADM

## 2023-10-16 RX ORDER — PROPOFOL 10 MG/ML
INJECTION, EMULSION INTRAVENOUS PRN
Status: DISCONTINUED | OUTPATIENT
Start: 2023-10-16 | End: 2023-10-16 | Stop reason: SDUPTHER

## 2023-10-16 RX ORDER — IODIXANOL 270 MG/ML
100 INJECTION, SOLUTION INTRAVASCULAR ONCE
Status: COMPLETED | OUTPATIENT
Start: 2023-10-16 | End: 2023-10-16

## 2023-10-16 RX ADMIN — IOMEPROL INJECTION 75 ML: 714 INJECTION, SOLUTION INTRAVASCULAR at 18:45

## 2023-10-16 RX ADMIN — IODIXANOL 90 ML: 270 INJECTION, SOLUTION INTRAVASCULAR at 21:25

## 2023-10-16 RX ADMIN — IOMEPROL INJECTION 75 ML: 714 INJECTION, SOLUTION INTRAVASCULAR at 19:04

## 2023-10-16 RX ADMIN — PROPOFOL 30 MG: 10 INJECTION, EMULSION INTRAVENOUS at 20:39

## 2023-10-16 RX ADMIN — FENTANYL CITRATE 50 MCG: 50 INJECTION INTRAMUSCULAR; INTRAVENOUS at 20:46

## 2023-10-16 RX ADMIN — DIAZEPAM 5 MG: 5 INJECTION, SOLUTION INTRAMUSCULAR; INTRAVENOUS at 22:46

## 2023-10-16 RX ADMIN — FENTANYL CITRATE 50 MCG: 50 INJECTION INTRAMUSCULAR; INTRAVENOUS at 21:27

## 2023-10-16 RX ADMIN — PROPOFOL 50 MCG/KG/MIN: 10 INJECTION, EMULSION INTRAVENOUS at 20:41

## 2023-10-16 NOTE — ED NOTES
Patient reports some decreased sensation to left arm and leg. Patient now able to lift limbs easier, but still shows significant drift in left arm and leg. DO notified on updated NIHSS.      Radha Ag RN  10/16/23 1912

## 2023-10-16 NOTE — ED NOTES
DO at bedside for assessment at this time. Per DO, no stroke activation required at this time due to patient being outside of TPA window.      Jenna Pepper RN  10/16/23 2132       Jenna Pepper RN  10/16/23 5871

## 2023-10-16 NOTE — CONSULTS
Telemedicine Consult Note   Stroke Team                Patient Name: Gilberto Bravo (37 y.o. female)  MRN: [id number]  : 1970  Admission Date: 10/16/2023   Current Date: 10/16/23       STROKE TIMELINE  Last Known Well: 10/16/23 @ 1130  ED arrival time: 10/16/23 @ 1817  Stroke consult time: 10/16/23 @ 1859  TNK bolus time: N/A  DTN (minutes): N/A   Most Recent NIH Stroke Scale: 6      Chief Complaint     Acute onset difficulty speaking, L hemiparesis/L hemisensory deficit    History of Present Illness   Gilberto Bravo is a right-handed 48 y.o. female with L frontal stroke related to L ICA stenosis s/p L CEA (2023), tobacco use d/o, COPD, HLD, [facility]LS [facility] acute onset difficulty speaking, L hemiparesis/L hemisensory deficit. Last known well (LKW) on 10/16/23 at 1130 when she had onset of difficulty speaking, L hemiparesis/L hemisensory deficit. Initial /100, , NIHSS 6 for L nasolabial fold flattening, L arm drift (hits bed), L leg drift (hits bed), L hemisensory deficits in face/arm/leg (can sense being touched). CT head negative for hemorrhage. CTA head and neck notable for interval occlusion of L ICA. Not a candidate for thrombolytic due to presentation outside the treatment window (>4.5 since symptom onset). Discussed case with Neurosurgery/Neurointervention who recommend transfer for digital subtraction angiography - concern for R M2 occlusion from interventional team.    Patient reports she is ambulatory with a walker at baseline. She was able to ambulate into the Emergency Department with her walker on arrival despite deficits.       Past Medical History:   Diagnosis Date    Anxiety     Asthma     COPD (chronic obstructive pulmonary disease) ([address])     Depression     Endometriosis     Knee pain       Past Surgical History:   Procedure Laterality Date    CAROTID ENDARTERECTOMY Left 2023    LEFT CAROTID ENDARTERECTOMY performed by [facility]

## 2023-10-16 NOTE — ED PROVIDER NOTES
Emergency Department Encounter  Location: [address] [place] [address]    Patient: Alexandre Avila  MRN: [id number]  : 1970  Date of evaluation: 10/16/2023  ED Provider: Gilberto Maxwell MD    Alexandre Avila care was taken over at sign out, patient initial seen by Dr. Billie Robison , please see prior provider's note for details of history, exam and completed studies.     I have reviewed and interpreted all of the currently available lab results and diagnostics from this visit:  Results for orders placed or performed during the hospital encounter of 10/16/23   CBC with Auto Differential   Result Value Ref Range    WBC 10.9 4.0 - 11.0 K/uL    RBC 4.63 4.00 - 5.20 M/uL    Hemoglobin 14.2 12.0 - 16.0 g/dL    Hematocrit 41.2 36.0 - 48.0 %    MCV 89.0 80.0 - 100.0 fL    MCH 30.6 26.0 - 34.0 pg    MCHC 34.4 31.0 - 36.0 g/dL    RDW 13.4 12.4 - 15.4 %    Platelets 968 969 - 375 K/uL    MPV 7.8 5.0 - 10.5 fL    Neutrophils % 58.2 %    Lymphocytes % 32.9 %    Monocytes % 5.6 %    Eosinophils % 2.5 %    Basophils % 0.8 %    Neutrophils Absolute 6.3 1.7 - 7.7 K/uL    Lymphocytes Absolute 3.6 1.0 - 5.1 K/uL    Monocytes Absolute 0.6 0.0 - 1.3 K/uL    Eosinophils Absolute 0.3 0.0 - 0.6 K/uL    Basophils Absolute 0.1 0.0 - 0.2 K/uL   Basic Metabolic Panel   Result Value Ref Range    Sodium 140 136 - 145 mmol/L    Potassium 3.5 3.5 - 5.1 mmol/L    Chloride 100 99 - 110 mmol/L    CO2 26 21 - 32 mmol/L    Anion Gap 14 3 - 16    Glucose 108 (H) 70 - 99 mg/dL    BUN 10 7 - 20 mg/dL    Creatinine 0.6 0.6 - 1.1 mg/dL    Est, Glom Filt Rate >60 >60    Calcium 9.7 8.3 - 10.6 mg/dL   Troponin   Result Value Ref Range    Troponin, High Sensitivity <6 0 - 14 ng/L   Protime-INR   Result Value Ref Range    Protime 12.6 11.5 - 14.8 sec    INR 0.94 0.84 - 1.16   APTT   Result Value Ref Range    aPTT 27.9 22.7 - 35.9 sec   Magnesium   Result Value Ref Range    Magnesium 1.90 1.80 - 2.40 mg/dL   POCT Glucose   Result Value Ref Range
findings as documented in the HPI  [address]    has a past medical history of Anxiety, Asthma, COPD (chronic obstructive pulmonary disease) ([address]), Depression, Endometriosis, and Knee pain.    Past Medical History:   Diagnosis Date    Anxiety     Asthma     COPD (chronic obstructive pulmonary disease) ([address])     Depression     Endometriosis     Knee pain        SURGICALHISTORY       Past Surgical History:   Procedure Laterality Date    CAROTID ENDARTERECTOMY Left 9/22/2023    LEFT CAROTID ENDARTERECTOMY performed by Constance Lebron MD at [address] (CERVIX STATUS UNKNOWN)      INCONTINENCE SURGERY  2017    KNEE ARTHROSCOPY Right     2012    TONSILLECTOMY       CURRENT MEDICATIONS       Discharge Medication List as of 10/16/2023  8:46 PM        CONTINUE these medications which have NOT CHANGED    Details   sertraline (ZOLOFT) 100 MG tablet Take 1 tablet by mouth daily, Disp-90 tablet, R-0Normal      Handicap Placard MISC Starting Fri 10/13/2023, Disp-1 each, R-0, Print5 years      fluticasone-umeclidin-vilant (TRELEGY ELLIPTA) 100-62.5-25 MCG/ACT AEPB inhaler Inhale 1 puff into the lungs daily, Disp-3 each, R-0Sample      aspirin 81 MG chewable tablet Take 1 tablet by mouth daily, Disp-90 tablet, R-0Normal      atorvastatin (LIPITOR) 80 MG tablet Take 1 tablet by mouth nightly, Disp-90 tablet, R-0Normal      albuterol sulfate HFA (VENTOLIN HFA) 108 (90 Base) MCG/ACT inhaler Inhale 2 puffs into the lungs 4 times daily as needed for Wheezing or Shortness of Breath, Disp-54 g, R-1Normal            ALLERGIES     Tomato  FAMILY HISTORY       Family History   Problem Relation Age of Onset    Other Mother 52        heart failure    Diabetes Mother     Heart Disease Mother     Alcohol Abuse Father     Diabetes Brother     Other Brother         drug addiction    Other Brother         drug addiction    Other Brother         drug

## 2023-10-16 NOTE — ED TRIAGE NOTES
Patient presents to ED complaining of stuttering/difficulty speaking which started around 1130 this morning. Patient states she had a scan with contrast this morning around 1100 and states she had some memory problems at that time. Recent left carotid clean out. Patient presents with stuttering speech and left arm and leg weakness. Patient resting on bed, respirations even and easy at this time. Patient appears anxious. DO notified of patient presentation.

## 2023-10-16 NOTE — ED NOTES
[facility] ETA 2000 to transport patient to [facility] cath lab.      Maria Elena Romero RN  10/16/23 1955

## 2023-10-17 ENCOUNTER — APPOINTMENT (OUTPATIENT)
Dept: CT IMAGING | Age: 53
DRG: 058 | End: 2023-10-17
Attending: SINGLE SPECIALTY
Payer: MEDICAID

## 2023-10-17 ENCOUNTER — APPOINTMENT (OUTPATIENT)
Dept: GENERAL RADIOLOGY | Age: 53
DRG: 058 | End: 2023-10-17
Attending: SINGLE SPECIALTY
Payer: MEDICAID

## 2023-10-17 PROBLEM — R29.898 LEFT ARM WEAKNESS: Status: ACTIVE | Noted: 2023-10-17

## 2023-10-17 PROBLEM — F80.81 STUTTERING: Status: ACTIVE | Noted: 2023-10-17

## 2023-10-17 PROBLEM — I65.22 LEFT CAROTID ARTERY OCCLUSION: Status: ACTIVE | Noted: 2023-10-16

## 2023-10-17 LAB
ANION GAP SERPL CALCULATED.3IONS-SCNC: 9 MMOL/L (ref 3–16)
BUN SERPL-MCNC: 10 MG/DL (ref 7–20)
CALCIUM SERPL-MCNC: 9.5 MG/DL (ref 8.3–10.6)
CHLORIDE SERPL-SCNC: 105 MMOL/L (ref 99–110)
CO2 SERPL-SCNC: 28 MMOL/L (ref 21–32)
CREAT SERPL-MCNC: 0.6 MG/DL (ref 0.6–1.1)
DEPRECATED RDW RBC AUTO: 13.5 % (ref 12.4–15.4)
EKG ATRIAL RATE: 78 BPM
EKG DIAGNOSIS: NORMAL
EKG P AXIS: 41 DEGREES
EKG P-R INTERVAL: 168 MS
EKG Q-T INTERVAL: 418 MS
EKG QRS DURATION: 96 MS
EKG QTC CALCULATION (BAZETT): 476 MS
EKG R AXIS: -21 DEGREES
EKG T AXIS: 50 DEGREES
EKG VENTRICULAR RATE: 78 BPM
GFR SERPLBLD CREATININE-BSD FMLA CKD-EPI: >60 ML/MIN/{1.73_M2}
GLUCOSE SERPL-MCNC: 101 MG/DL (ref 70–99)
HCT VFR BLD AUTO: 39.8 % (ref 36–48)
HGB BLD-MCNC: 13.9 G/DL (ref 12–16)
MCH RBC QN AUTO: 30.6 PG (ref 26–34)
MCHC RBC AUTO-ENTMCNC: 35 G/DL (ref 31–36)
MCV RBC AUTO: 87.3 FL (ref 80–100)
PLATELET # BLD AUTO: 283 K/UL (ref 135–450)
PMV BLD AUTO: 8.1 FL (ref 5–10.5)
POTASSIUM SERPL-SCNC: 3.9 MMOL/L (ref 3.5–5.1)
RBC # BLD AUTO: 4.56 M/UL (ref 4–5.2)
SODIUM SERPL-SCNC: 142 MMOL/L (ref 136–145)
WBC # BLD AUTO: 8.9 K/UL (ref 4–11)

## 2023-10-17 PROCEDURE — 36415 COLL VENOUS BLD VENIPUNCTURE: CPT

## 2023-10-17 PROCEDURE — 97130 THER IVNTJ EA ADDL 15 MIN: CPT

## 2023-10-17 PROCEDURE — 80048 BASIC METABOLIC PNL TOTAL CA: CPT

## 2023-10-17 PROCEDURE — 2580000003 HC RX 258

## 2023-10-17 PROCEDURE — 94640 AIRWAY INHALATION TREATMENT: CPT

## 2023-10-17 PROCEDURE — 92610 EVALUATE SWALLOWING FUNCTION: CPT

## 2023-10-17 PROCEDURE — 6370000000 HC RX 637 (ALT 250 FOR IP)

## 2023-10-17 PROCEDURE — 97129 THER IVNTJ 1ST 15 MIN: CPT

## 2023-10-17 PROCEDURE — 74018 RADEX ABDOMEN 1 VIEW: CPT

## 2023-10-17 PROCEDURE — 2000000000 HC ICU R&B

## 2023-10-17 PROCEDURE — 97166 OT EVAL MOD COMPLEX 45 MIN: CPT

## 2023-10-17 PROCEDURE — 6360000002 HC RX W HCPCS

## 2023-10-17 PROCEDURE — 99222 1ST HOSP IP/OBS MODERATE 55: CPT | Performed by: INTERNAL MEDICINE

## 2023-10-17 PROCEDURE — 85027 COMPLETE CBC AUTOMATED: CPT

## 2023-10-17 PROCEDURE — 6360000004 HC RX CONTRAST MEDICATION

## 2023-10-17 PROCEDURE — 92523 SPEECH SOUND LANG COMPREHEN: CPT

## 2023-10-17 PROCEDURE — 97530 THERAPEUTIC ACTIVITIES: CPT

## 2023-10-17 PROCEDURE — 2580000003 HC RX 258: Performed by: STUDENT IN AN ORGANIZED HEALTH CARE EDUCATION/TRAINING PROGRAM

## 2023-10-17 PROCEDURE — 92526 ORAL FUNCTION THERAPY: CPT

## 2023-10-17 PROCEDURE — 0042T CT BRAIN PERFUSION: CPT

## 2023-10-17 PROCEDURE — 97116 GAIT TRAINING THERAPY: CPT

## 2023-10-17 PROCEDURE — 93010 ELECTROCARDIOGRAM REPORT: CPT | Performed by: INTERNAL MEDICINE

## 2023-10-17 PROCEDURE — 83036 HEMOGLOBIN GLYCOSYLATED A1C: CPT

## 2023-10-17 PROCEDURE — 2700000000 HC OXYGEN THERAPY PER DAY

## 2023-10-17 PROCEDURE — 97162 PT EVAL MOD COMPLEX 30 MIN: CPT

## 2023-10-17 PROCEDURE — 94761 N-INVAS EAR/PLS OXIMETRY MLT: CPT

## 2023-10-17 PROCEDURE — 6370000000 HC RX 637 (ALT 250 FOR IP): Performed by: STUDENT IN AN ORGANIZED HEALTH CARE EDUCATION/TRAINING PROGRAM

## 2023-10-17 RX ORDER — BUDESONIDE 0.5 MG/2ML
0.5 INHALANT ORAL
Status: DISCONTINUED | OUTPATIENT
Start: 2023-10-17 | End: 2023-10-18 | Stop reason: HOSPADM

## 2023-10-17 RX ORDER — ARFORMOTEROL TARTRATE 15 UG/2ML
15 SOLUTION RESPIRATORY (INHALATION)
Status: DISCONTINUED | OUTPATIENT
Start: 2023-10-17 | End: 2023-10-18 | Stop reason: HOSPADM

## 2023-10-17 RX ADMIN — ARFORMOTEROL TARTRATE 15 MCG: 15 SOLUTION RESPIRATORY (INHALATION) at 21:18

## 2023-10-17 RX ADMIN — TIOTROPIUM BROMIDE INHALATION SPRAY 2 PUFF: 3.12 SPRAY, METERED RESPIRATORY (INHALATION) at 09:07

## 2023-10-17 RX ADMIN — BUDESONIDE INHALATION 500 MCG: 0.5 SUSPENSION RESPIRATORY (INHALATION) at 21:18

## 2023-10-17 RX ADMIN — ATORVASTATIN CALCIUM 80 MG: 80 TABLET, FILM COATED ORAL at 20:43

## 2023-10-17 RX ADMIN — IOPAMIDOL 45 ML: 755 INJECTION, SOLUTION INTRAVENOUS at 11:15

## 2023-10-17 RX ADMIN — ARFORMOTEROL TARTRATE 15 MCG: 15 SOLUTION RESPIRATORY (INHALATION) at 09:02

## 2023-10-17 RX ADMIN — SODIUM CHLORIDE, PRESERVATIVE FREE 10 ML: 5 INJECTION INTRAVENOUS at 20:44

## 2023-10-17 RX ADMIN — ATORVASTATIN CALCIUM 80 MG: 80 TABLET, FILM COATED ORAL at 00:44

## 2023-10-17 RX ADMIN — ASPIRIN 81 MG: 81 TABLET, CHEWABLE ORAL at 09:29

## 2023-10-17 RX ADMIN — ACETAMINOPHEN 650 MG: 325 TABLET ORAL at 08:15

## 2023-10-17 RX ADMIN — SODIUM CHLORIDE, PRESERVATIVE FREE 10 ML: 5 INJECTION INTRAVENOUS at 00:44

## 2023-10-17 RX ADMIN — BUDESONIDE INHALATION 500 MCG: 0.5 SUSPENSION RESPIRATORY (INHALATION) at 09:02

## 2023-10-17 RX ADMIN — SERTRALINE HYDROCHLORIDE 100 MG: 100 TABLET, FILM COATED ORAL at 09:29

## 2023-10-17 RX ADMIN — Medication 10 ML: at 09:30

## 2023-10-17 ASSESSMENT — PAIN SCALES - GENERAL
PAINLEVEL_OUTOF10: 0
PAINLEVEL_OUTOF10: 8
PAINLEVEL_OUTOF10: 0
PAINLEVEL_OUTOF10: 0

## 2023-10-17 ASSESSMENT — ENCOUNTER SYMPTOMS
VOMITING: 0
TROUBLE SWALLOWING: 0
WHEEZING: 0
ABDOMINAL PAIN: 0
CHEST TIGHTNESS: 0
EYE PAIN: 0
DIARRHEA: 0
NAUSEA: 0
ABDOMINAL DISTENTION: 0
BACK PAIN: 1
CHOKING: 0
SORE THROAT: 0
PHOTOPHOBIA: 0
COUGH: 0
CONSTIPATION: 0
SHORTNESS OF BREATH: 0

## 2023-10-17 ASSESSMENT — PAIN DESCRIPTION - LOCATION: LOCATION: BACK

## 2023-10-17 ASSESSMENT — PAIN DESCRIPTION - ORIENTATION: ORIENTATION: MID

## 2023-10-17 ASSESSMENT — PAIN - FUNCTIONAL ASSESSMENT: PAIN_FUNCTIONAL_ASSESSMENT: ACTIVITIES ARE NOT PREVENTED

## 2023-10-17 ASSESSMENT — PAIN DESCRIPTION - DESCRIPTORS: DESCRIPTORS: DISCOMFORT;ACHING

## 2023-10-17 ASSESSMENT — PAIN DESCRIPTION - PAIN TYPE: TYPE: ACUTE PAIN

## 2023-10-17 NOTE — H&P
ICU H&P      Hospital Day: 1  ICU Day: 1                                                         Code:Prior  Admit Date: 10/16/2023  PCP: DEANNA Macdonald                                  CC: Aphaisa, left-sided hemiparesis    HISTORY OF PRESENT ILLNESS:   Eliane Goncalves is a 47 yo female with a medical history of left frontal CVA (09/18/2023) s/p left carotid endarterectomy on 09/22/2023 with no residual deficits, COPD, depression, current every day smoker who presented to [facility] ED with aphasia and left-sided hemiparesis. Patient states she noticed she was having some difficulty remembering where to go for her medical appointment this morning, was confused about where she was, last known well around 11 AM. She reports when she went home after her appointment she began having difficulties with her speech, stuttering and having some difficulty with word finding. Her stuttering progressively worsened, and she began to feel weak in her left upper and lower extremity around 4 PM, after which her family convinced her to go to the ER. She denies feeling any dizziness, lightheadedness, syncope, falls, headache, chest pain, palpitations, swelling in extremities, shortness of breath, nausea, vomiting, abdominal pain, constipation, diarrhea, difficulty urinating, hematuria. She was recently hospitalized in September with intermittent dizziness with peripheral vision lost in her left eye and was found to have an acute left frontal cortex infarct on MRI and an occluded left ICA. She underwent left CEA on 09/22/2023. Discharged on 09/23/2023 with NSG follow-up and repeat carotid doppler scheduled. ED Course: Upon admission patient was afebrile Temp 97.8F, HR 72 bpm, RR 13/min, hypertensive with /100, SpO2 97% on RA. Code stroke was called and  neuro stroke team called. Labs showed BMP significant for sodium 140, potassium 3.5, creatinine 0.6, magnesium 1.9, glucose 108.   CBC [place] Day#: na  NGT Day#:  na                                             ETT Day#: na    No results for input(s): \"PHART\", \"WXE5LAA\", \"PO2ART\" in the last 72 hours. DATA:       Labs:  CBC:   Recent Labs     10/16/23  1825   WBC 10.9   HGB 14.2   HCT 41.2          BMP:   Recent Labs     10/16/23  1825      K 3.5      CO2 26   BUN 10   CREATININE 0.6   GLUCOSE 108*     LFT's: No results for input(s): \"AST\", \"ALT\", \"ALB\", \"BILITOT\", \"ALKPHOS\" in the last 72 hours. Troponin: No results for input(s): \"TROPONINI\" in the last 72 hours. BNP:No results for input(s): \"BNP\" in the last 72 hours. ABGs: No results for input(s): \"PHART\", \"AXW2XBT\", \"PO2ART\" in the last 72 hours. INR:   Recent Labs     10/16/23  1825   INR 0.94       U/A:  Recent Labs     10/16/23  1941   COLORU Yellow   PHUR 7.0  7.0   WBCUA 0-2   RBCUA 0-2   CLARITYU Clear   SPECGRAV 1.010   LEUKOCYTESUR Negative   UROBILINOGEN 0.2   BILIRUBINUR Negative   BLOODU TRACE-INTACT*   GLUCOSEU Negative       No orders to display       EKG:   Echo:  Micro:     ASSESSMENT AND PLAN:   Bear Barnett is a 48 y.o. female with a medical history of left frontal CVA (09/18/2023) s/p left carotid endarterectomy on 09/22/2023 with no residual deficits, COPD, depression, current every day smoker who presented to [facility] ED with aphasia and left-sided hemiparesis.     Acute ischemic stroke; suspected  - LKW ~ 11:00 AM, not a candidate for TPK  - Cerebral angiogram showed no intracranial LVO, occluding stenosis of the left internal carotid artery just beyond its origin with robust collateral supply to the intracranial left ICA from branches of the left external carotid, 50% stenosis of the right common carotid artery bifurcation  - Neuro exam shows left-sided hemiparesis, aphasia, NIHSS of 6  - NCC and NSG consulted, appreciate recs   - MRI brain wo contrast, ordered   - High-intensity statin, ASA 81 mg   - SCDs, OK to

## 2023-10-17 NOTE — CONSULTS
Neurology / Neurocritical Care Consult Note      Joan Carroll, * is requesting this consult. Reason for Consult: acute ischemic stroke  Admission Chief Complaint: aphasia    History of Present Illness     Loretta Max is a 48 y.o. y/o female with history significant for COPD, anxiety/depression, L frontal lobe punctate infarct and carotid endarterectomy in September 2023 for L ICA stenosis. Per my interview with the patient she started having word finding difficulty and stutering speech with L sided weakness at ~1130 on 10/16/23. [facility] ~1800 on 10/16. A code stroke was called and a CT head and CTA head/neck were done. CT head showed no acute intracranial abnormality. CTA head/neck concerning for L ICA re-occlusion, however collateral flow is noted on the L side. After analysis on Viz, Dr. Rosa Maria Clement recommended patient come to [facility] for diagnostic angio and possible thrombectomy as there appeared to be an occlusion in the R MCA. Patient flown to [facility] and taken directly to hybrid OR for angio procedure. No LVO was found and no intervention was done during the angio. Patient admitted to [facility] ICU for further evaluation and treatment. History provided by:  Chart review and patient    REVIEW OF SYSTEMS:   Constitutional- No weight loss or fevers   Neurologic- No headache. No vision changes. C/o L sided numbness/weakness and word finding difficulty.      Past Medical, Surgical, Family, and Social History   PAST MEDICAL HISTORY:  Past Medical History:   Diagnosis Date    Anxiety     Asthma     COPD (chronic obstructive pulmonary disease) ([address])     Depression     Endometriosis     Knee pain      SURGICAL HISTORY:  Past Surgical History:   Procedure Laterality Date    CAROTID ENDARTERECTOMY Left 9/22/2023    LEFT CAROTID ENDARTERECTOMY performed by Juan Downey MD at [address] [facility]  ICU team    DEANNA Garcia CNP   Neurology & Neurocritical Care   10/16/2023 10:12 PM    ICU Patients:   [facility]: [address]  Floor / PCU Patients:  [facility]: [facility] Neurology    Critical Care:  Due to the immediate potential for life-threatening deterioration due to neurological failure, I spent 53 minutes providing critical care. This time excludes time spent performing procedures but includes time spent on direct patient care, history retrieval, review of the chart, and discussions with patient, family, and consultant(s).

## 2023-10-17 NOTE — CARE COORDINATION
Case Management Assessment  Initial Evaluation    Date/Time of Evaluation: 10/17/2023 9:53 AM  Assessment Completed by: Liana Elias RN    If patient is discharged prior to next notation, then this note serves as note for discharge by case management. Patient Name: Kali Nair                   YOB: 1970  Diagnosis: Acute cerebrovascular accident (CVA) [facility]) [I63.9]                   Date / Time: 10/16/2023  9:07 PM    Patient Admission Status: Inpatient   Readmission Risk (Low < 19, Mod (19-27), High > 27): Readmission Risk Score: 17.5    Current PCP: DEANNA Camacho  PCP verified by CM?  Yes    Chart Reviewed: Yes      History Provided by: Medical Record, Patient  Patient Orientation: Alert and Oriented    Patient Cognition: Alert    Hospitalization in the last 30 days (Readmission):  Yes    Readmission Assessment  Number of Days since last admission?: 8-30 days  Previous Disposition: Home with Family  Who is being Interviewed: Patient (chart review)  What was the patient's/caregiver's perception as to why they think they needed to return back to the hospital?: Other (Comment) (sudden onset of confuion and trouble speaking)  Did you visit your Primary Care Physician after you left the hospital, before you returned this time?: No  Why weren't you able to visit your PCP?: Did not have an appointment  Did you see a specialist, such as Cardiac, Pulmonary, Orthopedic Physician, etc. after you left the hospital?: Yes  Who advised the patient to return to the hospital?: Self-referral  Does the patient report anything that got in the way of taking their medications?: No  In our efforts to provide the best possible care to you and others like you, can you think of anything that we could have done to help you after you left the hospital the first time, so that you might not have needed to return so soon?: Identify patient's health literacy needs, Teaching during hospitalization regarding your illness, Teach back instructions regarding management of illness    Advance Directives:      Code Status: Full Code   Patient's Primary Decision Maker is: Legal Next of Kin    Discharge Planning:    Patient lives with: Spouse/Significant Other Type of Home: Apartment  Primary Care Giver: Self  Patient Support Systems include: Family Members, Spouse/Significant Other, Children   Current Financial resources: Medicaid ([employer])  Current community resources: None  Current services prior to admission: None            Current DME:  DEFERRED            Type of Home Care services:  PT, OT    ADLS  Prior functional level: Assistance with the following:, Housework, Shopping  Current functional level: Assistance with the following:, Housework, Shopping    Family can provide assistance at DC: Yes  Would you like Case Management to discuss the discharge plan with any other family members/significant others, and if so, who?  Yes (spouse as needed)  Plans to Return to Present Housing: Yes  Potential Assistance needed at discharge: [facility] [address], Other (Comment) (acute rehab)            Potential DME:  deferred  Patient expects to discharge to: [address] [place] [address] Road for transportation at discharge:  tbd pending disposition     Financial    Payor: [place] / Plan: [place] / Product Type: *No Product type* /     Does insurance require precert for SNF: Yes    Potential assistance Purchasing Medications: No  [facility] [id number] [address] [facility] [phone]  [address] [place], [place] [address]  Phone: [phone] Fax: [phone]      Notes:    Factors facilitating achievement of predicted outcomes: Family support and Cooperative    Barriers to discharge: Medical complications      Gabbi Alamo RN  Case Management Department  [phone]

## 2023-10-17 NOTE — ANESTHESIA POSTPROCEDURE EVALUATION
Department of Anesthesiology  Postprocedure Note    Patient: Bear Barnett  MRN: [id number]  YOB: 1970  Date of evaluation: 10/16/2023      Procedure Summary     Date: 10/16/23 Room / Location: [facility] Special Procedures    Anesthesia Start: 2033 Anesthesia Stop:     Procedures:       IR ANGIOGRAM CAROTID CEREBRAL RIGHT      Procedure Not Yet Scheduled Diagnosis: (stroke)    Scheduled Providers:  Responsible Provider: Leatha Starr DO    Anesthesia Type: MAC ASA Status: 4 - Emergent          Anesthesia Type: No value filed. Gabino Phase I:      Gabino Phase II:        Anesthesia Post Evaluation    Patient location during evaluation: ICU  Patient participation: complete - patient participated  Level of consciousness: awake  Pain score: 0  Airway patency: patent  Nausea & Vomiting: no nausea and no vomiting  Complications: no  Cardiovascular status: hemodynamically stable  Respiratory status: acceptable  Hydration status: euvolemic  Comments: Patient with dysarthria which was present when I met her in the operating room prior to her procedure.   Pain management: adequate

## 2023-10-17 NOTE — ED NOTES
Attempted to call report to Gr[facility] Parra [facility] Lab at 2047. No response to second attempt.      Alicia Jones RN  10/16/23 2109       Alicia Jones RN  10/16/23 2110

## 2023-10-17 NOTE — PROGRESS NOTES
V2.0    [facility] Progress Note      Name:  Shaun Alcaraz /Age/Sex: 1970  (48 y.o. female)   MRN & CSN:  [id number] & [id number] Encounter Date/Time: 10/17/2023 12:33 PM EDT   Location:  [facility]- PCP: DEANNA Preston     Attending:Jorge Lopez MD       Hospital Day: 2    Assessment and Recommendations   Shaun Alcaraz is a 47 yo female with a medical history of left frontal CVA (2023) s/p left carotid endarterectomy on 2023 with no residual deficits, COPD, depression, current every day smoker who presented to [facility] ED with aphasia and left-sided hemiparesis and stuttering. CT showed Lt ICA occlusion so she underwent Cerebral angiogram which showed no intracranial LVO, but showed some possible R MCA distal emboli shower and occlusion of the L ICA origin with good collateral supply from her ECA. MRI was negative for stroke     Aphasia  Lt sided weakness   HX of recent stroke 2023 that required left CEA  -No acute stroke on MRI  -NCC on board  -PT/OT  -Will need 30 day event monitor     COPD,stable  Tobacco abuse   Depression      Personally reviewed Lab Studies and Imaging         Subjective:     Weakness is better. Still with aphasia       Review of Systems:      Pertinent positives and negatives discussed in HPI    Objective: Intake/Output Summary (Last 24 hours) at 10/17/2023 1233  Last data filed at 10/17/2023 1000  Gross per 24 hour   Intake 250 ml   Output 1919 ml   Net -1669 ml      Vitals:   Vitals:    10/17/23 0900 10/17/23 0905 10/17/23 1000 10/17/23 1100   BP: 133/82  136/82 (!) 118/58   Pulse: 73 73 72 74   Resp: 10 17 22 20   Temp:       TempSrc:       SpO2: 95% 97%     Weight:       Height:             Physical Exam:      General: NAD  Eyes: EOMI  ENT: neck supple  Cardiovascular: Regular rate.   Respiratory: Clear to auscultation  Gastrointestinal: Soft, non tender  Genitourinary: no suprapubic tenderness  Musculoskeletal: No edema  Skin: warm, for:  \"BC\"  No results found for: \"BLOODCULT2\"  Organism: No results found for: \"ORG\"      Electronically signed by Dwight Vargas MD on 10/17/2023 at 12:33 PM

## 2023-10-17 NOTE — PROGRESS NOTES
Occupational Therapy  Facility/Department: [facility] ICU  Occupational Therapy Initial Assessment and Tx    Name: Gilberto Bravo  : 1970  MRN: [id number]  Date of Service: 10/17/2023    Discharge Recommendations: Gilberto Bravo scored a 19/24 on the AM-PAC ADL Inpatient form. Current research shows that an AM-PAC score of 18 or greater is typically associated with a discharge to the patient's home setting. Based on the patient's AM-PAC score, and their current ADL deficits, it is recommended that the patient have 2-3 sessions per week of Occupational Therapy at d/c to increase the patient's independence. At this time, this patient demonstrates the endurance and safety to discharge home with (home vs OP services) and a follow up treatment frequency of 2-3x/wk. Please see assessment section for further patient specific details. If patient discharges prior to next session this note will serve as a discharge summary. Please see below for the latest assessment towards goals. OT Equipment Recommendations  Other: Pt has needed DME       Patient Diagnosis(es): There were no encounter diagnoses. Past Medical History:  has a past medical history of Anxiety, Asthma, COPD (chronic obstructive pulmonary disease) ([address]), Depression, Endometriosis, and Knee pain. Past Surgical History:  has a past surgical history that includes Tonsillectomy;  section; hernia repair; Endometrial ablation; Hysterectomy; Incontinence surgery (2017); Knee arthroscopy (Right); and Carotid endarterectomy (Left, 2023). Assessment   Performance deficits / Impairments: Decreased functional mobility ; Decreased safe awareness;Decreased ADL status; Decreased endurance;Decreased strength;Decreased cognition;Decreased coordination  Assessment: Pt is 48 y.o female who presents from home. Pt presents functioning below baseline of mod I and demos deficits listed above.  Pt requires CGA for functional t/f, functional mobility

## 2023-10-17 NOTE — TELEPHONE ENCOUNTER
Dr Lucy Hernandez selected a disposition (to transfer) before call was made to advise of the 30 day risk of readmission

## 2023-10-17 NOTE — PROGRESS NOTES
[facility]  Endovascular & Stroke   Progress Note      INTERVAL HISTORY:    Mri BRAIN done overnight - negative for new ischemic changes. Her small left frontal ischemic stroke from last month is visible but has no new component  Patient feels her left strength is improved.  She feels her speech problems intermittently improve      OBJECTIVE:     LOS: 1 day       Vitals:    10/17/23 0530 10/17/23 0600 10/17/23 0700 10/17/23 0905   BP: (!) 148/81 130/69 139/65    Pulse: 69 65 67 73   Resp: 24 20 26 17   Temp:       TempSrc:       SpO2: 94% 95% 90% 97%   Weight:       Height:                Intake/Output Summary (Last 24 hours) at 10/17/2023 0938  Last data filed at 10/17/2023 0330  Gross per 24 hour   Intake 10 ml   Output 1919 ml   Net -1909 ml          Recent Results (from the past 24 hour(s))   POCT Glucose    Collection Time: 10/16/23  6:21 PM   Result Value Ref Range    POC Glucose 109 (H) 70 - 99 mg/dl    Performed on ACCU-CHEK    CBC with Auto Differential    Collection Time: 10/16/23  6:25 PM   Result Value Ref Range    WBC 10.9 4.0 - 11.0 K/uL    RBC 4.63 4.00 - 5.20 M/uL    Hemoglobin 14.2 12.0 - 16.0 g/dL    Hematocrit 41.2 36.0 - 48.0 %    MCV 89.0 80.0 - 100.0 fL    MCH 30.6 26.0 - 34.0 pg    MCHC 34.4 31.0 - 36.0 g/dL    RDW 13.4 12.4 - 15.4 %    Platelets 079 516 - 186 K/uL    MPV 7.8 5.0 - 10.5 fL    Neutrophils % 58.2 %    Lymphocytes % 32.9 %    Monocytes % 5.6 %    Eosinophils % 2.5 %    Basophils % 0.8 %    Neutrophils Absolute 6.3 1.7 - 7.7 K/uL    Lymphocytes Absolute 3.6 1.0 - 5.1 K/uL    Monocytes Absolute 0.6 0.0 - 1.3 K/uL    Eosinophils Absolute 0.3 0.0 - 0.6 K/uL    Basophils Absolute 0.1 0.0 - 0.2 K/uL   Basic Metabolic Panel    Collection Time: 10/16/23  6:25 PM   Result Value Ref Range    Sodium 140 136 - 145 mmol/L    Potassium 3.5 3.5 - 5.1 mmol/L    Chloride 100 99 - 110 mmol/L    CO2 26 21 - 32 mmol/L    Anion Gap 14 3 - 16    Glucose 108 (H) 70 - 99 mg/dL    BUN 10 7 -

## 2023-10-17 NOTE — PROGRESS NOTES
O2 saturations dropped to high 80's. Woke pt up and asked if they had any hx of sleep apnea. Pt said she did. Applied 2L nasal canula.

## 2023-10-17 NOTE — PROGRESS NOTES
4 Eyes Skin Assessment     NAME:  Melissa Medrano  YOB: 1970  MEDICAL RECORD NUMBER:  [id number]    The patient is being assessed for  Cath Lab Post-Op    I agree that at least one RN has performed a thorough Head to Toe Skin Assessment on the patient. ALL assessment sites listed below have been assessed. Areas assessed by both nurses:    Head, Face, Ears, Shoulders, Back, Chest, Arms, Elbows, Hands, Sacrum. Buttock, Coccyx, Ischium, Legs. Feet and Heels, and Under Medical Devices         Does the Patient have a Wound? Yes wound(s) were present on assessment.  LDA wound assessment was Initiated and completed by RN       Urabn Prevention initiated by RN: No  Wound Care Orders initiated by RN: No    Pressure Injury (Stage 3,4, Unstageable, DTI, NWPT, and Complex wounds) if present, place Wound referral order by RN under : No    New Ostomies, if present place, Ostomy referral order under : No     Nurse 1 eSignature: Electronically signed by Lorenza Hardin RN on 10/17/23 at 3:14 AM EDT    **SHARE this note so that the co-signing nurse can place an eSignature**    Nurse 2 eSignature: Electronically signed by Juanita Cole RN on 10/17/23 at 4:24 AM EDT

## 2023-10-17 NOTE — PROGRESS NOTES
Neuro status stable overnight. NIHHS score of 5 at most recent assessment. Slight changes between each assessment but score remains the same. Still unable to void. CT w contrast ordered. Waiting for AM labs to check kidney function prior to scan d/t pt receiving contrast 3 times within this 24 hr period.

## 2023-10-17 NOTE — H&P
STROKE / INTRACRANIAL HEMORRHAGE CONSULTATION    Admitting Physician:   Primary Care Physician: DEANNA Henao    Chief Complaint: stroke    HPI: 52y F with history of small left hemispheric stroke one month ago, managed by our team at [facility], found to have critical stenosis of left ICA origin. She underwent diagnostic catheter based angiogram and then left CEA. She discharged home without issue. Was seen in my clinic one week ago and had no deficits and was recovering well. Today she reportedly presented to [facility] ED with acute onset left sided weakness and speech difficulty, CTA revealed possible right MCA occlusion and interval occlusion of the left ICA origin.     Time last seen well: 1130a    Time of arrival: 1859 to outside [facility] ER    PMHx:  Past Medical History:   Diagnosis Date    Anxiety     Asthma     COPD (chronic obstructive pulmonary disease) ([address])     Depression     Endometriosis     Knee pain         SURGHx:  Past Surgical History:   Procedure Laterality Date    CAROTID ENDARTERECTOMY Left 9/22/2023    LEFT CAROTID ENDARTERECTOMY performed by Romie Brown MD at [address] (CERVIX STATUS UNKNOWN)      INCONTINENCE SURGERY  2017    KNEE ARTHROSCOPY Right     2012    TONSILLECTOMY          FAMHx:   Family History   Problem Relation Age of Onset    Other Mother 52        heart failure    Diabetes Mother     Heart Disease Mother     Alcohol Abuse Father     Diabetes Brother     Other Brother         drug addiction    Other Brother         drug addiction    Other Brother         drug addiction       SOCHx:   Social History     Tobacco Use    Smoking status: Every Day     Packs/day: 0.50     Years: 35.00     Additional pack years: 0.00     Total pack years: 17.50     Types: Cigarettes     Start date: 1/1/1984    Smokeless tobacco: Never    Tobacco comments:     Trying to cut down   Substance Use Topics    Alcohol use: No     Alcohol/week: 0.0 standard drinks of alcohol       ALLERGIES:Tomato     MEDS:  Prior to Admission medications    Medication Sig Start Date End Date Taking? Authorizing Provider   sertraline (ZOLOFT) 100 MG tablet Take 1 tablet by mouth daily 10/13/23   Lacy Restrepo APRN   Handicap Placard MISC by Does not apply route 5 years 10/13/23   Lacy Restrepo APRN   fluticasone-umeclidin-vilant (TRELEGY ELLIPTA) 250-23.3-83 MCG/ACT AEPB inhaler Inhale 1 puff into the lungs daily 10/13/23   DEANNA Del Rio   aspirin 81 MG chewable tablet Take 1 tablet by mouth daily 23   Dilma Quezada MD   atorvastatin (LIPITOR) 80 MG tablet Take 1 tablet by mouth nightly 23   Dilma Quezada MD   albuterol sulfate HFA (VENTOLIN HFA) 108 (90 Base) MCG/ACT inhaler Inhale 2 puffs into the lungs 4 times daily as needed for Wheezing or Shortness of Breath 7/10/23   Carolann Restrepo APRN    NIH Stroke Scale    Interval: Baseline  Time: 11:41 AM  Person Administering Scale:     1a  Level of consciousness: 0=alert; keenly responsive   1b. LOC questions:  0=Performs both tasks correctly   1c. LOC commands: 0=Performs both tasks correctly   2. Best Gaze: 0=normal   3. Visual: 0=No visual loss   4. Facial Palsy: 1=Minor paralysis (flattened nasolabial fold, asymmetric on smiling)   5a. Motor left arm: 1=Drift, limb holds 90 (or 45) degrees but drifts down before full 10 seconds: does not hit bed   5b. Motor right arm: 0=No drift, limb holds 90 (or 45) degrees for full 10 seconds   6a. motor left le=Some effort against gravity, limb cannot get to or maintain (if cured) 90 (or 45) degrees, drifts down to bed, but has some effort against gravity   6b  Motor right le=No drift, limb holds 90 (or 45) degrees for full 10 seconds   7. Limb Ataxia: 0=Absent   8.   Sensory: 1=Mild to moderate sensory loss; patient feels pinprick is less sharp or is dull on the affected side; there is a loss of

## 2023-10-17 NOTE — ED NOTES
Unable to complete final NIHSS reassessment prior to [facility]'s departure due to time constraints. No obvious change in patient's neuro status prior to departure.      Violeta Winchester RN  10/16/23 2028

## 2023-10-17 NOTE — ED NOTES
Attempted to call report to [facility], unable to reach anyone at this time. Voice mail left requesting call back for report.      Waddell Hodgkin, RN  10/16/23 2030

## 2023-10-17 NOTE — ED NOTES
Report given to [employer] at this time. Patient care transferred to [facility] [place] at this time. Air crew denies questions.      Ramón Strange RN  10/16/23 2016

## 2023-10-17 NOTE — NURSE NAVIGATOR
Patient's chart reviewed for Stroke Core Measures and additional needs:    [x]   VTE prophylaxis - SCDs    [x]   Antithrombotic (if applicable) - ASA   [x]   Swallow screen prior to PO intake - completed    [x]   Lipids / A1C ordered or resulted - Collected, in process    [x]   Therapy ordered - SLP consult completed; PT/OT ordered    [x]   Care plan and Education template - Added       Verified patient has received educational Stroke booklet - patient reports receiving booklet last admission, another not needed at this time. Patients personal risk factors specific to stroke/TIA include: Smoking, Overweight, Family h/o heart disease - discussed with patient. Navigator to continue to follow patient while admitted, to assist with follow up and discharge planning as needed.      Nurse eSignature: Electronically signed by Simi Srinivasan RN on 10/17/23 at 2:29 PM EDT - Neuroscience Navigator

## 2023-10-17 NOTE — PLAN OF CARE
Problem: Discharge Planning  Goal: Discharge to home or other facility with appropriate resources  10/17/2023 0531 by Jose Franco RN  Outcome: Progressing  10/17/2023 0531 by Jose Franco RN  Outcome: Progressing  Flowsheets (Taken 10/16/2023 2200)  Discharge to home or other facility with appropriate resources:   Identify barriers to discharge with patient and caregiver   Identify discharge learning needs (meds, wound care, etc)     Problem: Skin/Tissue Integrity  Goal: Absence of new skin breakdown  Description: 1. Monitor for areas of redness and/or skin breakdown  2. Assess vascular access sites hourly  3. Every 4-6 hours minimum:  Change oxygen saturation probe site  4. Every 4-6 hours:  If on nasal continuous positive airway pressure, respiratory therapy assess nares and determine need for appliance change or resting period.   Outcome: Progressing     Problem: Safety - Adult  Goal: Free from fall injury  Outcome: Progressing  Flowsheets (Taken 10/16/2023 2300)  Free From Fall Injury: Instruct family/caregiver on patient safety     Problem: ABCDS Injury Assessment  Goal: Absence of physical injury  Outcome: Progressing  Flowsheets (Taken 10/16/2023 2300)  Absence of Physical Injury: Implement safety measures based on patient assessment     Problem: Pain  Goal: Verbalizes/displays adequate comfort level or baseline comfort level  Outcome: Progressing

## 2023-10-17 NOTE — DISCHARGE INSTRUCTIONS
-- Please take your baby aspiring Anna Franks. Baby aspirin prevents little clots from becoming large ones and blocking the blood vessels in your brain and heart. -- If you ever experience any symptoms of one-sided weakness like an arm, leg, and both on the same side, go to the emergency room. Although this time your symptoms went away on their own, it's impossible to know in the moment and you need to have a hospital get brain scans and be ready to help you if things do not improve. -- Please exercise 30 minutes a day. A brisk walk is sufficient or any other aerobic activity you enjoy would be great. -- Eat a plant-based diet high in fiber and protein. Carbohydrates and fats clot the arteries and clogged arteries has been the cause of your stroke and temporary stroke-like episode. [facility] Stroke Program Survey  The [address] [place] [address] values your feedback related to your recent hospital visit and admission. We strive to improve our Neuroscience program to promote better outcomes and recoveries for all our patients. The anonymous survey below consists of a few questions that are related to your stay and around your Stroke diagnosis, treatment, and recovery. It is anonymous and has only a few questions. The estimated length of time needed to complete this survey is 3 minutes or less. Thank you for completing this survey!

## 2023-10-17 NOTE — PROGRESS NOTES
Pt straight cathed d/t bladder scan showing 569mL. Removed 800mL. Will see if she can urinate on her own moving forward. PRN valium given d/t claustrophobia r/t upcoming MRI. Daughter at bedside.

## 2023-10-17 NOTE — CONSULTS
ICU Progress Note    Admit Date: 10/16/2023  Hospital Day: 1  ICU Day: 10h  Vent Day: None  IV Access: Peripheral  IV Fluids: None  Vasopressors: None  Antibiotics: None  Diet: Diet NPO    CC: No chief complaint on file. Interval history:  The patient desatted to high 80's overnight and was placed on 2L O2. She also required a straight cath that returned 600ml urine. She continues to report stuttering speech and weakness of her left arm and leg, but she does not know if the weakness has improved because she has not gotten out of bed. She complains of new lower back pain that is bilateral. She reports that it has been hurting since she got her imaging yesterday and it is currently well controlled with tylenol and a heating pad. HPI:  Cathy Mckinley is a 49 yo female with a medical history of left frontal CVA (09/18/2023) s/p left carotid endarterectomy on 09/22/2023 with no residual deficits, COPD, depression, current every day smoker who presented to [facility] ED with aphasia and left-sided hemiparesis. Patient states she noticed she was having some difficulty remembering where to go for her medical appointment this morning, was confused about where she was, last known well around 11 AM. She reports when she went home after her appointment she began having difficulties with her speech, stuttering and having some difficulty with word finding. Her stuttering progressively worsened, and she began to feel weak in her left upper and lower extremity around 4 PM, after which her family convinced her to go to the ER. She denies feeling any dizziness, lightheadedness, syncope, falls, headache, chest pain, palpitations, swelling in extremities, shortness of breath, nausea, vomiting, abdominal pain, constipation, diarrhea, difficulty urinating, hematuria.      She was recently hospitalized in September with intermittent dizziness with peripheral vision lost in her left eye and was found to have an 7:35 AM    This patient has been staffed and discussed with Dr. Latisha Grady. Pulmonary & Critical Care    He has a history of recent left frontal stroke and subsequent left CEA who presents emergency room with weakness of her left side yesterday with a stuttering speech. She was outside the window of thrombolytics. Initial CTA showed questionable right M2 occlusion however diagnostic angiogram and CT perfusion did not reveal any new CVA. Appreciate neurology assistance with this more challenging case.     Latisha Grady MD

## 2023-10-17 NOTE — PROGRESS NOTES
Speech Language Pathology  Facility/Department:[facility] ICU  Bedside Swallow Evaluation/treatment   Speech Evaluation   Name: Kannan Valdovinos  : 1970  MRN: [id number]                                                         Patient Diagnosis(es):   Patient Active Problem List    Diagnosis Date Noted    Acute cerebrovascular accident (CVA) ([address]) 10/16/2023    TIA (transient ischemic attack) 2023    Carotid artery stenosis, symptomatic, left 2023    Anxiety 2023    Pure hypercholesterolemia 07/10/2023    Internal carotid artery stenosis, left 2023    COPD exacerbation ([address]) 2023    COPD (chronic obstructive pulmonary disease) ([address]) 2023    Pre-syncope 2023    Palpitations 2023    Arthritis of right knee 2018    Chronic pain of right knee 2018    Hypothyroidism 2018    Prediabetes 2018    Depression 2018    RLS (restless legs syndrome) 2018    Other hyperlipidemia 2018    Sensorineural hearing loss, bilateral 2016    Asthma 2014    Tobacco abuse 2014       Past Medical History:   Diagnosis Date    Anxiety     Asthma     COPD (chronic obstructive pulmonary disease) ([address])     Depression     Endometriosis     Knee pain      Past Surgical History:   Procedure Laterality Date    CAROTID ENDARTERECTOMY Left 2023    LEFT CAROTID ENDARTERECTOMY performed by Luh Baldwin MD at [address] (CERVIX STATUS UNKNOWN)      INCONTINENCE SURGERY  2017    KNEE ARTHROSCOPY Right     2012    TONSILLECTOMY         Reason for Referral:  Kannan Valdovinos  was referred for a Speech Therapy evaluation to assess swallow function and/or communication. History of Present Illness  Per MD notes:  Kannan Valdovinos is a 48 y.o. female who presents to the emergency department secondary to concern for aphasia.   Patient states that Yes  Behavior/Cognition: Ox3  Communication Observation: halting verbalizations  Follows Directions: WFL  Dentition/Oral Mucosa: missing some teeth   Oral motor exam: WFL  Vocal Quality: WFL  Respiratory Status/oxygen requirements: oxygen via nasal canula  Vision/Hearing: wears glasses at all times, hearing functional   Patient Complaint: pt c/o back pain  Patient Positioning: upright in bed  Prior Level of Function: independent- lives at home with      Prior Dysphagia History:   Pt was last seen by Speech Therapy 9/19/23- at that time swallowing was [facility]. Recommended a regular diet with thin liquids. Pt has reported some difficulty with swallowing/coughing when eating and with her own secretions. Bedside Swallowing Evaluation Impression 10/17/23   ROM of oral structures was [facility]. Pt able to cough and dry swallow on command. Pt analyzed with trials of ice chips, applesauce, cracker, pill with water by cup and water by cup and straw. Pt had no difficulty closing lips around spoon or drawing liquid up a straw. Pt had minor difficulty with mastication with solids, likely due to missing some dentition. There was no anterior spillage or oral residue noted with any consistency. Pt demonstrated no s/s aspiration with any consistency presented. Pt able to initiate swallow without difficulty with laryngeal movement observed. Vocal quality remained clear throughout. No coughing, throat clearing or choking observed with any consistency. Pt consumed 3oz water uninterrupted by cup without difficulty or s/s aspiration. Instrumental assessment results   Not indicated this date. Will continue to monitor for need     Speech, Language, Cognitive Evaluation 10/17/23  Pt is alert and oriented x3. Pt is able to follow commands and respond to questions appropriately. Pt with halting, dysfluent speech at the conversation level.   Did not formally assess cognition, reading writing or math due to time constraints and

## 2023-10-17 NOTE — ED NOTES
ED SBAR report provider to Nick Victor RN. Patient to be transported to Room ICU 24 after procedure. Patient transported with bedside cardiac monitor. IV site clean, dry, and intact. Updated patient and family on plan of care.        Cici Worley RN  10/16/23 2018

## 2023-10-17 NOTE — PROGRESS NOTES
sensation to L LE today which she states is chronic since CVA in September 2023. Pt requires CGA x 1 today for safe transfers and ambulation. Pt appears to be approaching her recent functional baseline. Rec 24hr assist at d/c (children can provide) and home PT. Pt will also need rolling walker for safe amb at home - walker is currently missing per pt report. Discussed with RN. Treatment Diagnosis: impaired gait and transfers  Therapy Prognosis: Good  Decision Making: Medium Complexity  Requires PT Follow-Up: Yes     Plan   Physcial Therapy Plan  General Plan: 2-5 times per week  Current Treatment Recommendations: Strengthening, Functional mobility training, Gait training, Transfer training, Patient/Caregiver education & training, Stair training, Balance training, Therapeutic activities  Safety Devices  Type of Devices: Call light within reach, Chair alarm in place, Nurse notified, Left in chair     Restrictions  Position Activity Restriction  Other position/activity restrictions: up as tolerated     Subjective   Pain: Pt reports LLE tremor/pain that is baseline; not rated, repositioned for comfort, RN aware  General  Chart Reviewed: Yes  Patient assessed for rehabilitation services?: Yes  Additional Pertinent Hx: Admit 10/16 from outside ED with aphasia, L sided weakness; transfer to [facility]; neurosurg consulted: Left ICA is now occluded; catheter angigoram on 10/16 revealed no intracranial LVO, appropriate collateral supply to left MCA territory PMHx: CVA Sept 2023, anxiety, asthma, COPD, hernia repair, R knee scope  Referring Practitioner: DO Sanjuana  Diagnosis: acute CVA  Subjective  Subjective: Pt found semisupine in bed. Agrees to PT with min encouragement. Reports discomfort L LE due to muscle spasms - chronic per pt. Denies pain.          Social/Functional History  Social/Functional History  Lives With: Daughter, Son (4 adult children living/come and go)  Type of Home: Apartment  Home Layout: One level  Home Access: Stairs to enter with rails  Entrance Stairs - Number of Steps: 20-23 (2 outside then flights of stairs on inside - apartment is on 3rd floor)  Bathroom Shower/Tub: Tub/Shower unit  Bathroom Toilet: Standard (sink nearby for leverage)  Home Equipment: Walker, rolling  Has the patient had two or more falls in the past year or any fall with injury in the past year?: No  ADL Assistance: [address]  (pt does not complete; children complete)  Ambulation Assistance: Independent (with RW)  Transfer Assistance: Independent  Active : No  Patient's  Info: boyfriend and brother  Leisure & Hobbies: play with grand babies  Additional Comments: Pt reports she has 24/7 assistance. Vision/Hearing  Vision  Vision: Impaired  Vision Exceptions: Wears glasses at all times  Hearing  Hearing: Within functional limits    Cognition   Orientation  Overall Orientation Status: Within Functional Limits  Cognition  Overall Cognitive Status: Exceptions  Arousal/Alertness: Delayed responses to stimuli  Following Commands: Follows multistep commands with repitition; Follows multistep commands with increased time  Attention Span: Attends with cues to redirect  Memory: Appears intact  Safety Judgement: Decreased awareness of need for safety  Insights: Decreased awareness of deficits  Initiation: Requires cues for some  Sequencing: Requires cues for some  Cognition Comment: dysarthric speech noted intermittently; delayed processing/increased time for response;     Objective         Gross Assessment  AROM: Within functional limits  Strength: Generally decreased, functional (L LE strength 4-/5 hip flex, knee flex/ext, ankle DF; chronic per pt)  Coordination: Within functional limits (B heel to shin coordination intact)  Sensation: Impaired (impaired sensation throughout L LE)              Bed mobility  Supine to Sit: Contact guard assistance (HOB slightly elevated)  Scooting: Contact guard

## 2023-10-17 NOTE — PROCEDURES
local anesthetic using a modified Seldinger technique . A short sheath was inserted over a wire and secured in place. The guide catheter and carotid access catheter were advanced over a Glidewire into the aortic arch. The access catheter was reformatted and used to catheterize the common carotid artery. Angiography of the cervical and lower cranial circulation was done. The wire was replaced and the cervical internal carotid artery was selected. The guide catheter was advanced to the cervical internal carotid artery, and the access catheter and wire were removed. Follow up angiography of the intracranial circulation was done, including multiple oblique biplane views. This ruled out an intracranial occlusion. Next the diagnostic catheter was replaced and the left CCA was selected. Angiography of the cervical and cranial circulation confirmed that the left ICA origin was occluded, with robust ECA to ICA collateralization at the skull base, supplying the left MCA territory without large vessel occlusion. Finally, angiography of the left VA was done, also ruling out a large vessel occlusion or any clear change compared to the angiogram from 9/21/2023. At this point, I decided to complete the procedure. The catheters were removed and angiography of the femoral artery was done through the access sheath. The sheath was exchanged for the closure device. The patient moved to the ICU in stable condition. FINDINGS:  RIGHT COMMON CAROTID ARTERY, CERVICAL VIEWS: The right common carotid origin is normal. The right common carotid artery bifurcation has mild, non flow-limiting stenosis, measuring about 50%. The cervical segment of the right internal carotid artery has normal caliber and course. The proximal branches of the external carotid artery are normal.     RIGHT INTERNAL CAROTID ARTERY, INTRACRANIAL VIEWS: The intracranial right internal carotid artery has normal caliber over its entire course.  The provides supply to the left CARMELO territory, and pial collateralization to the left MCA territory is present. There is contribution through the left posterior communicating artery to the ICA terminus and left MCA territory. RIGHT COMMON FEMORAL ARTERY: The common iliac, external iliac, and common femoral arteries are patent. The common femoral artery bifurcates into the superficial and deep femoral arteries. The groin sheath was inserted within the common femoral artery above the bifurcation without evidence of injury or thrombosis. COMPLICATIONS: None immediate. EBL: minimal    SPECIMEN: none        IMPRESSION:    There is no intracranial large vessel occlusion. There is interval occlusion of the left internal carotid artery origin, with robust collateral supply to the intracranial left ICA from branches of the left external carotid artery. Diagnostic study only. No thrombectomy was attempted.

## 2023-10-17 NOTE — PROGRESS NOTES
Bladder scanned pt d/t pt complaints of bladder pressure. Scanned vol 869. Straight cath of 650mL. Post-cath scan showed 469mL. Pelvic area feels hard upon palpation. Pt c/o pelvic pain. States she thinks she has a bladder or kidney infection. Asked pt if she has had either before and she says that she has. ICU residents notified.

## 2023-10-17 NOTE — PROGRESS NOTES
NEUROLOGY / NEUROCRITICAL CARE PROGRESS NOTE       Patient Name: Rubie Phalen YOB: 1970   Sex: Female Age: 48 yrs     CC / Reason for Consult: acute ischemic stroke    Interval Hx / Changes over last 24 hours:   -Patient ambulating in room this morning with walker with minimal assistance  -s/p diagnostic angiogram. No LVO, no thrombectomy.    -No new stroke on MRI  -Systolic BP's have been in the 130's this morning     ROS: +LUE/LLE weakness, +LUE sensation changes +trouble speaking    HISTORY   Admission HPI:   Rubie Phalen is a 48 y.o. y/o female with history significant for COPD, anxiety/depression, L frontal lobe punctate infarct and carotid endarterectomy in September 2023 for L ICA stenosis. Per my interview with the patient she started having word finding difficulty and stutering speech with L sided weakness at ~1130 on 10/16/23. [facility] ~1800 on 10/16. A code stroke was called and a CT head and CTA head/neck were done. CT head showed no acute intracranial abnormality. CTA head/neck concerning for L ICA re-occlusion, however collateral flow is noted on the L side. After analysis on [facility], Dr. Tere Garcia recommended patient come to [facility] for diagnostic angio and possible thrombectomy as CTA revealed possible right MCA m2 occlusion, and interval left ICA origin occlusion. Patient flown to [facility] and taken directly to hybrid OR for angio procedure. No LVO was found and no intervention was done during the angio. Patient admitted to [facility] ICU for further evaluation and treatment. PMH Past Medical History:   Diagnosis Date    Anxiety     Asthma     COPD (chronic obstructive pulmonary disease) (HCC)     Depression     Endometriosis     Knee pain       Allergies Allergies   Allergen Reactions    Tomato Anaphylaxis      Diet ADULT DIET;  Regular   Isolation No active isolations     CURRENT SCHEDULED MEDICATIONS   Inpatient Medications Palate elevated symmetrically  CN11: Traps full strength on shoulder shrug  CN12: Tongue midline with protrusion    Motor Exam: To note, while performing the NIH and motor exam separately in each limb, the patient appears to have rather significant weakness in her LUE and LLE. However, if you distract her, she will talk and gesture using her left arm and also eat using her left arm with what appears to be minimal difficulty and weakness. I also witnessed her walk to the bathroom with a walker with minimal to no assistance. R  L    Deltoid 5  3   Biceps 5 3   Triceps 5 3   Wrist extension  5 3   Interossei 5 3      R  L    Hip flexion  5  3   Hip extension  5 3   Knee flexion  5 3   Knee extension  5 3   Ankle dorsiflexion  5 2   Ankle plantar flexion  5 3     Sensory: light touch intact and symmetric in all 4 extremities. No sensory extinction on bilateral simultaneous stimulation  Cerebellar/coordination: finger nose finger normal without ataxia  Tone: normal in all 4 extremities  Gait: able to walk with steady gait with walker    OTHER SYSTEMS:  Cardiovascular: Warm, appears well perfused   Respiratory: Easy, non-labored respiratory pattern   Abdominal: Abdomen is without distention   Extremities: Upper and lower extremities are atraumatic in appearance without deformity. No swelling or erythema. ASSESSMENT & RECOMMENDATIONS   Assessment:  -Kiara Cartwright is a 48year old Female with a history of a recent small Left MCA stroke secondary to Left ICA occlusion s/p CEA on 9/22 who now presented with acute onset expressive aphasia and left sided weakness yesterday. There was initial concern for reocclusion of her Left ICA and possible new Right m2 occlusion on CTA.  Thus, she was trasnported here to [facility] for emergent dignostic angiogram and possible thrombectomy with Dr. Cheryl Mckinney.     -Catheter angiogram on 10/16 showed that the Left ICA is now occluded but with robust collateral flow from the branches of the left

## 2023-10-18 ENCOUNTER — TELEPHONE (OUTPATIENT)
Dept: INTERNAL MEDICINE CLINIC | Age: 53
End: 2023-10-18

## 2023-10-18 VITALS
WEIGHT: 247.14 LBS | RESPIRATION RATE: 16 BRPM | DIASTOLIC BLOOD PRESSURE: 101 MMHG | TEMPERATURE: 97.8 F | HEIGHT: 66 IN | BODY MASS INDEX: 39.72 KG/M2 | OXYGEN SATURATION: 96 % | HEART RATE: 70 BPM | SYSTOLIC BLOOD PRESSURE: 128 MMHG

## 2023-10-18 LAB
ANION GAP SERPL CALCULATED.3IONS-SCNC: 12 MMOL/L (ref 3–16)
BUN SERPL-MCNC: 10 MG/DL (ref 7–20)
CALCIUM SERPL-MCNC: 9.3 MG/DL (ref 8.3–10.6)
CHLORIDE SERPL-SCNC: 105 MMOL/L (ref 99–110)
CO2 SERPL-SCNC: 26 MMOL/L (ref 21–32)
CREAT SERPL-MCNC: <0.5 MG/DL (ref 0.6–1.1)
DEPRECATED RDW RBC AUTO: 13.6 % (ref 12.4–15.4)
EST. AVERAGE GLUCOSE BLD GHB EST-MCNC: 122.6 MG/DL
GFR SERPLBLD CREATININE-BSD FMLA CKD-EPI: >60 ML/MIN/{1.73_M2}
GLUCOSE SERPL-MCNC: 106 MG/DL (ref 70–99)
HBA1C MFR BLD: 5.9 %
HCT VFR BLD AUTO: 40.1 % (ref 36–48)
HGB BLD-MCNC: 13.7 G/DL (ref 12–16)
MCH RBC QN AUTO: 30.3 PG (ref 26–34)
MCHC RBC AUTO-ENTMCNC: 34.2 G/DL (ref 31–36)
MCV RBC AUTO: 88.6 FL (ref 80–100)
PLATELET # BLD AUTO: 298 K/UL (ref 135–450)
PMV BLD AUTO: 8.4 FL (ref 5–10.5)
POTASSIUM SERPL-SCNC: 3.9 MMOL/L (ref 3.5–5.1)
RBC # BLD AUTO: 4.53 M/UL (ref 4–5.2)
SODIUM SERPL-SCNC: 143 MMOL/L (ref 136–145)
WBC # BLD AUTO: 10.6 K/UL (ref 4–11)

## 2023-10-18 PROCEDURE — 99231 SBSQ HOSP IP/OBS SF/LOW 25: CPT | Performed by: INTERNAL MEDICINE

## 2023-10-18 PROCEDURE — 36415 COLL VENOUS BLD VENIPUNCTURE: CPT

## 2023-10-18 PROCEDURE — 2580000003 HC RX 258

## 2023-10-18 PROCEDURE — 6370000000 HC RX 637 (ALT 250 FOR IP): Performed by: STUDENT IN AN ORGANIZED HEALTH CARE EDUCATION/TRAINING PROGRAM

## 2023-10-18 PROCEDURE — 94640 AIRWAY INHALATION TREATMENT: CPT

## 2023-10-18 PROCEDURE — 6370000000 HC RX 637 (ALT 250 FOR IP)

## 2023-10-18 PROCEDURE — 85027 COMPLETE CBC AUTOMATED: CPT

## 2023-10-18 PROCEDURE — 94761 N-INVAS EAR/PLS OXIMETRY MLT: CPT

## 2023-10-18 PROCEDURE — 80048 BASIC METABOLIC PNL TOTAL CA: CPT

## 2023-10-18 PROCEDURE — 6360000002 HC RX W HCPCS

## 2023-10-18 RX ORDER — ASPIRIN 81 MG/1
81 TABLET, CHEWABLE ORAL DAILY
Qty: 90 TABLET | Refills: 3 | Status: SHIPPED | OUTPATIENT
Start: 2023-10-18

## 2023-10-18 RX ADMIN — ACETAMINOPHEN 650 MG: 325 TABLET ORAL at 08:37

## 2023-10-18 RX ADMIN — SERTRALINE HYDROCHLORIDE 100 MG: 100 TABLET, FILM COATED ORAL at 08:37

## 2023-10-18 RX ADMIN — ARFORMOTEROL TARTRATE 15 MCG: 15 SOLUTION RESPIRATORY (INHALATION) at 09:21

## 2023-10-18 RX ADMIN — TIOTROPIUM BROMIDE INHALATION SPRAY 2 PUFF: 3.12 SPRAY, METERED RESPIRATORY (INHALATION) at 09:21

## 2023-10-18 RX ADMIN — BUDESONIDE INHALATION 500 MCG: 0.5 SUSPENSION RESPIRATORY (INHALATION) at 09:21

## 2023-10-18 RX ADMIN — SODIUM CHLORIDE, PRESERVATIVE FREE 10 ML: 5 INJECTION INTRAVENOUS at 08:38

## 2023-10-18 RX ADMIN — ASPIRIN 81 MG: 81 TABLET, CHEWABLE ORAL at 08:37

## 2023-10-18 ASSESSMENT — ENCOUNTER SYMPTOMS
SHORTNESS OF BREATH: 0
EYE REDNESS: 0
SORE THROAT: 0
WHEEZING: 0
CHEST TIGHTNESS: 0
ABDOMINAL PAIN: 0
BACK PAIN: 0
RHINORRHEA: 0

## 2023-10-18 ASSESSMENT — PAIN SCALES - GENERAL: PAINLEVEL_OUTOF10: 3

## 2023-10-18 NOTE — PROGRESS NOTES
NEUROSURGERY PROGRESS NOTE    10/18/2023 11:01 AM                               Bibi L Kobe                      LOS: 2 days     Subjective: Patient sitting up in bed upon entering the room. No acute events overnight. Patient is neurologically stable. Physical Exam:  Patient seen and examined    Vitals:    10/18/23 1000   BP: (!) 128/101   Pulse: 70   Resp: 16   Temp:    SpO2: 96%     GCS:  4 - Opens eyes on own  5 - Alert and oriented. Fully conversant. 6 - Follows complex motor commands  General: Well developed. Alert and cooperative in no acute distress. HENT: atraumatic, neck supple. Left neck transverse incision is well healed. Eyes: Optic discs: Not tested  Pulmonary: unlabored respiratory effort  Cardiovascular:  Warm well perfused. No peripheral edema  Gastrointestinal: abdomen soft, NT, ND    Neurological:  Mental Status: Awake, alert, oriented x 4, speech clear and appropriate  Attention: Intact  Language: Speaks in full sentences without delay, has appropriate, prolonged conversation - no expressive aphasia today  Sensation: Intact to all extremities to light touch  Coordination: Intact    Cranial Nerves:  II: Visual acuity not tested, denies new visual changes / diplopia  III, IV, VI: PERRL, 3 mm bilaterally, EOMI, no nystagmus noted  V: Facial sensation intact bilaterally to touch  VII: Face symmetric  VIII: Hearing intact bilaterally to spoken voice  IX: Palate movement equal bilaterally  XI: Shoulder shrug equal bilaterally  XII: Tongue midline    Musculoskeletal:   Gait: Not tested   Assist devices: None   Tone: Normal  Motor strength: 5/5 x4    Radiological Findings:  CT BRAIN PERFUSION  Result Date: 10/17/2023  CT perfusion scan demonstrates no evidence of focal hyperperfusion or ischemic core infarct in the brain.        Labs:  Recent Labs     10/18/23  0426   WBC 10.6   HGB 13.7   HCT 40.1            Recent Labs     10/16/23  1825 10/17/23  0712 10/18/23  0426      < > Neurocritical Care following, appreciate recs  Mobility: Advance as tolerates  Diet: Advance as tolerates  DVT Prophylaxis: SCD's & OK for chemoprophylaxis, if medically indicated  Will follow inpatient. Please call with any questions or decline in neurological status    DISPO: OK to DC from neurosurgery standpoint. Dispo timing to be determined by primary team once patient is medically stable for discharge. Exam is much improved. Review of brain CT perfusion scan reveals no remarkable perfusion deficit. Subtle left Tmax deficit but the standard 6 second Tmax was normal. MRI brain without ischemia. Possibly her presenting symptoms were transient after a hypotensive episode. The timing of her left ICA occlusion is unclear, but interesting she did have an outpatient CTA 10/16 AM (done at [facility], ordered a week prior to obtain a new baseline) at which point she was asymptomatic. That did reveal apparent occlusion of the left cervical ICA. Sometime later that day, she became symptomatic. So there was apparently some period of time, unclear for how long, that her left ICA had become occluded but she was not symptomatic. Later on 10/16 she became symptomatic with aphasia and L weakness, and presented to [facility]. Catheter angiography revealed occlusion of the left ICA origin with strong ECA to ICA collaterals. Her symptoms have resolved. She can discharge home assuming physical and occupational therapy evaluations agree. She should continue asa 81 daily. Avoid anti-hypertensives (which she has not been on). She understands this. I will see her in my office in about 4 weeks to monitor her.     Roxann De La Rosa MD, PhD  Endovascular Neurosurgery  [address] [facility]  [phone] (office direct line)  [phone] ([facility])

## 2023-10-18 NOTE — CARE COORDINATION
Case Management Assessment            Discharge Note                    Date / Time of Note: 10/18/2023 11:42 AM                  Discharge Note Completed by: Olga Morales RN    Patient Name: Loretta Max   YOB: 1970  Diagnosis: Acute cerebrovascular accident (CVA) [facility]) [I63.9]   Date / Time: 10/16/2023  9:07 PM    Current PCP: DEANNA Gallego    Hospitalization in the last 30 days: Yes  Readmission Assessment  Number of Days since last admission?: 8-30 days  Previous Disposition: Home with Family  Who is being Malcolm Timmonsard: Patient (chart review)  What was the patient's/caregiver's perception as to why they think they needed to return back to the hospital?: Other (Comment) (sudden onset of confuion and trouble speaking)  Did you visit your Primary Care Physician after you left the hospital, before you returned this time?: No  Why weren't you able to visit your PCP?: Did not have an appointment  Did you see a specialist, such as Cardiac, Pulmonary, Orthopedic Physician, etc. after you left the hospital?: Yes  Who advised the patient to return to the hospital?: Self-referral  Does the patient report anything that got in the way of taking their medications?: No  In our efforts to provide the best possible care to you and others like you, can you think of anything that we could have done to help you after you left the hospital the first time, so that you might not have needed to return so soon?: Identify patient's health literacy needs, Teaching during hospitalization regarding your illness, Teach back instructions regarding management of illness    Advance Directives:  Code Status: Full Code    Financial:  Payor: [place] / Plan: Maggie Abreu [place] / Product Type: *No Product type* /      Pharmacy:    [facility] [id number] - [address] [place] [address] Anmol Branhammirza [phone]  [address] [place] [address]  Phone: [phone] Fax:

## 2023-10-18 NOTE — PROGRESS NOTES
Discharge instructions provided to patient and all questions answered. Awaiting on patient ride to arrive.

## 2023-10-18 NOTE — PLAN OF CARE
Problem: Discharge Planning  Goal: Discharge to home or other facility with appropriate resources  Outcome: Adequate for Discharge     Problem: Skin/Tissue Integrity  Goal: Absence of new skin breakdown  Description: 1. Monitor for areas of redness and/or skin breakdown  2. Assess vascular access sites hourly  3. Every 4-6 hours minimum:  Change oxygen saturation probe site  4. Every 4-6 hours:  If on nasal continuous positive airway pressure, respiratory therapy assess nares and determine need for appliance change or resting period.   Outcome: Adequate for Discharge     Problem: Safety - Adult  Goal: Free from fall injury  Outcome: Adequate for Discharge     Problem: ABCDS Injury Assessment  Goal: Absence of physical injury  Outcome: Adequate for Discharge     Problem: Pain  Goal: Verbalizes/displays adequate comfort level or baseline comfort level  Outcome: Adequate for Discharge     Problem: Neurosensory - Adult  Goal: Achieves stable or improved neurological status  Outcome: Adequate for Discharge     Problem: Neurosensory - Adult  Goal: Achieves maximal functionality and self care  Outcome: Adequate for Discharge

## 2023-10-18 NOTE — PROGRESS NOTES
Fluctuating mobility between assessments. Able to ambulate with standby assist/walker to bathroom. Left side appears functional for mobility and eating/drinking. Slight weakness when completing neuro assessments only. Will continue to monitor pt. Neuro CC aware.

## 2023-10-18 NOTE — PROGRESS NOTES
Physical Therapy  [facility]    Chart reviewed. New PT orders received, patient evaluated yesterday 10/17 by PT, recommended home PT and RW. Discussed with RN, patient planning to d/c home today. No change in status. No new PT evaluation indicated at this time. Plan to acknowledge and complete orders without additional evaluation. Plan to continue per previously established plan of care.     Karly Esparza PT, DPT [id number]

## 2023-10-18 NOTE — PROGRESS NOTES
Speech Language Pathology  Facility/Department:[facility] ICU  Dysphagia Treatment/Dc  Speech/Language/Cognitive Treatment  Name: Paige Moritz  : 1970  MRN: [id number]                                                       Patient Diagnosis(es):   Patient Active Problem List    Diagnosis Date Noted    Left arm weakness 10/17/2023    Stuttering 10/17/2023    Left carotid artery occlusion 10/16/2023    TIA (transient ischemic attack) 2023    Carotid artery stenosis, symptomatic, left 2023    Anxiety 2023    Pure hypercholesterolemia 07/10/2023    Internal carotid artery stenosis, left 2023    COPD exacerbation ([address]) 2023    COPD (chronic obstructive pulmonary disease) ([address]) 2023    Pre-syncope 2023    Palpitations 2023    Arthritis of right knee 2018    Chronic pain of right knee 2018    Hypothyroidism 2018    Prediabetes 2018    Depression 2018    RLS (restless legs syndrome) 2018    Other hyperlipidemia 2018    Sensorineural hearing loss, bilateral 2016    Asthma 2014    Tobacco abuse 2014       Past Medical History:   Diagnosis Date    Anxiety     Asthma     COPD (chronic obstructive pulmonary disease) ([address])     Depression     Endometriosis     Knee pain      Past Surgical History:   Procedure Laterality Date    CAROTID ENDARTERECTOMY Left 2023    LEFT CAROTID ENDARTERECTOMY performed by Lon Small MD at [address] (CERVIX STATUS UNKNOWN)      INCONTINENCE SURGERY  2017    KNEE ARTHROSCOPY Right     2012    TONSILLECTOMY         Reason for Referral:  Paige Moritz  was referred for a Speech Therapy evaluation to assess swallow function and/or communication.     History of Present Illness  Per MD notes:  Paige Moritz is a 48 y.o. female who presents to the emergency department secondary to concern for aphasia. Patient states that she felt normal last night, but did not feel normal upon awakening this morning, but does not report time this was. Patient did have a CT angiogram of her neck done at approximately 11:30 AM (this was done as patient has a history of left carotid occlusion. Per patient's sister at bedside, this was the last time the patient was objectively normal to the sister when she picked her up from the CT facility. Patient currently not on any blood thinners, but does take daily aspirin. Past medical history noted below. Aside from what is stated above denies any other symptoms or modifying factors. reports that she has been smoking cigarettes. She started smoking about 39 years ago. She has a 17.50 pack-year smoking history. She has never used smokeless tobacco. She reports that she does not drink alcohol and does not use drugs. Nursing Notes were all reviewed and agreed with or any disagreements addressed in HPI/MDM. Imaging  CT BRAIN PERFUSION   Final Result      1. CT perfusion scan demonstrates no evidence of focal hyperperfusion or   ischemic core infarct in the brain. XR ABDOMEN (KUB) (SINGLE AP VIEW)   Final Result      Nonobstructive bowel gas pattern. Electronically signed by Armani Hood MD      MRI BRAIN WO CONTRAST   Final Result      1. No acute intracranial hemorrhage, mass or acute ischemia. 2. Also normal flow void in the left internal carotid artery again seen in keeping with previously described CTA findings. 3. Stable focal chronic infarction left frontal lobe and mild burden of white matter T2 FLAIR hyperintensity compatible with chronic small vessel ischemia. Electronically signed by Armani Hood MD      [facility] CAROTID CEREBRAL RIGHT    (Results Pending)       Date of onset: 10/16/23    Current Diet:  ADULT DIET;  Regular; Low Fat/Low Chol/High Fiber/CHELSEA      Treatment Diagnosis: aphasia     Pain:  Pt reported back pain of 8

## 2023-10-18 NOTE — PROGRESS NOTES
ICU Progress Note    Admit Date: 10/16/2023  Hospital Day: 2  ICU Day: 1d 11h  Vent Day: None  IV Access: Peripheral  IV Fluids: None  Vasopressors: None  Antibiotics: None  Diet: ADULT DIET; Regular; Low Fat/Low Chol/High Fiber/CHELSEA    CC: No chief complaint on file. Interval history:  The patient desatted to the 80s overnight while on RA. She was placed on 2L but the saturation did not improve so she was placed on 4L. Currently back to RA. The patient feels well this morning and reports that her speech has greatly improved. Her back pain from yesterday has resolved. She has been able to ambulate to the bathroom without difficulty and has not had trouble urinating. HPI:  Darren Rubio is a 49 yo female with a medical history of left frontal CVA (09/18/2023) s/p left carotid endarterectomy on 09/22/2023 with no residual deficits, COPD, depression, current every day smoker who presented to [facility] ED with aphasia and left-sided hemiparesis. Patient states she noticed she was having some difficulty remembering where to go for her medical appointment this morning, was confused about where she was, last known well around 11 AM. She reports when she went home after her appointment she began having difficulties with her speech, stuttering and having some difficulty with word finding. Her stuttering progressively worsened, and she began to feel weak in her left upper and lower extremity around 4 PM, after which her family convinced her to go to the ER. She denies feeling any dizziness, lightheadedness, syncope, falls, headache, chest pain, palpitations, swelling in extremities, shortness of breath, nausea, vomiting, abdominal pain, constipation, diarrhea, difficulty urinating, hematuria.      She was recently hospitalized in September with intermittent dizziness with peripheral vision lost in her left eye and was found to have an acute left frontal cortex infarct on MRI and an occluded left Faisal  10/18/2023, 8:13 AM    This patient has been staffed and discussed with Dr. Rufina Williamson. Pulmonary & Critical Care     CT brain perfusion yesterday afternoon showed no hypoperfusion or infarct. Her stuttering and left-sided weakness has resolved  Recommendation was for blood pressure to run higher around 688-636 systolic  She is okay to discharge home.      Rufina Williamson MD

## 2023-10-18 NOTE — PLAN OF CARE
Briefly, patient is a 48year old Female with a history of a recent small Left MCA stroke secondary to Left ICA occlusion s/p CEA on 9/22 who now presented with acute onset expressive aphasia and left sided weakness yesterday. There was initial concern for reocclusion of her Left ICA and possible new Right m2 occlusion on CTA. Thus, she was trasnported here to [facility] for emergent dignostic angiogram and possible thrombectomy with Dr. Cheryl Mckinney. She is s/p catheter angiogram on 10/16 with Dr. Cheryl Mckinney, which showed that the Left ICA is now occluded but with robust collateral flow from the branches of the left external carotid artery and to the rest of the Left MCA. No intracranial LVO. No intervention took place. No new stroke on MRI. CTP showed no evidence of focal hypoperfusion or ischemic core infarct in the brain. Currently on examination, patient reports she is feeling better. States that she is no longer stuttering like she was earlier today. She denies any pain. States that she wakes up with headaches rated 3/10 at the moment, but states it can get as bad as 5/10 on the left side. She also endorses intermittent dizziness from time to time with ambulation. However, patient currently denies dizziness, lightheadedness, nausea, vomiting, photosensitivity. States that she has chronic numbness and tingling in her feet secondary to neuropathy but nothing new or acute. She denies dysphagia and dysarthria. Patient did report that she needed to go to the restroom, and was assisted by myself to the bathroom. Her gait was strong and steady with the support of a walker. She had no ataxia appreciated. Exam as noted below. She is strong in her right upper extremity and right lower extremity.   Her left upper extremity was able to maintain against gravity for 10 seconds before it drastically dropped to the bed with give away weakness  She stated that she was having a hard time with biceps and triceps flexion and without ataxia  Tone: normal in all 4 extremities  Gait: Strong and steady with walker    OTHER SYSTEMS:  Cardiovascular: Warm, appears well perfused   Respiratory: Easy, non-labored respiratory pattern   Abdominal: Abdomen is without distention   Extremities: Upper and lower extremities are atraumatic in appearance without deformity. No swelling or erythema.

## 2023-10-18 NOTE — PROGRESS NOTES
Pt saturating in the 80's on room air. Applied 2L NC but sats did not come back up. Bumped up to 4L. Effective - saturations mid 90's.

## 2023-10-18 NOTE — PLAN OF CARE
Problem: Discharge Planning  Goal: Discharge to home or other facility with appropriate resources  Outcome: Progressing  Flowsheets (Taken 10/17/2023 2000)  Discharge to home or other facility with appropriate resources:   Identify barriers to discharge with patient and caregiver   Identify discharge learning needs (meds, wound care, etc)     Problem: Skin/Tissue Integrity  Goal: Absence of new skin breakdown  Description: 1. Monitor for areas of redness and/or skin breakdown  2. Assess vascular access sites hourly  3. Every 4-6 hours minimum:  Change oxygen saturation probe site  4. Every 4-6 hours:  If on nasal continuous positive airway pressure, respiratory therapy assess nares and determine need for appliance change or resting period.   Outcome: Progressing     Problem: Safety - Adult  Goal: Free from fall injury  Outcome: Progressing  Flowsheets (Taken 10/17/2023 2000)  Free From Fall Injury: Instruct family/caregiver on patient safety     Problem: ABCDS Injury Assessment  Goal: Absence of physical injury  Outcome: Progressing  Flowsheets (Taken 10/17/2023 2000)  Absence of Physical Injury: Implement safety measures based on patient assessment     Problem: Pain  Goal: Verbalizes/displays adequate comfort level or baseline comfort level  Outcome: Progressing  Flowsheets (Taken 10/17/2023 2000)  Verbalizes/displays adequate comfort level or baseline comfort level:   Encourage patient to monitor pain and request assistance   Assess pain using appropriate pain scale   Administer analgesics based on type and severity of pain and evaluate response   Implement non-pharmacological measures as appropriate and evaluate response     Problem: Chronic Conditions and Co-morbidities  Goal: Patient's chronic conditions and co-morbidity symptoms are monitored and maintained or improved  Outcome: Progressing  Flowsheets (Taken 10/17/2023 2000)  Care Plan - Patient's Chronic Conditions and Co-Morbidity Symptoms are

## 2023-10-20 ENCOUNTER — TELEPHONE (OUTPATIENT)
Dept: INTERNAL MEDICINE CLINIC | Age: 53
End: 2023-10-20

## 2023-10-20 NOTE — TELEPHONE ENCOUNTER
Care Transitions Initial Follow Up Call    Outreach made within 2 business days of discharge: Yes    Patient: Lisbeth Mcpherson Patient : 1970   MRN: [id number]  Reason for Admission: There are no discharge diagnoses documented for the most recent discharge. Discharge Date: 10/18/23       Spoke with: Number is not valid    Discharge department/facility: [facility]     TCM Interactive Patient Contact:  Was patient able to fill all prescriptions: No: Number is not valid  Was patient instructed to bring all medications to the follow-up visit: No: Number is not valid  Is patient taking all medications as directed in the discharge summary?  Number is not valid  Does patient understand their discharge instructions: No: Number is not valid  Does patient have questions or concerns that need addressed prior to 7-14 day follow up office visit: no    Scheduled appointment with PCP within 7-14 days    Follow Up  Future Appointments   Date Time Provider [address] [place] [address]   10/26/2023  9:20 AM Genevieve Peralta MD [facility]   2023  1:00 PM Yoselin Steele, [address] [place] [address]

## 2023-10-21 NOTE — PROGRESS NOTES
Physician Progress Note      PATIENT:               Kaylee Barboza  CSN #:                  [id number]  :                       1970  ADMIT DATE:       10/16/2023 9:07 PM  [address] [facility] ER DATE:        10/18/2023 1:15 PM  RESPONDING  PROVIDER #:        Aracely Vaughan MD          QUERY TEXT:    Pt admitted with Hemiplegia and Aphasia. If possible, please document in   progress notes and discharge summary the relationship, if any, between the   following. The medical record reflects the following:  Risk Factors: 49 y/o female  Clinical Indicators: MRI: Stable 1 cm chronic cortical infarct in the left   frontal lobe. Otherwise mild burden of foci of T2 FLAIR hyperintensity within   the deep and periventricular white matter compatible  with chronic small vessel ischemia. H&P:\" medical history of left frontal CVA   (2023) s/p left carotid endarterectomy on 2023. \"  Per neurosurgery   consult: Possibly her presenting symptoms were transient after a hypotensive   episode. The timing of her left ICA occlusion is unclear, but interesting she   did have an outpatient CTA 10/16 AM (done at [facility], ordered a week prior   to obtain a new baseline) at which point she was asymptomatic. That did reveal   apparent occlusion of the left cervical ICA. Sometime later that day, she   became symptomatic. So there was appare  Treatment: Neurosurgery, MRI, Continue ASA 81 mg,  Permissive hypertension   (-150). Avoid BP meds over the next year and avoid dehydration. Options provided:  -- Hemiplegia and aphasia likely sequela of previous CVA  -- Hemiplegia and aphasia likely due to Left ICA occlusion, now resolved  -- Hemiplegia and aphasia due to other, please specify likely cause, please   specify likely cause.   -- Other - I will add my own diagnosis  -- Disagree - Not applicable / Not valid  -- Disagree - Clinically unable to determine / Unknown  -- Refer to Clinical Documentation Reviewer    PROVIDER RESPONSE

## 2023-10-26 ENCOUNTER — OFFICE VISIT (OUTPATIENT)
Dept: PULMONOLOGY | Age: 53
End: 2023-10-26
Payer: MEDICAID

## 2023-10-26 VITALS
HEART RATE: 66 BPM | WEIGHT: 234.2 LBS | HEIGHT: 66 IN | RESPIRATION RATE: 18 BRPM | DIASTOLIC BLOOD PRESSURE: 70 MMHG | BODY MASS INDEX: 37.64 KG/M2 | TEMPERATURE: 96.9 F | OXYGEN SATURATION: 97 % | SYSTOLIC BLOOD PRESSURE: 124 MMHG

## 2023-10-26 DIAGNOSIS — J44.9 CHRONIC OBSTRUCTIVE PULMONARY DISEASE, UNSPECIFIED COPD TYPE (HCC): Primary | ICD-10-CM

## 2023-10-26 DIAGNOSIS — J44.9 CHRONIC OBSTRUCTIVE PULMONARY DISEASE, UNSPECIFIED COPD TYPE (HCC): ICD-10-CM

## 2023-10-26 DIAGNOSIS — Z72.0 TOBACCO ABUSE: ICD-10-CM

## 2023-10-26 PROCEDURE — 99204 OFFICE O/P NEW MOD 45 MIN: CPT | Performed by: INTERNAL MEDICINE

## 2023-10-26 PROCEDURE — 99406 BEHAV CHNG SMOKING 3-10 MIN: CPT | Performed by: INTERNAL MEDICINE

## 2023-10-26 ASSESSMENT — ENCOUNTER SYMPTOMS
WHEEZING: 0
COUGH: 1
SHORTNESS OF BREATH: 1
CHEST TIGHTNESS: 0

## 2023-10-26 NOTE — ASSESSMENT & PLAN NOTE
Unclear control, continue current medications pending work up below  -Needs respiratory allergy profile checked, she will reach out to her insurance to determine formulary alternatives and let us know  -Continue albuterol as needed  -PFTs with moderate airflow obstruction with robust bronchodilator response indicates there could be a degree of asthma COPD overlap syndrome.  -Needs to quit smoking

## 2023-10-26 NOTE — ASSESSMENT & PLAN NOTE
-Current everyday 40-pack-year smoker  -Eligible for LDCT, had CT of the chest that was unremarkable this year will be due 6/2024  -Greater than 3 minutes spent counseling patient on the harms of tobacco abuse. We also discussed smoking cessation and smoking cessation aids. Patient is moderately motivated to quit and is actively cutting back.

## 2023-10-26 NOTE — PROGRESS NOTES
states that she thinks she has more flareups that are reported. She has never had respiratory allergy profile performed. Eosinophils of 300 on CBC. Review of Systems   Constitutional: Negative. Respiratory:  Positive for cough and shortness of breath. Negative for chest tightness and wheezing. Cardiovascular: Negative. Objective   Physical Exam     Gen:  No acute distress. Eyes: EOMI. Anicteric sclera. No conjunctival injection. ENT: No discharge. External appearance of ears and nose normal.  Neck: Trachea midline. No mass   Resp:  No crackles. No wheezes. No rhonchi. No dullness on percussion. CV: Regular rate. Regular rhythm. No murmur or rub. No edema. Neuro:  no focal neurologic deficit. Moves all extremities  Psych: Awake and alert, Oriented x 3. Judgement and insight appropriate. Mood stable. CT chest images reviewed personally by me, interpretation as follows:  -Background motion artifact obscures fine detail but otherwise no acute airspace process noted bilaterally. An electronic signature was used to authenticate this note.     --Jace Schlatter, MD

## 2023-11-06 LAB
A ALTERNATA IGE QN: <0.1 KU/L (ref 0–0.34)
A FUMIGATUS IGE QN: <0.1 KU/L (ref 0–0.34)
AMER SYCAMORE IGE QN: <0.1 KU/L (ref 0–0.34)
BERMUDA GRASS IGE QN: <0.1 KU/L (ref 0–0.34)
BOXELDER IGE QN: <0.1 KU/L (ref 0–0.34)
C SPHAEROSPERMUM IGE QN: <0.1 KUL/L (ref 0–0.34)
CALIF WALNUT IGE QN: <0.1 KU/L (ref 0–0.34)
CAT DANDER IGE QN: <0.1 KU/L (ref 0–0.34)
CMN PIGWEED IGE QN: <0.1 KU/L (ref 0–0.34)
COMMON RAGWEED IGE QN: <0.1 KU/L (ref 0–0.34)
COTTONWOOD IGE QN: <0.1 KU/L (ref 0–0.34)
D FARINAE IGE QN: <0.1 KU/L (ref 0–0.34)
D PTERONYSS IGE QN: <0.1 KU/L (ref 0–0.34)
DOG DANDER IGE QN: <0.1 KU/L (ref 0–0.34)
IGE SERPL-ACNC: 161 IU/ML
M RACEMOSUS IGE QN: <0.1 KU/L (ref 0–0.34)
MOUSE EPITH IGE QN: <0.1 KU/L (ref 0–0.34)
P NOTATUM IGE QN: <0.1 KU/L (ref 0–0.34)
PECAN/HICK TREE IGE QN: <0.1 KU/L (ref 0–0.34)
RED CEDAR IGE QN: <0.1 KU/L (ref 0–0.34)
ROACH IGE QN: <0.1 KU/L (ref 0–0.34)
SALTWORT IGE QN: <0.1 KU/L (ref 0–0.34)
SHEEP SORREL IGE QN: <0.1 KU/L (ref 0–0.34)
SILVER BIRCH IGE QN: <0.1 KU/L (ref 0–0.34)
TIMOTHY IGE QN: <0.1 KU/L (ref 0–0.34)
WHITE ASH IGE QN: <0.1 KU/L (ref 0–0.34)
WHITE ELM IGE QN: <0.1 KU/L (ref 0–0.34)
WHITE MULBERRY IGE QN: <0.1 KU/L (ref 0–0.34)
WHITE OAK IGE QN: <0.1 KU/L (ref 0–0.34)

## 2023-11-08 ENCOUNTER — TELEPHONE (OUTPATIENT)
Dept: PULMONOLOGY | Age: 53
End: 2023-11-08

## 2024-01-09 ENCOUNTER — APPOINTMENT (OUTPATIENT)
Dept: GENERAL RADIOLOGY | Age: 54
DRG: 140 | End: 2024-01-09
Payer: MEDICAID

## 2024-01-09 ENCOUNTER — HOSPITAL ENCOUNTER (INPATIENT)
Age: 54
LOS: 5 days | Discharge: HOME | DRG: 140 | End: 2024-01-14
Attending: EMERGENCY MEDICINE | Admitting: FAMILY MEDICINE
Payer: MEDICAID

## 2024-01-09 DIAGNOSIS — R09.02 HYPOXEMIA REQUIRING SUPPLEMENTAL OXYGEN: ICD-10-CM

## 2024-01-09 DIAGNOSIS — F17.200 SMOKER: ICD-10-CM

## 2024-01-09 DIAGNOSIS — J44.1 COPD EXACERBATION (HCC): ICD-10-CM

## 2024-01-09 DIAGNOSIS — J44.9 CHRONIC OBSTRUCTIVE PULMONARY DISEASE WITH HYPOXIA (HCC): Primary | ICD-10-CM

## 2024-01-09 DIAGNOSIS — Z99.81 HYPOXEMIA REQUIRING SUPPLEMENTAL OXYGEN: ICD-10-CM

## 2024-01-09 DIAGNOSIS — R09.02 CHRONIC OBSTRUCTIVE PULMONARY DISEASE WITH HYPOXIA (HCC): Primary | ICD-10-CM

## 2024-01-09 LAB
ALBUMIN SERPL-MCNC: 4.5 G/DL (ref 3.4–5)
ALBUMIN/GLOB SERPL: 1.2 {RATIO} (ref 1.1–2.2)
ALP SERPL-CCNC: 104 U/L (ref 40–129)
ALT SERPL-CCNC: 16 U/L (ref 10–40)
ANION GAP SERPL CALCULATED.3IONS-SCNC: 10 MMOL/L (ref 3–16)
AST SERPL-CCNC: 16 U/L (ref 15–37)
BASE EXCESS BLDV CALC-SCNC: 2.8 MMOL/L (ref -3–3)
BASOPHILS # BLD: 0.2 K/UL (ref 0–0.2)
BASOPHILS NFR BLD: 1.3 %
BILIRUB SERPL-MCNC: <0.2 MG/DL (ref 0–1)
BUN SERPL-MCNC: 9 MG/DL (ref 7–20)
CALCIUM SERPL-MCNC: 9.8 MG/DL (ref 8.3–10.6)
CHLORIDE SERPL-SCNC: 103 MMOL/L (ref 99–110)
CO2 BLDV-SCNC: 27 MMOL/L
CO2 SERPL-SCNC: 30 MMOL/L (ref 21–32)
CREAT SERPL-MCNC: 0.6 MG/DL (ref 0.6–1.1)
DEPRECATED RDW RBC AUTO: 14.2 % (ref 12.4–15.4)
EOSINOPHIL # BLD: 0.4 K/UL (ref 0–0.6)
EOSINOPHIL NFR BLD: 2.8 %
FLUAV RNA UPPER RESP QL NAA+PROBE: NEGATIVE
FLUBV AG NPH QL: NEGATIVE
GFR SERPLBLD CREATININE-BSD FMLA CKD-EPI: >60 ML/MIN/{1.73_M2}
GLUCOSE SERPL-MCNC: 100 MG/DL (ref 70–99)
HCG SERPL QL: NEGATIVE
HCO3 BLDV-SCNC: 27.9 MMOL/L (ref 23–29)
HCT VFR BLD AUTO: 46.2 % (ref 36–48)
HGB BLD-MCNC: 15.2 G/DL (ref 12–16)
LYMPHOCYTES # BLD: 3.9 K/UL (ref 1–5.1)
LYMPHOCYTES NFR BLD: 29 %
MCH RBC QN AUTO: 29.2 PG (ref 26–34)
MCHC RBC AUTO-ENTMCNC: 32.9 G/DL (ref 31–36)
MCV RBC AUTO: 88.6 FL (ref 80–100)
MONOCYTES # BLD: 1.1 K/UL (ref 0–1.3)
MONOCYTES NFR BLD: 7.7 %
NEUTROPHILS # BLD: 8 K/UL (ref 1.7–7.7)
NEUTROPHILS NFR BLD: 59.2 %
NT-PROBNP SERPL-MCNC: 113 PG/ML (ref 0–124)
O2 THERAPY: ABNORMAL
PCO2 BLDV: 47.2 MMHG (ref 40–50)
PH BLDV: 7.38 [PH] (ref 7.35–7.45)
PLATELET # BLD AUTO: 375 K/UL (ref 135–450)
PMV BLD AUTO: 7.6 FL (ref 5–10.5)
PO2 BLDV: 62.2 MMHG (ref 25–40)
POTASSIUM SERPL-SCNC: 3.8 MMOL/L (ref 3.5–5.1)
PROT SERPL-MCNC: 8.2 G/DL (ref 6.4–8.2)
RBC # BLD AUTO: 5.21 M/UL (ref 4–5.2)
SAO2 % BLDV: 91 %
SARS-COV-2 RDRP RESP QL NAA+PROBE: NOT DETECTED
SODIUM SERPL-SCNC: 143 MMOL/L (ref 136–145)
TROPONIN, HIGH SENSITIVITY: <6 NG/L (ref 0–14)
WBC # BLD AUTO: 13.6 K/UL (ref 4–11)

## 2024-01-09 PROCEDURE — 87804 INFLUENZA ASSAY W/OPTIC: CPT

## 2024-01-09 PROCEDURE — 87635 SARS-COV-2 COVID-19 AMP PRB: CPT

## 2024-01-09 PROCEDURE — 82803 BLOOD GASES ANY COMBINATION: CPT

## 2024-01-09 PROCEDURE — 85025 COMPLETE CBC W/AUTO DIFF WBC: CPT

## 2024-01-09 PROCEDURE — 83880 ASSAY OF NATRIURETIC PEPTIDE: CPT

## 2024-01-09 PROCEDURE — 80053 COMPREHEN METABOLIC PANEL: CPT

## 2024-01-09 PROCEDURE — 96365 THER/PROPH/DIAG IV INF INIT: CPT

## 2024-01-09 PROCEDURE — 99285 EMERGENCY DEPT VISIT HI MDM: CPT

## 2024-01-09 PROCEDURE — 6370000000 HC RX 637 (ALT 250 FOR IP): Performed by: EMERGENCY MEDICINE

## 2024-01-09 PROCEDURE — 36415 COLL VENOUS BLD VENIPUNCTURE: CPT

## 2024-01-09 PROCEDURE — 1200000000 HC SEMI PRIVATE

## 2024-01-09 PROCEDURE — 6370000000 HC RX 637 (ALT 250 FOR IP): Performed by: PHYSICIAN ASSISTANT

## 2024-01-09 PROCEDURE — 96375 TX/PRO/DX INJ NEW DRUG ADDON: CPT

## 2024-01-09 PROCEDURE — 84703 CHORIONIC GONADOTROPIN ASSAY: CPT

## 2024-01-09 PROCEDURE — 84484 ASSAY OF TROPONIN QUANT: CPT

## 2024-01-09 PROCEDURE — 6360000002 HC RX W HCPCS: Performed by: PHYSICIAN ASSISTANT

## 2024-01-09 PROCEDURE — 71045 X-RAY EXAM CHEST 1 VIEW: CPT

## 2024-01-09 RX ORDER — ALBUTEROL SULFATE 2.5 MG/3ML
5 SOLUTION RESPIRATORY (INHALATION) ONCE
Status: COMPLETED | OUTPATIENT
Start: 2024-01-09 | End: 2024-01-09

## 2024-01-09 RX ORDER — GUAIFENESIN/DEXTROMETHORPHAN 100-10MG/5
10 SYRUP ORAL ONCE
Status: COMPLETED | OUTPATIENT
Start: 2024-01-09 | End: 2024-01-09

## 2024-01-09 RX ORDER — MAGNESIUM SULFATE IN WATER 40 MG/ML
2000 INJECTION, SOLUTION INTRAVENOUS ONCE
Status: COMPLETED | OUTPATIENT
Start: 2024-01-09 | End: 2024-01-09

## 2024-01-09 RX ORDER — IPRATROPIUM BROMIDE AND ALBUTEROL SULFATE 2.5; .5 MG/3ML; MG/3ML
1 SOLUTION RESPIRATORY (INHALATION)
Status: DISCONTINUED | OUTPATIENT
Start: 2024-01-10 | End: 2024-01-09

## 2024-01-09 RX ORDER — METHYLPREDNISOLONE SODIUM SUCCINATE 125 MG/2ML
125 INJECTION, POWDER, LYOPHILIZED, FOR SOLUTION INTRAMUSCULAR; INTRAVENOUS ONCE
Status: COMPLETED | OUTPATIENT
Start: 2024-01-09 | End: 2024-01-09

## 2024-01-09 RX ORDER — AZITHROMYCIN 500 MG/1
500 TABLET, FILM COATED ORAL ONCE
Status: COMPLETED | OUTPATIENT
Start: 2024-01-09 | End: 2024-01-09

## 2024-01-09 RX ORDER — IPRATROPIUM BROMIDE AND ALBUTEROL SULFATE 2.5; .5 MG/3ML; MG/3ML
2 SOLUTION RESPIRATORY (INHALATION) ONCE
Status: COMPLETED | OUTPATIENT
Start: 2024-01-09 | End: 2024-01-09

## 2024-01-09 RX ORDER — ALBUTEROL SULFATE 2.5 MG/3ML
2.5 SOLUTION RESPIRATORY (INHALATION) ONCE
Status: COMPLETED | OUTPATIENT
Start: 2024-01-09 | End: 2024-01-09

## 2024-01-09 RX ADMIN — GUAIFENESIN SYRUP AND DEXTROMETHORPHAN 10 ML: 100; 10 SYRUP ORAL at 19:35

## 2024-01-09 RX ADMIN — AZITHROMYCIN 500 MG: 500 TABLET, FILM COATED ORAL at 20:28

## 2024-01-09 RX ADMIN — METHYLPREDNISOLONE SODIUM SUCCINATE 125 MG: 125 INJECTION INTRAMUSCULAR; INTRAVENOUS at 18:59

## 2024-01-09 RX ADMIN — ALBUTEROL SULFATE 2.5 MG: 2.5 SOLUTION RESPIRATORY (INHALATION) at 18:51

## 2024-01-09 RX ADMIN — IPRATROPIUM BROMIDE AND ALBUTEROL SULFATE 2 DOSE: .5; 3 SOLUTION RESPIRATORY (INHALATION) at 18:42

## 2024-01-09 RX ADMIN — MAGNESIUM SULFATE HEPTAHYDRATE 2000 MG: 40 INJECTION, SOLUTION INTRAVENOUS at 19:04

## 2024-01-09 RX ADMIN — ALBUTEROL SULFATE 5 MG: 2.5 SOLUTION RESPIRATORY (INHALATION) at 20:29

## 2024-01-09 ASSESSMENT — PAIN - FUNCTIONAL ASSESSMENT: PAIN_FUNCTIONAL_ASSESSMENT: NONE - DENIES PAIN

## 2024-01-10 ENCOUNTER — TELEPHONE (OUTPATIENT)
Dept: PULMONOLOGY | Age: 54
End: 2024-01-10

## 2024-01-10 DIAGNOSIS — J44.9 CHRONIC OBSTRUCTIVE PULMONARY DISEASE, UNSPECIFIED COPD TYPE (HCC): ICD-10-CM

## 2024-01-10 PROBLEM — E66.01 CLASS 2 SEVERE OBESITY WITH SERIOUS COMORBIDITY IN ADULT (HCC): Status: ACTIVE | Noted: 2024-01-10

## 2024-01-10 PROBLEM — E66.812 CLASS 2 SEVERE OBESITY WITH SERIOUS COMORBIDITY IN ADULT: Status: ACTIVE | Noted: 2024-01-10

## 2024-01-10 PROBLEM — R65.10 SIRS (SYSTEMIC INFLAMMATORY RESPONSE SYNDROME) (HCC): Status: ACTIVE | Noted: 2024-01-10

## 2024-01-10 LAB
ALBUMIN SERPL-MCNC: 4.4 G/DL (ref 3.4–5)
ALBUMIN/GLOB SERPL: 1.3 {RATIO} (ref 1.1–2.2)
ALP SERPL-CCNC: 109 U/L (ref 40–129)
ALT SERPL-CCNC: 14 U/L (ref 10–40)
ANION GAP SERPL CALCULATED.3IONS-SCNC: 8 MMOL/L (ref 3–16)
AST SERPL-CCNC: 14 U/L (ref 15–37)
BASOPHILS # BLD: 0 K/UL (ref 0–0.2)
BASOPHILS NFR BLD: 0.4 %
BILIRUB SERPL-MCNC: <0.2 MG/DL (ref 0–1)
BUN SERPL-MCNC: 12 MG/DL (ref 7–20)
CALCIUM SERPL-MCNC: 9.3 MG/DL (ref 8.3–10.6)
CHLORIDE SERPL-SCNC: 103 MMOL/L (ref 99–110)
CO2 SERPL-SCNC: 28 MMOL/L (ref 21–32)
CREAT SERPL-MCNC: <0.5 MG/DL (ref 0.6–1.1)
DEPRECATED RDW RBC AUTO: 14.3 % (ref 12.4–15.4)
EOSINOPHIL # BLD: 0 K/UL (ref 0–0.6)
EOSINOPHIL NFR BLD: 0 %
GFR SERPLBLD CREATININE-BSD FMLA CKD-EPI: >60 ML/MIN/{1.73_M2}
GLUCOSE SERPL-MCNC: 159 MG/DL (ref 70–99)
HCT VFR BLD AUTO: 46.5 % (ref 36–48)
HGB BLD-MCNC: 15.4 G/DL (ref 12–16)
LYMPHOCYTES # BLD: 1.2 K/UL (ref 1–5.1)
LYMPHOCYTES NFR BLD: 10.4 %
MCH RBC QN AUTO: 29.1 PG (ref 26–34)
MCHC RBC AUTO-ENTMCNC: 33.1 G/DL (ref 31–36)
MCV RBC AUTO: 87.8 FL (ref 80–100)
MONOCYTES # BLD: 0.2 K/UL (ref 0–1.3)
MONOCYTES NFR BLD: 1.8 %
NEUTROPHILS # BLD: 10.4 K/UL (ref 1.7–7.7)
NEUTROPHILS NFR BLD: 87.4 %
PLATELET # BLD AUTO: 394 K/UL (ref 135–450)
PMV BLD AUTO: 8.4 FL (ref 5–10.5)
POTASSIUM SERPL-SCNC: 4.7 MMOL/L (ref 3.5–5.1)
PROT SERPL-MCNC: 7.7 G/DL (ref 6.4–8.2)
RBC # BLD AUTO: 5.29 M/UL (ref 4–5.2)
SODIUM SERPL-SCNC: 139 MMOL/L (ref 136–145)
WBC # BLD AUTO: 11.9 K/UL (ref 4–11)

## 2024-01-10 PROCEDURE — 97530 THERAPEUTIC ACTIVITIES: CPT

## 2024-01-10 PROCEDURE — 94640 AIRWAY INHALATION TREATMENT: CPT

## 2024-01-10 PROCEDURE — 80053 COMPREHEN METABOLIC PANEL: CPT

## 2024-01-10 PROCEDURE — 2580000003 HC RX 258: Performed by: FAMILY MEDICINE

## 2024-01-10 PROCEDURE — 94760 N-INVAS EAR/PLS OXIMETRY 1: CPT

## 2024-01-10 PROCEDURE — 6370000000 HC RX 637 (ALT 250 FOR IP): Performed by: FAMILY MEDICINE

## 2024-01-10 PROCEDURE — 97162 PT EVAL MOD COMPLEX 30 MIN: CPT

## 2024-01-10 PROCEDURE — 6360000002 HC RX W HCPCS: Performed by: FAMILY MEDICINE

## 2024-01-10 PROCEDURE — 85025 COMPLETE CBC W/AUTO DIFF WBC: CPT

## 2024-01-10 PROCEDURE — 94761 N-INVAS EAR/PLS OXIMETRY MLT: CPT

## 2024-01-10 PROCEDURE — 36415 COLL VENOUS BLD VENIPUNCTURE: CPT

## 2024-01-10 PROCEDURE — 2700000000 HC OXYGEN THERAPY PER DAY

## 2024-01-10 PROCEDURE — 97165 OT EVAL LOW COMPLEX 30 MIN: CPT

## 2024-01-10 PROCEDURE — 1200000000 HC SEMI PRIVATE

## 2024-01-10 RX ORDER — ENOXAPARIN SODIUM 100 MG/ML
30 INJECTION SUBCUTANEOUS 2 TIMES DAILY
Status: DISCONTINUED | OUTPATIENT
Start: 2024-01-10 | End: 2024-01-14 | Stop reason: HOSPADM

## 2024-01-10 RX ORDER — ACETAMINOPHEN 650 MG/1
650 SUPPOSITORY RECTAL EVERY 6 HOURS PRN
Status: DISCONTINUED | OUTPATIENT
Start: 2024-01-10 | End: 2024-01-14 | Stop reason: HOSPADM

## 2024-01-10 RX ORDER — AZITHROMYCIN 500 MG/1
500 TABLET, FILM COATED ORAL NIGHTLY
Status: COMPLETED | OUTPATIENT
Start: 2024-01-10 | End: 2024-01-11

## 2024-01-10 RX ORDER — SODIUM CHLORIDE 0.9 % (FLUSH) 0.9 %
5-40 SYRINGE (ML) INJECTION EVERY 12 HOURS SCHEDULED
Status: DISCONTINUED | OUTPATIENT
Start: 2024-01-10 | End: 2024-01-14 | Stop reason: HOSPADM

## 2024-01-10 RX ORDER — PREDNISONE 20 MG/1
40 TABLET ORAL DAILY
Status: DISCONTINUED | OUTPATIENT
Start: 2024-01-10 | End: 2024-01-11

## 2024-01-10 RX ORDER — CODEINE PHOSPHATE AND GUAIFENESIN 10; 100 MG/5ML; MG/5ML
5 SOLUTION ORAL EVERY 4 HOURS PRN
Status: COMPLETED | OUTPATIENT
Start: 2024-01-10 | End: 2024-01-12

## 2024-01-10 RX ORDER — ONDANSETRON 2 MG/ML
4 INJECTION INTRAMUSCULAR; INTRAVENOUS EVERY 6 HOURS PRN
Status: DISCONTINUED | OUTPATIENT
Start: 2024-01-10 | End: 2024-01-14 | Stop reason: HOSPADM

## 2024-01-10 RX ORDER — POLYETHYLENE GLYCOL 3350 17 G
2 POWDER IN PACKET (EA) ORAL
Status: DISCONTINUED | OUTPATIENT
Start: 2024-01-10 | End: 2024-01-14 | Stop reason: HOSPADM

## 2024-01-10 RX ORDER — SODIUM CHLORIDE 9 MG/ML
INJECTION, SOLUTION INTRAVENOUS CONTINUOUS
Status: ACTIVE | OUTPATIENT
Start: 2024-01-10 | End: 2024-01-10

## 2024-01-10 RX ORDER — ALBUTEROL SULFATE 2.5 MG/3ML
2.5 SOLUTION RESPIRATORY (INHALATION) EVERY 4 HOURS PRN
Status: DISCONTINUED | OUTPATIENT
Start: 2024-01-10 | End: 2024-01-14 | Stop reason: HOSPADM

## 2024-01-10 RX ORDER — ALBUTEROL SULFATE 2.5 MG/3ML
2.5 SOLUTION RESPIRATORY (INHALATION) EVERY 6 HOURS PRN
Status: DISCONTINUED | OUTPATIENT
Start: 2024-01-10 | End: 2024-01-10

## 2024-01-10 RX ORDER — ACETAMINOPHEN 325 MG/1
650 TABLET ORAL EVERY 6 HOURS PRN
Status: DISCONTINUED | OUTPATIENT
Start: 2024-01-10 | End: 2024-01-14 | Stop reason: HOSPADM

## 2024-01-10 RX ORDER — POLYETHYLENE GLYCOL 3350 17 G/17G
17 POWDER, FOR SOLUTION ORAL DAILY PRN
Status: DISCONTINUED | OUTPATIENT
Start: 2024-01-10 | End: 2024-01-14 | Stop reason: HOSPADM

## 2024-01-10 RX ORDER — ONDANSETRON 4 MG/1
4 TABLET, ORALLY DISINTEGRATING ORAL EVERY 8 HOURS PRN
Status: DISCONTINUED | OUTPATIENT
Start: 2024-01-10 | End: 2024-01-14 | Stop reason: HOSPADM

## 2024-01-10 RX ORDER — SODIUM CHLORIDE 9 MG/ML
INJECTION, SOLUTION INTRAVENOUS PRN
Status: DISCONTINUED | OUTPATIENT
Start: 2024-01-10 | End: 2024-01-14 | Stop reason: HOSPADM

## 2024-01-10 RX ORDER — GUAIFENESIN/DEXTROMETHORPHAN 100-10MG/5
5 SYRUP ORAL EVERY 4 HOURS PRN
Status: DISCONTINUED | OUTPATIENT
Start: 2024-01-10 | End: 2024-01-10

## 2024-01-10 RX ORDER — IPRATROPIUM BROMIDE AND ALBUTEROL SULFATE 2.5; .5 MG/3ML; MG/3ML
1 SOLUTION RESPIRATORY (INHALATION)
Status: DISCONTINUED | OUTPATIENT
Start: 2024-01-10 | End: 2024-01-14 | Stop reason: HOSPADM

## 2024-01-10 RX ORDER — SODIUM CHLORIDE 0.9 % (FLUSH) 0.9 %
5-40 SYRINGE (ML) INJECTION PRN
Status: DISCONTINUED | OUTPATIENT
Start: 2024-01-10 | End: 2024-01-14 | Stop reason: HOSPADM

## 2024-01-10 RX ORDER — MAGNESIUM HYDROXIDE/ALUMINUM HYDROXICE/SIMETHICONE 120; 1200; 1200 MG/30ML; MG/30ML; MG/30ML
30 SUSPENSION ORAL EVERY 6 HOURS PRN
Status: DISCONTINUED | OUTPATIENT
Start: 2024-01-10 | End: 2024-01-14 | Stop reason: HOSPADM

## 2024-01-10 RX ADMIN — IPRATROPIUM BROMIDE AND ALBUTEROL SULFATE 1 DOSE: 2.5; .5 SOLUTION RESPIRATORY (INHALATION) at 15:55

## 2024-01-10 RX ADMIN — IPRATROPIUM BROMIDE AND ALBUTEROL SULFATE 1 DOSE: 2.5; .5 SOLUTION RESPIRATORY (INHALATION) at 07:52

## 2024-01-10 RX ADMIN — AZITHROMYCIN 500 MG: 500 TABLET, FILM COATED ORAL at 20:35

## 2024-01-10 RX ADMIN — GUAIFENESIN AND CODEINE PHOSPHATE 5 ML: 100; 10 SOLUTION ORAL at 20:37

## 2024-01-10 RX ADMIN — GUAIFENESIN AND CODEINE PHOSPHATE 5 ML: 100; 10 SOLUTION ORAL at 06:41

## 2024-01-10 RX ADMIN — ENOXAPARIN SODIUM 30 MG: 100 INJECTION SUBCUTANEOUS at 09:10

## 2024-01-10 RX ADMIN — GUAIFENESIN SYRUP AND DEXTROMETHORPHAN 5 ML: 100; 10 SYRUP ORAL at 00:45

## 2024-01-10 RX ADMIN — IPRATROPIUM BROMIDE AND ALBUTEROL SULFATE 1 DOSE: 2.5; .5 SOLUTION RESPIRATORY (INHALATION) at 19:56

## 2024-01-10 RX ADMIN — ENOXAPARIN SODIUM 30 MG: 100 INJECTION SUBCUTANEOUS at 20:35

## 2024-01-10 RX ADMIN — IPRATROPIUM BROMIDE AND ALBUTEROL SULFATE 1 DOSE: 2.5; .5 SOLUTION RESPIRATORY (INHALATION) at 11:43

## 2024-01-10 RX ADMIN — SODIUM CHLORIDE: 9 INJECTION, SOLUTION INTRAVENOUS at 00:49

## 2024-01-10 RX ADMIN — SODIUM CHLORIDE, PRESERVATIVE FREE 10 ML: 5 INJECTION INTRAVENOUS at 20:37

## 2024-01-10 RX ADMIN — PREDNISONE 40 MG: 20 TABLET ORAL at 09:10

## 2024-01-10 NOTE — ASSESSMENT & PLAN NOTE
Suspect acute phase reactants secondary to COPD exacerbation.  Considered other potential sources of bacterial infection contributing to SIRS, but clinical picture is not consistent with sepsis secondary to bacterial infection.

## 2024-01-10 NOTE — FLOWSHEET NOTE
4 Eyes Skin Assessment     NAME:  Bibi Bullock  YOB: 1970  MEDICAL RECORD NUMBER:  [id number]    The patient is being assessed for  Admission    I agree that at least one RN has performed a thorough Head to Toe Skin Assessment on the patient. ALL assessment sites listed below have been assessed.      Areas assessed by both nurses:    Head, Face, Ears, Shoulders, Back, Chest, Arms, Elbows, Hands, Sacrum. Buttock, Coccyx, Ischium, and Legs. Feet and Heels        Does the Patient have a Wound? No noted wound(s)       Urban Prevention initiated by RN: No  Wound Care Orders initiated by RN: No    Pressure Injury (Stage 3,4, Unstageable, DTI, NWPT, and Complex wounds) if present, place Wound referral order by RN under : No    New Ostomies, if present place, Ostomy referral order under : No     Nurse 1 eSignature: Electronically signed by Eliza Tran RN on 1/10/24 at 2:56 AM EST    **SHARE this note so that the co-signing nurse can place an eSignature**    Nurse 2 eSignature: Electronically signed by Laney Durand RN on 1/10/24 at 5:08 AM EST

## 2024-01-10 NOTE — TELEPHONE ENCOUNTER
Called Yenny    Per Dr Lawson    Symptoms are generally well controlled on Trelegy but she states that she thinks she has more flareups that are reported.    Did you want to proceed with trelegy?    Told Yenny I will call her tomorrow with Dr Lawson response

## 2024-01-10 NOTE — TELEPHONE ENCOUNTER
Yenny/[facility] Pharmacist calls stating that Bibi needs a PA for Thomas. She asks that we send it in to the pharmacy here at [facility] so Bibi can have it as soon as possible. Maybe she can  a sample for her as well? This is urgent.  Thank you!

## 2024-01-10 NOTE — PLAN OF CARE
Problem: Discharge Planning  Goal: Discharge to home or other facility with appropriate resources  Outcome: Progressing     Problem: Safety - Adult  Goal: Free from fall injury  Outcome: Progressing     Problem: Respiratory - Adult  Goal: Achieves optimal ventilation and oxygenation  Outcome: Progressing     Problem: Musculoskeletal - Adult  Goal: Return mobility to safest level of function  Outcome: Progressing  Goal: Maintain proper alignment of affected body part  Outcome: Progressing  Goal: Return ADL status to a safe level of function  Outcome: Progressing

## 2024-01-10 NOTE — ASSESSMENT & PLAN NOTE
Steroids, DuoNebs, azithromycin.  May need a controlling agent on discharge.  Also, recently had her oxygen taken away from her after moving from [place].  Will likely benefit from oxygen at home as well.  Case management consult appreciated.

## 2024-01-10 NOTE — PLAN OF CARE
Problem: Discharge Planning  Goal: Discharge to home or other facility with appropriate resources  1/10/2024 1057 by Geo Correa RN  Outcome: Progressing  1/10/2024 0259 by Eliza Tran RN  Outcome: Progressing     Problem: Safety - Adult  Goal: Free from fall injury  1/10/2024 1057 by Geo Correa RN  Outcome: Progressing  1/10/2024 0259 by Eliza Tran RN  Outcome: Progressing     Problem: Respiratory - Adult  Goal: Achieves optimal ventilation and oxygenation  1/10/2024 1057 by Geo Correa RN  Outcome: Progressing  1/10/2024 0259 by Eliza Tran RN  Outcome: Progressing     Problem: Musculoskeletal - Adult  Goal: Return mobility to safest level of function  1/10/2024 1057 by Geo Correa RN  Outcome: Progressing  1/10/2024 0259 by Eliza Tran RN  Outcome: Progressing  Goal: Maintain proper alignment of affected body part  1/10/2024 1057 by Geo Correa RN  Outcome: Progressing  1/10/2024 0259 by Eliza Tran RN  Outcome: Progressing  Goal: Return ADL status to a safe level of function  1/10/2024 1057 by Geo Correa RN  Outcome: Progressing  1/10/2024 0259 by Eliza Tran RN  Outcome: Progressing

## 2024-01-10 NOTE — H&P
except as noted above and in HPI.    Past Medical, Surgical, Social, Family History:   Past Medical History:   Diagnosis Date    Anxiety     Asthma     COPD (chronic obstructive pulmonary disease) (HCC)     Depression     Endometriosis     Knee pain      Past Surgical History:   Procedure Laterality Date    CAROTID ENDARTERECTOMY Left 2023    LEFT CAROTID ENDARTERECTOMY performed by Jason Myles MD at [facility] OR     SECTION      ENDOMETRIAL ABLATION      HERNIA REPAIR      HYSTERECTOMY (CERVIX STATUS UNKNOWN)      INCONTINENCE SURGERY  2017    KNEE ARTHROSCOPY Right     2012    TONSILLECTOMY       Social History     Socioeconomic History    Marital status: Single     Spouse name: Not on file    Number of children: 8    Years of education: Not on file    Highest education level: Not on file   Occupational History    Occupation: on disability   Tobacco Use    Smoking status: Every Day     Current packs/day: 0.50     Average packs/day: 0.5 packs/day for 40.0 years (20.0 ttl pk-yrs)     Types: Cigarettes     Start date: 1984    Smokeless tobacco: Never    Tobacco comments:     Trying to cut down   Vaping Use    Vaping Use: Never used   Substance and Sexual Activity    Alcohol use: No     Alcohol/week: 0.0 standard drinks of alcohol    Drug use: No    Sexual activity: Yes     Partners: Male   Other Topics Concern    Not on file   Social History Narrative    One child at home    No grandchildren     Social Determinants of Health     Financial Resource Strain: Low Risk  (2023)    Overall Financial Resource Strain (CARDIA)     Difficulty of Paying Living Expenses: Not very hard   Food Insecurity: Not on file (2023)   Transportation Needs: Unknown (2023)    PRAPARE - Transportation     Lack of Transportation (Medical): Not on file     Lack of Transportation (Non-Medical): No   Physical Activity: Not on file   Stress: Not on file   Social Connections: Not on file   Intimate Partner

## 2024-01-10 NOTE — ED PROVIDER NOTES
Attending Supervisory Note/Shared Visit   I have personally performed a face to face diagnostic evaluation on this patient. I have reviewed the mid-level’s findings and agree.  History and Exam by me shows well-appearing female no acute distress.  Patient was in the ED for evaluation of dyspnea and wheezing.  She reports that she is a current smoker and has a history of COPD.  She is that she is out of her home medications.  Denies chest pains.  Patient did arrive hypoxic and reports she was previously on oxygen but is not currently on oxygen.  She states that she was then moved from [place] to [place].  Time evaluation the patient is sitting upright and speaking in full sentences and has a normal respiratory effort.     On exam patient has a regular rate and rhythm.  Has a normal respiratory effort with minimal expiratory wheezing.  She appears well-perfused.     Patient was initially seen by advanced per his provider.  VBG without signs of hypercapnia.  Troponin within normal limits.  BNP is not elevated.  Chest x-ray concerning for reactive airway disease versus bronchitis.  Trauma evaluation patient report significant improvement of symptoms.  Her oxygen levels do remain low but patient is unclear baseline and this may be secondary to VQ mismatch from recent DuoNeb treatments.  She does report feeling significantly improved.  Admission to the hospital for treatment of COPD and dyspnea discussed with patient and she states that she feels well enough to go home and is amenable to treatment.  I did attempt to wean the patient's oxygen down but she remained hypoxic with oxygen saturations in the low 80s and dyspneic.  She will benefit from admission to the hospital for further medical management evaluation.  Plan of care discussed with the hospice agreed to accept the patient.     I agree with the [facility] plan of care. Please [facility] Note for full ED course and final disposition    Based on my interpretation patient's 
order for additional albuterol x 2.  This will yield total DuoNeb x 2 and albuterol x 3.  In addition, the patient has received mag sulfate 2 g IVPB over 30 minutes, Solu-Medrol 125 mg IV x 1, Robitussin DM 10 mL x 1 and azithromycin 500 mg tablet.    Despite treatment the patient remains hypoxic currently on 5 L nasal cannula and saturating 88%.    Laboratory studies obtained showing negative flu and COVID.  WBC 13.6.  Hemoglobin 15.2.  CMP showed normal renal and hepatic function.  Troponin less than 6.  Serum hCG negative.  The patient's VBG showing a venous pH of 7.38, pCO2 of 47.2 and O2 saturation at 62.2.  proBNP 113.    The patient chest x-ray showing inflammatory changes central airway thickening which could represent bronchitis or reactive airway.  There is no confluent consolidation appreciated.      Disposition Considerations (tests considered but not done, Admit vs D/C, Shared Decision Making, Pt Expectation of Test or Tx.):     Patient will be admitted with acute COPD exacerbation and tobacco abuse.  Patient is hypoxic quiring O2 supplementation.  She does not wear oxygen at home.  Patient received DuoNeb x 2 and albuterol x 3 along with magnesium sulfate, Solu-Medrol and azithromycin 500 mg p.o. tablet.    I did not ambulate the patient as she is hypoxic on room air at 83%.  The patient on 5 L nasal cannula saturating 88% - 90%.    This case was reviewed and discussed with attending physician was personally evaluated the patient      I am the Primary Clinician of Record.  FINAL IMPRESSION      1. Chronic obstructive pulmonary disease with hypoxia (HCC)    2. Hypoxemia requiring supplemental oxygen    3. Smoker          DISPOSITION/PLAN     DISPOSITION Admitted 01/09/2024 09:31:28 PM      PATIENT REFERRED TO:  No follow-up provider specified.    DISCHARGE MEDICATIONS:  Current Discharge Medication List          DISCONTINUED MEDICATIONS:  Current Discharge Medication List                 (Please note

## 2024-01-11 PROCEDURE — 2700000000 HC OXYGEN THERAPY PER DAY

## 2024-01-11 PROCEDURE — 2580000003 HC RX 258

## 2024-01-11 PROCEDURE — 6360000002 HC RX W HCPCS: Performed by: INTERNAL MEDICINE

## 2024-01-11 PROCEDURE — 1200000000 HC SEMI PRIVATE

## 2024-01-11 PROCEDURE — 99223 1ST HOSP IP/OBS HIGH 75: CPT | Performed by: INTERNAL MEDICINE

## 2024-01-11 PROCEDURE — 6370000000 HC RX 637 (ALT 250 FOR IP): Performed by: FAMILY MEDICINE

## 2024-01-11 PROCEDURE — 94640 AIRWAY INHALATION TREATMENT: CPT

## 2024-01-11 PROCEDURE — 2580000003 HC RX 258: Performed by: FAMILY MEDICINE

## 2024-01-11 PROCEDURE — 6360000002 HC RX W HCPCS: Performed by: FAMILY MEDICINE

## 2024-01-11 PROCEDURE — 94761 N-INVAS EAR/PLS OXIMETRY MLT: CPT

## 2024-01-11 RX ORDER — WATER 10 ML/10ML
INJECTION INTRAMUSCULAR; INTRAVENOUS; SUBCUTANEOUS
Status: COMPLETED
Start: 2024-01-11 | End: 2024-01-11

## 2024-01-11 RX ORDER — BUDESONIDE 0.5 MG/2ML
1 INHALANT ORAL
Status: DISCONTINUED | OUTPATIENT
Start: 2024-01-11 | End: 2024-01-14 | Stop reason: HOSPADM

## 2024-01-11 RX ORDER — METHYLPREDNISOLONE SODIUM SUCCINATE 40 MG/ML
40 INJECTION, POWDER, LYOPHILIZED, FOR SOLUTION INTRAMUSCULAR; INTRAVENOUS EVERY 6 HOURS
Status: DISCONTINUED | OUTPATIENT
Start: 2024-01-11 | End: 2024-01-13

## 2024-01-11 RX ORDER — FLUTICASONE FUROATE, UMECLIDINIUM BROMIDE AND VILANTEROL TRIFENATATE 100; 62.5; 25 UG/1; UG/1; UG/1
1 POWDER RESPIRATORY (INHALATION) DAILY
Qty: 3 EACH | Refills: 0 | Status: SHIPPED | OUTPATIENT
Start: 2024-01-11 | End: 2024-01-19

## 2024-01-11 RX ADMIN — ENOXAPARIN SODIUM 30 MG: 100 INJECTION SUBCUTANEOUS at 19:56

## 2024-01-11 RX ADMIN — GUAIFENESIN AND CODEINE PHOSPHATE 5 ML: 100; 10 SOLUTION ORAL at 15:32

## 2024-01-11 RX ADMIN — METHYLPREDNISOLONE SODIUM SUCCINATE 40 MG: 40 INJECTION, POWDER, LYOPHILIZED, FOR SOLUTION INTRAMUSCULAR; INTRAVENOUS at 19:57

## 2024-01-11 RX ADMIN — METHYLPREDNISOLONE SODIUM SUCCINATE 40 MG: 40 INJECTION, POWDER, LYOPHILIZED, FOR SOLUTION INTRAMUSCULAR; INTRAVENOUS at 15:32

## 2024-01-11 RX ADMIN — ALBUTEROL SULFATE 2.5 MG: 2.5 SOLUTION RESPIRATORY (INHALATION) at 08:34

## 2024-01-11 RX ADMIN — BUDESONIDE 1000 MCG: 0.5 SUSPENSION RESPIRATORY (INHALATION) at 20:08

## 2024-01-11 RX ADMIN — ACETAMINOPHEN 650 MG: 325 TABLET ORAL at 08:43

## 2024-01-11 RX ADMIN — IPRATROPIUM BROMIDE AND ALBUTEROL SULFATE 1 DOSE: 2.5; .5 SOLUTION RESPIRATORY (INHALATION) at 20:08

## 2024-01-11 RX ADMIN — GUAIFENESIN AND CODEINE PHOSPHATE 5 ML: 100; 10 SOLUTION ORAL at 19:56

## 2024-01-11 RX ADMIN — GUAIFENESIN AND CODEINE PHOSPHATE 5 ML: 100; 10 SOLUTION ORAL at 00:28

## 2024-01-11 RX ADMIN — PREDNISONE 40 MG: 20 TABLET ORAL at 08:29

## 2024-01-11 RX ADMIN — IPRATROPIUM BROMIDE AND ALBUTEROL SULFATE 1 DOSE: 2.5; .5 SOLUTION RESPIRATORY (INHALATION) at 11:30

## 2024-01-11 RX ADMIN — IPRATROPIUM BROMIDE AND ALBUTEROL SULFATE 1 DOSE: 2.5; .5 SOLUTION RESPIRATORY (INHALATION) at 16:20

## 2024-01-11 RX ADMIN — AZITHROMYCIN 500 MG: 500 TABLET, FILM COATED ORAL at 19:57

## 2024-01-11 RX ADMIN — SODIUM CHLORIDE, PRESERVATIVE FREE 10 ML: 5 INJECTION INTRAVENOUS at 19:57

## 2024-01-11 RX ADMIN — ENOXAPARIN SODIUM 30 MG: 100 INJECTION SUBCUTANEOUS at 08:31

## 2024-01-11 RX ADMIN — BUDESONIDE 1000 MCG: 0.5 SUSPENSION RESPIRATORY (INHALATION) at 11:50

## 2024-01-11 RX ADMIN — IPRATROPIUM BROMIDE AND ALBUTEROL SULFATE 1 DOSE: 2.5; .5 SOLUTION RESPIRATORY (INHALATION) at 07:41

## 2024-01-11 RX ADMIN — SODIUM CHLORIDE, PRESERVATIVE FREE 10 ML: 5 INJECTION INTRAVENOUS at 08:31

## 2024-01-11 RX ADMIN — GUAIFENESIN AND CODEINE PHOSPHATE 5 ML: 100; 10 SOLUTION ORAL at 11:00

## 2024-01-11 RX ADMIN — WATER: 1 INJECTION INTRAMUSCULAR; INTRAVENOUS; SUBCUTANEOUS at 15:37

## 2024-01-11 ASSESSMENT — PAIN DESCRIPTION - DESCRIPTORS: DESCRIPTORS: ACHING

## 2024-01-11 ASSESSMENT — PAIN DESCRIPTION - LOCATION: LOCATION: FLANK

## 2024-01-11 ASSESSMENT — PAIN SCALES - GENERAL: PAINLEVEL_OUTOF10: 4

## 2024-01-11 ASSESSMENT — PAIN DESCRIPTION - ORIENTATION: ORIENTATION: RIGHT;LEFT

## 2024-01-11 NOTE — CONSULTS
REASON FOR CONSULTATION/CC: copd /asthma      Consult at request of Marcelina Faria MD for copd /asthma    PCP: Lacy Restrepo APRN  Established Pulmonologist: Dr. Lawson    HISTORY OF PRESENT ILLNESS: Bibi Bullock is a 53 y.o. year old female with a history of  who presents with    She presents with 2 week hx of shortness of breath with mild sinus congestion who was using Trelegy.      ++ wheezing without significant improvement since admission       This note may have been  transcribed using Dragon Dictation software. Please disregard any translational errors.      Assessment:        COPD with asthma moderate obstruction with large bronchodilator response  Tobacco abuse  IgE 161  Eosinophilia 1.1    Plan:      Hospital Day 2     COPD with asthma exacerbation  Significant wheezing  High dose IV steroids with inhaled steroids  Duoneb's   Frequent exacerbations.  Could consider Fasenra     Smoking  Strongly advised needs to stop              This note was transcribed using Dragon Dictation software. Please disregard any translational errors.    Thank you for the consult    PATRICIA STEVEN NIURKA   [facility] Pulmonary, Sleep and Critical Care  [phone]             Data:     PAST MEDICAL HISTORY:  Past Medical History:   Diagnosis Date    Anxiety     Asthma     COPD (chronic obstructive pulmonary disease) (HCC)     Depression     Endometriosis     Knee pain        PAST SURGICAL HISTORY:  Past Surgical History:   Procedure Laterality Date    CAROTID ENDARTERECTOMY Left 2023    LEFT CAROTID ENDARTERECTOMY performed by Jason Myles MD at [facility] OR     SECTION      ENDOMETRIAL ABLATION      HERNIA REPAIR      HYSTERECTOMY (CERVIX STATUS UNKNOWN)      INCONTINENCE SURGERY  2017    KNEE ARTHROSCOPY Right     2012    TONSILLECTOMY         FAMILY HISTORY:  family history includes Alcohol Abuse in her father; Diabetes in her brother and mother; Heart Disease in her mother; Other in her brother,  No

## 2024-01-11 NOTE — PLAN OF CARE
Problem: Discharge Planning  Goal: Discharge to home or other facility with appropriate resources  1/11/2024 1052 by Missy Lea RN  Outcome: Progressing     Problem: Safety - Adult  Goal: Free from fall injury  1/11/2024 1052 by Missy Lea RN  Outcome: Progressing     Problem: Respiratory - Adult  Goal: Achieves optimal ventilation and oxygenation  1/11/2024 1052 by Missy Lea RN  Outcome: Progressing     Problem: Musculoskeletal - Adult  Goal: Return mobility to safest level of function  1/11/2024 1052 by Missy Lea RN  Outcome: Progressing     Problem: Musculoskeletal - Adult  Goal: Maintain proper alignment of affected body part  1/11/2024 1052 by Missy Lea RN  Outcome: Progressing     Problem: Musculoskeletal - Adult  Goal: Return ADL status to a safe level of function  Outcome: Progressing

## 2024-01-11 NOTE — PLAN OF CARE
Problem: Discharge Planning  Goal: Discharge to home or other facility with appropriate resources  Outcome: Progressing     Problem: Safety - Adult  Goal: Free from fall injury  Outcome: Progressing     Problem: Respiratory - Adult  Goal: Achieves optimal ventilation and oxygenation  Outcome: Progressing     Problem: Musculoskeletal - Adult  Goal: Return mobility to safest level of function  Outcome: Progressing  Goal: Maintain proper alignment of affected body part  Outcome: Progressing

## 2024-01-12 ENCOUNTER — TELEPHONE (OUTPATIENT)
Dept: PULMONOLOGY | Age: 54
End: 2024-01-12

## 2024-01-12 DIAGNOSIS — J44.9 CHRONIC OBSTRUCTIVE PULMONARY DISEASE WITH HYPOXIA (HCC): Primary | ICD-10-CM

## 2024-01-12 DIAGNOSIS — R09.02 CHRONIC OBSTRUCTIVE PULMONARY DISEASE WITH HYPOXIA (HCC): Primary | ICD-10-CM

## 2024-01-12 PROCEDURE — 94640 AIRWAY INHALATION TREATMENT: CPT

## 2024-01-12 PROCEDURE — 2700000000 HC OXYGEN THERAPY PER DAY

## 2024-01-12 PROCEDURE — 1200000000 HC SEMI PRIVATE

## 2024-01-12 PROCEDURE — 6360000002 HC RX W HCPCS: Performed by: INTERNAL MEDICINE

## 2024-01-12 PROCEDURE — 6360000002 HC RX W HCPCS: Performed by: FAMILY MEDICINE

## 2024-01-12 PROCEDURE — 6370000000 HC RX 637 (ALT 250 FOR IP): Performed by: NURSE PRACTITIONER

## 2024-01-12 PROCEDURE — 6370000000 HC RX 637 (ALT 250 FOR IP): Performed by: FAMILY MEDICINE

## 2024-01-12 PROCEDURE — 2580000003 HC RX 258

## 2024-01-12 PROCEDURE — 2580000003 HC RX 258: Performed by: FAMILY MEDICINE

## 2024-01-12 PROCEDURE — 99233 SBSQ HOSP IP/OBS HIGH 50: CPT | Performed by: INTERNAL MEDICINE

## 2024-01-12 RX ORDER — WATER 10 ML/10ML
INJECTION INTRAMUSCULAR; INTRAVENOUS; SUBCUTANEOUS
Status: COMPLETED
Start: 2024-01-12 | End: 2024-01-12

## 2024-01-12 RX ORDER — CODEINE PHOSPHATE AND GUAIFENESIN 10; 100 MG/5ML; MG/5ML
5 SOLUTION ORAL EVERY 4 HOURS PRN
Status: DISCONTINUED | OUTPATIENT
Start: 2024-01-12 | End: 2024-01-14 | Stop reason: HOSPADM

## 2024-01-12 RX ADMIN — BUDESONIDE 1000 MCG: 0.5 SUSPENSION RESPIRATORY (INHALATION) at 20:07

## 2024-01-12 RX ADMIN — BUDESONIDE 1000 MCG: 0.5 SUSPENSION RESPIRATORY (INHALATION) at 08:44

## 2024-01-12 RX ADMIN — GUAIFENESIN AND CODEINE PHOSPHATE 5 ML: 100; 10 SOLUTION ORAL at 04:53

## 2024-01-12 RX ADMIN — ENOXAPARIN SODIUM 30 MG: 100 INJECTION SUBCUTANEOUS at 08:14

## 2024-01-12 RX ADMIN — METHYLPREDNISOLONE SODIUM SUCCINATE 40 MG: 40 INJECTION, POWDER, LYOPHILIZED, FOR SOLUTION INTRAMUSCULAR; INTRAVENOUS at 04:53

## 2024-01-12 RX ADMIN — IPRATROPIUM BROMIDE AND ALBUTEROL SULFATE 1 DOSE: 2.5; .5 SOLUTION RESPIRATORY (INHALATION) at 08:43

## 2024-01-12 RX ADMIN — SODIUM CHLORIDE, PRESERVATIVE FREE 10 ML: 5 INJECTION INTRAVENOUS at 21:14

## 2024-01-12 RX ADMIN — ENOXAPARIN SODIUM 30 MG: 100 INJECTION SUBCUTANEOUS at 21:14

## 2024-01-12 RX ADMIN — METHYLPREDNISOLONE SODIUM SUCCINATE 40 MG: 40 INJECTION, POWDER, LYOPHILIZED, FOR SOLUTION INTRAMUSCULAR; INTRAVENOUS at 16:16

## 2024-01-12 RX ADMIN — METHYLPREDNISOLONE SODIUM SUCCINATE 40 MG: 40 INJECTION, POWDER, LYOPHILIZED, FOR SOLUTION INTRAMUSCULAR; INTRAVENOUS at 21:14

## 2024-01-12 RX ADMIN — GUAIFENESIN AND CODEINE PHOSPHATE 5 ML: 100; 10 SOLUTION ORAL at 21:47

## 2024-01-12 RX ADMIN — WATER 10 ML: 1 INJECTION INTRAMUSCULAR; INTRAVENOUS; SUBCUTANEOUS at 08:14

## 2024-01-12 RX ADMIN — IPRATROPIUM BROMIDE AND ALBUTEROL SULFATE 1 DOSE: 2.5; .5 SOLUTION RESPIRATORY (INHALATION) at 20:07

## 2024-01-12 RX ADMIN — METHYLPREDNISOLONE SODIUM SUCCINATE 40 MG: 40 INJECTION, POWDER, LYOPHILIZED, FOR SOLUTION INTRAMUSCULAR; INTRAVENOUS at 08:14

## 2024-01-12 RX ADMIN — IPRATROPIUM BROMIDE AND ALBUTEROL SULFATE 1 DOSE: 2.5; .5 SOLUTION RESPIRATORY (INHALATION) at 11:23

## 2024-01-12 RX ADMIN — GUAIFENESIN AND CODEINE PHOSPHATE 5 ML: 100; 10 SOLUTION ORAL at 08:13

## 2024-01-12 RX ADMIN — IPRATROPIUM BROMIDE AND ALBUTEROL SULFATE 1 DOSE: 2.5; .5 SOLUTION RESPIRATORY (INHALATION) at 15:50

## 2024-01-12 RX ADMIN — SODIUM CHLORIDE, PRESERVATIVE FREE 10 ML: 5 INJECTION INTRAVENOUS at 08:15

## 2024-01-12 NOTE — TELEPHONE ENCOUNTER
Submitted PA for TRELEGY  Via CMM (Key: [id number] STATUS: PENDING.    Follow up done daily; if no decision with in three days we will refax.  If another three days goes by with no decision will call the insurance for status.

## 2024-01-12 NOTE — TELEPHONE ENCOUNTER
Received fax from [employer] [facility] for Trelegy [id number] mcg     KEY:  [id number]  Patient name: Bibi Bullock  : 1970     J44.9hronic obstructive pulmonary disease, unspecified COPD     Sent to PA Pool

## 2024-01-12 NOTE — PLAN OF CARE
Problem: Discharge Planning  Goal: Discharge to home or other facility with appropriate resources  Outcome: Progressing     Problem: Safety - Adult  Goal: Free from fall injury  Outcome: Progressing     Problem: Respiratory - Adult  Goal: Achieves optimal ventilation and oxygenation  Outcome: Progressing     Problem: Musculoskeletal - Adult  Goal: Return mobility to safest level of function  Outcome: Progressing     Problem: Musculoskeletal - Adult  Goal: Maintain proper alignment of affected body part  Outcome: Progressing     Problem: Musculoskeletal - Adult  Goal: Return ADL status to a safe level of function  Outcome: Progressing

## 2024-01-13 PROBLEM — D72.19 OTHER EOSINOPHILIA: Status: ACTIVE | Noted: 2024-01-13

## 2024-01-13 PROBLEM — R09.02 CHRONIC OBSTRUCTIVE PULMONARY DISEASE WITH HYPOXIA (HCC): Status: ACTIVE | Noted: 2023-06-26

## 2024-01-13 LAB
ANION GAP SERPL CALCULATED.3IONS-SCNC: 8 MMOL/L (ref 3–16)
BASOPHILS # BLD: 0.1 K/UL (ref 0–0.2)
BASOPHILS NFR BLD: 0.6 %
BUN SERPL-MCNC: 18 MG/DL (ref 7–20)
CALCIUM SERPL-MCNC: 9.2 MG/DL (ref 8.3–10.6)
CHLORIDE SERPL-SCNC: 99 MMOL/L (ref 99–110)
CO2 SERPL-SCNC: 30 MMOL/L (ref 21–32)
CREAT SERPL-MCNC: <0.5 MG/DL (ref 0.6–1.1)
DEPRECATED RDW RBC AUTO: 13.9 % (ref 12.4–15.4)
EOSINOPHIL # BLD: 0 K/UL (ref 0–0.6)
EOSINOPHIL NFR BLD: 0 %
GFR SERPLBLD CREATININE-BSD FMLA CKD-EPI: >60 ML/MIN/{1.73_M2}
GLUCOSE SERPL-MCNC: 133 MG/DL (ref 70–99)
HCT VFR BLD AUTO: 46.1 % (ref 36–48)
HGB BLD-MCNC: 15.5 G/DL (ref 12–16)
LYMPHOCYTES # BLD: 1.8 K/UL (ref 1–5.1)
LYMPHOCYTES NFR BLD: 9.2 %
MCH RBC QN AUTO: 29.3 PG (ref 26–34)
MCHC RBC AUTO-ENTMCNC: 33.6 G/DL (ref 31–36)
MCV RBC AUTO: 86.9 FL (ref 80–100)
MONOCYTES # BLD: 0.8 K/UL (ref 0–1.3)
MONOCYTES NFR BLD: 4.1 %
NEUTROPHILS # BLD: 17.2 K/UL (ref 1.7–7.7)
NEUTROPHILS NFR BLD: 86.1 %
PLATELET # BLD AUTO: 469 K/UL (ref 135–450)
PMV BLD AUTO: 7.6 FL (ref 5–10.5)
POTASSIUM SERPL-SCNC: 4.5 MMOL/L (ref 3.5–5.1)
RBC # BLD AUTO: 5.3 M/UL (ref 4–5.2)
SODIUM SERPL-SCNC: 137 MMOL/L (ref 136–145)
WBC # BLD AUTO: 20 K/UL (ref 4–11)

## 2024-01-13 PROCEDURE — 94640 AIRWAY INHALATION TREATMENT: CPT

## 2024-01-13 PROCEDURE — 6360000002 HC RX W HCPCS: Performed by: INTERNAL MEDICINE

## 2024-01-13 PROCEDURE — 6360000002 HC RX W HCPCS: Performed by: NURSE PRACTITIONER

## 2024-01-13 PROCEDURE — 36415 COLL VENOUS BLD VENIPUNCTURE: CPT

## 2024-01-13 PROCEDURE — 6370000000 HC RX 637 (ALT 250 FOR IP): Performed by: NURSE PRACTITIONER

## 2024-01-13 PROCEDURE — 6360000002 HC RX W HCPCS: Performed by: FAMILY MEDICINE

## 2024-01-13 PROCEDURE — 94760 N-INVAS EAR/PLS OXIMETRY 1: CPT

## 2024-01-13 PROCEDURE — 1200000000 HC SEMI PRIVATE

## 2024-01-13 PROCEDURE — 2580000003 HC RX 258: Performed by: FAMILY MEDICINE

## 2024-01-13 PROCEDURE — 85025 COMPLETE CBC W/AUTO DIFF WBC: CPT

## 2024-01-13 PROCEDURE — 80048 BASIC METABOLIC PNL TOTAL CA: CPT

## 2024-01-13 PROCEDURE — 99231 SBSQ HOSP IP/OBS SF/LOW 25: CPT | Performed by: NURSE PRACTITIONER

## 2024-01-13 PROCEDURE — 6370000000 HC RX 637 (ALT 250 FOR IP): Performed by: FAMILY MEDICINE

## 2024-01-13 PROCEDURE — 2700000000 HC OXYGEN THERAPY PER DAY

## 2024-01-13 PROCEDURE — 94669 MECHANICAL CHEST WALL OSCILL: CPT

## 2024-01-13 RX ORDER — LANOLIN ALCOHOL/MO/W.PET/CERES
3 CREAM (GRAM) TOPICAL NIGHTLY PRN
Status: DISCONTINUED | OUTPATIENT
Start: 2024-01-13 | End: 2024-01-14 | Stop reason: HOSPADM

## 2024-01-13 RX ORDER — METHYLPREDNISOLONE SODIUM SUCCINATE 40 MG/ML
40 INJECTION, POWDER, LYOPHILIZED, FOR SOLUTION INTRAMUSCULAR; INTRAVENOUS EVERY 8 HOURS
Status: DISCONTINUED | OUTPATIENT
Start: 2024-01-13 | End: 2024-01-14

## 2024-01-13 RX ADMIN — Medication 3 MG: at 22:01

## 2024-01-13 RX ADMIN — IPRATROPIUM BROMIDE AND ALBUTEROL SULFATE 1 DOSE: 2.5; .5 SOLUTION RESPIRATORY (INHALATION) at 08:48

## 2024-01-13 RX ADMIN — BUDESONIDE 1000 MCG: 0.5 SUSPENSION RESPIRATORY (INHALATION) at 19:54

## 2024-01-13 RX ADMIN — SODIUM CHLORIDE, PRESERVATIVE FREE 10 ML: 5 INJECTION INTRAVENOUS at 20:18

## 2024-01-13 RX ADMIN — IPRATROPIUM BROMIDE AND ALBUTEROL SULFATE 1 DOSE: 2.5; .5 SOLUTION RESPIRATORY (INHALATION) at 13:25

## 2024-01-13 RX ADMIN — GUAIFENESIN AND CODEINE PHOSPHATE 5 ML: 100; 10 SOLUTION ORAL at 22:01

## 2024-01-13 RX ADMIN — ENOXAPARIN SODIUM 30 MG: 100 INJECTION SUBCUTANEOUS at 20:17

## 2024-01-13 RX ADMIN — IPRATROPIUM BROMIDE AND ALBUTEROL SULFATE 1 DOSE: 2.5; .5 SOLUTION RESPIRATORY (INHALATION) at 19:54

## 2024-01-13 RX ADMIN — METHYLPREDNISOLONE SODIUM SUCCINATE 40 MG: 40 INJECTION, POWDER, LYOPHILIZED, FOR SOLUTION INTRAMUSCULAR; INTRAVENOUS at 02:01

## 2024-01-13 RX ADMIN — IPRATROPIUM BROMIDE AND ALBUTEROL SULFATE 1 DOSE: 2.5; .5 SOLUTION RESPIRATORY (INHALATION) at 16:25

## 2024-01-13 RX ADMIN — GUAIFENESIN AND CODEINE PHOSPHATE 5 ML: 100; 10 SOLUTION ORAL at 02:01

## 2024-01-13 RX ADMIN — Medication 3 MG: at 02:01

## 2024-01-13 RX ADMIN — GUAIFENESIN AND CODEINE PHOSPHATE 5 ML: 100; 10 SOLUTION ORAL at 18:23

## 2024-01-13 RX ADMIN — BUDESONIDE 1000 MCG: 0.5 SUSPENSION RESPIRATORY (INHALATION) at 08:46

## 2024-01-13 RX ADMIN — METHYLPREDNISOLONE SODIUM SUCCINATE 40 MG: 40 INJECTION INTRAMUSCULAR; INTRAVENOUS at 18:08

## 2024-01-13 RX ADMIN — ENOXAPARIN SODIUM 30 MG: 100 INJECTION SUBCUTANEOUS at 09:37

## 2024-01-13 RX ADMIN — SODIUM CHLORIDE, PRESERVATIVE FREE 10 ML: 5 INJECTION INTRAVENOUS at 09:38

## 2024-01-13 RX ADMIN — METHYLPREDNISOLONE SODIUM SUCCINATE 40 MG: 40 INJECTION, POWDER, LYOPHILIZED, FOR SOLUTION INTRAMUSCULAR; INTRAVENOUS at 09:37

## 2024-01-13 NOTE — PLAN OF CARE
Problem: Discharge Planning  Goal: Discharge to home or other facility with appropriate resources  Outcome: Progressing  Flowsheets (Taken 1/12/2024 2114)  Discharge to home or other facility with appropriate resources:   Identify barriers to discharge with patient and caregiver   Arrange for needed discharge resources and transportation as appropriate     Problem: Safety - Adult  Goal: Free from fall injury  Outcome: Progressing     Problem: Respiratory - Adult  Goal: Achieves optimal ventilation and oxygenation  Outcome: Progressing  Flowsheets (Taken 1/12/2024 2114)  Achieves optimal ventilation and oxygenation:   Assess for changes in respiratory status   Assess for changes in mentation and behavior   Position to facilitate oxygenation and minimize respiratory effort   Oxygen supplementation based on oxygen saturation or arterial blood gases   Initiate smoking cessation protocol as indicated   Encourage broncho-pulmonary hygiene including cough, deep breathe, incentive spirometry   Assess and instruct to report shortness of breath or any respiratory difficulty   Respiratory therapy support as indicated     Problem: Musculoskeletal - Adult  Goal: Return mobility to safest level of function  Outcome: Progressing  Flowsheets (Taken 1/12/2024 2114)  Return Mobility to Safest Level of Function:   Assess patient stability and activity tolerance for standing, transferring and ambulating with or without assistive devices   Assist with transfers and ambulation using safe patient handling equipment as needed   Instruct patient/family in ordered activity level  Goal: Maintain proper alignment of affected body part  Outcome: Progressing  Goal: Return ADL status to a safe level of function  Outcome: Progressing  Flowsheets (Taken 1/12/2024 2114)  Return ADL Status to a Safe Level of Function:   Administer medication as ordered   Assess activities of daily living deficits and provide assistive devices as needed   Obtain

## 2024-01-13 NOTE — FLOWSHEET NOTE
Secure message sent to Amanda Pedroza per patient request for sleep medication. New order obtained for melatonin.

## 2024-01-13 NOTE — PLAN OF CARE
Problem: Safety - Adult  Goal: Free from fall injury  1/13/2024 1017 by Barbie Mahoney RN  Outcome: Progressing     Problem: Respiratory - Adult  Goal: Achieves optimal ventilation and oxygenation  1/13/2024 1017 by Barbie Mahoney RN  Outcome: Progressing     Problem: Musculoskeletal - Adult  Goal: Return mobility to safest level of function  1/13/2024 1017 by Barbie Mahoney RN  Outcome: Progressing     Problem: Musculoskeletal - Adult  Goal: Maintain proper alignment of affected body part  1/13/2024 1017 by Barbie Mahoney RN  Outcome: Progressing     Problem: Musculoskeletal - Adult  Goal: Return ADL status to a safe level of function  1/13/2024 1017 by Barbie Mahoney RN  Outcome: Progressing     Problem: Discharge Planning  Goal: Discharge to home or other facility with appropriate resources  1/13/2024 1017 by Barbie Mahoney RN  Outcome: Progressing

## 2024-01-13 NOTE — FLOWSHEET NOTE
Secure message sent to Amanda Pedroza NP per patient request for cough medication.  New order obtained.

## 2024-01-14 VITALS
HEIGHT: 67 IN | RESPIRATION RATE: 18 BRPM | OXYGEN SATURATION: 90 % | HEART RATE: 80 BPM | TEMPERATURE: 97.8 F | WEIGHT: 233.25 LBS | BODY MASS INDEX: 36.61 KG/M2 | SYSTOLIC BLOOD PRESSURE: 150 MMHG | DIASTOLIC BLOOD PRESSURE: 72 MMHG

## 2024-01-14 PROCEDURE — 6360000002 HC RX W HCPCS: Performed by: INTERNAL MEDICINE

## 2024-01-14 PROCEDURE — 2700000000 HC OXYGEN THERAPY PER DAY

## 2024-01-14 PROCEDURE — 6370000000 HC RX 637 (ALT 250 FOR IP): Performed by: NURSE PRACTITIONER

## 2024-01-14 PROCEDURE — 2580000003 HC RX 258: Performed by: FAMILY MEDICINE

## 2024-01-14 PROCEDURE — 99231 SBSQ HOSP IP/OBS SF/LOW 25: CPT | Performed by: NURSE PRACTITIONER

## 2024-01-14 PROCEDURE — 6360000002 HC RX W HCPCS: Performed by: NURSE PRACTITIONER

## 2024-01-14 PROCEDURE — 2580000003 HC RX 258

## 2024-01-14 PROCEDURE — 94680 O2 UPTK RST&XERS DIR SIMPLE: CPT

## 2024-01-14 PROCEDURE — 94761 N-INVAS EAR/PLS OXIMETRY MLT: CPT

## 2024-01-14 PROCEDURE — 94669 MECHANICAL CHEST WALL OSCILL: CPT

## 2024-01-14 PROCEDURE — 6360000002 HC RX W HCPCS: Performed by: FAMILY MEDICINE

## 2024-01-14 PROCEDURE — 94640 AIRWAY INHALATION TREATMENT: CPT

## 2024-01-14 PROCEDURE — 6370000000 HC RX 637 (ALT 250 FOR IP): Performed by: FAMILY MEDICINE

## 2024-01-14 RX ORDER — PREDNISONE 20 MG/1
20 TABLET ORAL DAILY
Status: DISCONTINUED | OUTPATIENT
Start: 2024-01-20 | End: 2024-01-14 | Stop reason: HOSPADM

## 2024-01-14 RX ORDER — PREDNISONE 10 MG/1
10 TABLET ORAL DAILY
Status: DISCONTINUED | OUTPATIENT
Start: 2024-01-23 | End: 2024-01-14 | Stop reason: HOSPADM

## 2024-01-14 RX ORDER — PREDNISONE 20 MG/1
40 TABLET ORAL DAILY
Status: DISCONTINUED | OUTPATIENT
Start: 2024-01-14 | End: 2024-01-14 | Stop reason: HOSPADM

## 2024-01-14 RX ORDER — CODEINE PHOSPHATE AND GUAIFENESIN 10; 100 MG/5ML; MG/5ML
5 SOLUTION ORAL EVERY 4 HOURS PRN
Qty: 420 ML | Refills: 0 | Status: SHIPPED | OUTPATIENT
Start: 2024-01-14 | End: 2024-01-28

## 2024-01-14 RX ORDER — PREDNISONE 10 MG/1
TABLET ORAL
Qty: 30 TABLET | Refills: 0 | Status: SHIPPED | OUTPATIENT
Start: 2024-01-23

## 2024-01-14 RX ORDER — WATER 10 ML/10ML
INJECTION INTRAMUSCULAR; INTRAVENOUS; SUBCUTANEOUS
Status: COMPLETED
Start: 2024-01-14 | End: 2024-01-14

## 2024-01-14 RX ADMIN — ENOXAPARIN SODIUM 30 MG: 100 INJECTION SUBCUTANEOUS at 08:59

## 2024-01-14 RX ADMIN — METHYLPREDNISOLONE SODIUM SUCCINATE 40 MG: 40 INJECTION INTRAMUSCULAR; INTRAVENOUS at 08:59

## 2024-01-14 RX ADMIN — BUDESONIDE 1000 MCG: 0.5 SUSPENSION RESPIRATORY (INHALATION) at 08:35

## 2024-01-14 RX ADMIN — SODIUM CHLORIDE, PRESERVATIVE FREE 10 ML: 5 INJECTION INTRAVENOUS at 09:00

## 2024-01-14 RX ADMIN — PREDNISONE 40 MG: 20 TABLET ORAL at 14:31

## 2024-01-14 RX ADMIN — METHYLPREDNISOLONE SODIUM SUCCINATE 40 MG: 40 INJECTION INTRAMUSCULAR; INTRAVENOUS at 02:01

## 2024-01-14 RX ADMIN — IPRATROPIUM BROMIDE AND ALBUTEROL SULFATE 1 DOSE: 2.5; .5 SOLUTION RESPIRATORY (INHALATION) at 11:57

## 2024-01-14 RX ADMIN — GUAIFENESIN AND CODEINE PHOSPHATE 5 ML: 100; 10 SOLUTION ORAL at 09:08

## 2024-01-14 RX ADMIN — IPRATROPIUM BROMIDE AND ALBUTEROL SULFATE 1 DOSE: 2.5; .5 SOLUTION RESPIRATORY (INHALATION) at 08:35

## 2024-01-14 RX ADMIN — WATER 10 ML: 1 INJECTION INTRAMUSCULAR; INTRAVENOUS; SUBCUTANEOUS at 09:00

## 2024-01-14 NOTE — CARE COORDINATION
Recd call from RT that pt qualifies for O2. Called to Yes[facility], no answer.  Spoke with RT Nat to inform.  She will deliver O2 to pt once Yesenia returns call.  Electronically signed by SACHI Koroma on 1/14/2024 at 1:32 PM    Spoke with [employer] rep who will pull orders and call back if there is a problem.  RT Nat, will deliver concentrator to pts room.  Awaiting dc order  Electronically signed by SACHI Koroma on 1/14/2024 at 1:42 PM    
Shopping  Current functional level: (P) Assistance with the following:, Cooking, Housework, Shopping    PT AM-PAC:   /24  OT AM-PAC:   /24    Family can provide assistance at DC: (P) Yes  Would you like Case Management to discuss the discharge plan with any other family members/significant others, and if so, who? (P) No  Plans to Return to Present Housing: (P) Yes  Other Identified Issues/Barriers to RETURNING to current housing: None noted  Potential Assistance needed at discharge: (P) Other (Comment) (Undetermined.  Awaiting PT/OT to evaluate)            Potential DME:  Unknown  Patient expects to discharge to: (P) Apartment  Plan for transportation at discharge: (P) Family (Pts brother will transport pt home at d/c.)    Financial    Payor: [employer] [place] / Plan: [employer] [place] / Product Type: *No Product type* /     Does insurance require precert for SNF: Yes    Potential assistance Purchasing Medications: (P) Yes (Pt stated her inhailers have not been covered as they require a prior auth.  [facility] selected for this hospitalization with the hope this will be worked out prior to d/c.)  Meds-to-Beds request:        [facility] [id number] - [facility] [address] [place] [address] [phone] - F [phone]  [address] [place] [id number]  Phone: [phone] Fax: [phone]    [facility] - [address] - P [phone] - F [phone]  [address] [id number]  Phone: [phone] Fax: [phone]      Notes:    Factors facilitating achievement of predicted outcomes: Family support, Friend support, Motivated, Cooperative, Pleasant, and Sense of humor    Barriers to discharge: Long standing deficits and possible need for oxygen at home    Additional Case Management Notes: Pt reported recently moving into an apartment with her brother, Austin and Austin's two sons.  There are 0 LUISA.  Pt has a cane and walker at home and stated

## 2024-01-14 NOTE — FLOWSHEET NOTE
Slept at intervals.  SOB with little ambulation and talking.  IS and acapella encouraged.  No needs this am.

## 2024-01-14 NOTE — PLAN OF CARE
Problem: Discharge Planning  Goal: Discharge to home or other facility with appropriate resources  1/14/2024 1442 by Tiffanie Flood RN  Outcome: Completed  1/14/2024 0912 by Tiffanie Flood RN  Outcome: Progressing  1/14/2024 0222 by Angi Ramon RN  Outcome: Progressing  Flowsheets (Taken 1/13/2024 2017)  Discharge to home or other facility with appropriate resources:   Identify barriers to discharge with patient and caregiver   Arrange for needed discharge resources and transportation as appropriate     Problem: Safety - Adult  Goal: Free from fall injury  1/14/2024 1442 by Tiffanie Flood RN  Outcome: Completed  1/14/2024 0912 by Tiffanie Flood RN  Outcome: Progressing  1/14/2024 0222 by Angi Ramon RN  Outcome: Progressing     Problem: Respiratory - Adult  Goal: Achieves optimal ventilation and oxygenation  1/14/2024 1442 by Tiffanie Flood RN  Outcome: Completed  1/14/2024 0912 by Tiffanie Flood RN  Outcome: Progressing  1/14/2024 0222 by Angi Ramon RN  Outcome: Progressing  Flowsheets (Taken 1/13/2024 2017)  Achieves optimal ventilation and oxygenation:   Assess for changes in respiratory status   Assess for changes in mentation and behavior   Position to facilitate oxygenation and minimize respiratory effort   Oxygen supplementation based on oxygen saturation or arterial blood gases   Initiate smoking cessation protocol as indicated   Encourage broncho-pulmonary hygiene including cough, deep breathe, incentive spirometry   Assess the need for suctioning and aspirate as needed   Assess and instruct to report shortness of breath or any respiratory difficulty   Respiratory therapy support as indicated     Problem: Musculoskeletal - Adult  Goal: Return mobility to safest level of function  1/14/2024 1442 by Tiffanie Flood RN  Outcome: Completed  1/14/2024 0912 by Tiffanie Flood RN  Outcome: Progressing  1/14/2024 0222 by Angi Ramon RN  Outcome:

## 2024-01-14 NOTE — PLAN OF CARE
Problem: Discharge Planning  Goal: Discharge to home or other facility with appropriate resources  1/14/2024 0912 by Tiffanie Flood RN  Outcome: Progressing  1/14/2024 0222 by Angi Ramon RN  Outcome: Progressing  Flowsheets (Taken 1/13/2024 2017)  Discharge to home or other facility with appropriate resources:   Identify barriers to discharge with patient and caregiver   Arrange for needed discharge resources and transportation as appropriate     Problem: Safety - Adult  Goal: Free from fall injury  1/14/2024 0912 by Tiffanie Flood RN  Outcome: Progressing  1/14/2024 0222 by Angi Ramon RN  Outcome: Progressing     Problem: Respiratory - Adult  Goal: Achieves optimal ventilation and oxygenation  1/14/2024 0912 by Tiffanie Flood RN  Outcome: Progressing  1/14/2024 0222 by Angi Ramon RN  Outcome: Progressing  Flowsheets (Taken 1/13/2024 2017)  Achieves optimal ventilation and oxygenation:   Assess for changes in respiratory status   Assess for changes in mentation and behavior   Position to facilitate oxygenation and minimize respiratory effort   Oxygen supplementation based on oxygen saturation or arterial blood gases   Initiate smoking cessation protocol as indicated   Encourage broncho-pulmonary hygiene including cough, deep breathe, incentive spirometry   Assess the need for suctioning and aspirate as needed   Assess and instruct to report shortness of breath or any respiratory difficulty   Respiratory therapy support as indicated     Problem: Musculoskeletal - Adult  Goal: Return mobility to safest level of function  1/14/2024 0912 by Tiffanie Flood RN  Outcome: Progressing  1/14/2024 0222 by Angi Ramon RN  Outcome: Progressing  Flowsheets (Taken 1/13/2024 2017)  Return Mobility to Safest Level of Function:   Assess patient stability and activity tolerance for standing, transferring and ambulating with or without assistive devices   Assist with transfers and ambulation

## 2024-01-14 NOTE — PLAN OF CARE
Problem: Discharge Planning  Goal: Discharge to home or other facility with appropriate resources  Outcome: Progressing  Flowsheets (Taken 1/13/2024 2017)  Discharge to home or other facility with appropriate resources:   Identify barriers to discharge with patient and caregiver   Arrange for needed discharge resources and transportation as appropriate     Problem: Safety - Adult  Goal: Free from fall injury  Outcome: Progressing     Problem: Respiratory - Adult  Goal: Achieves optimal ventilation and oxygenation  Outcome: Progressing  Flowsheets (Taken 1/13/2024 2017)  Achieves optimal ventilation and oxygenation:   Assess for changes in respiratory status   Assess for changes in mentation and behavior   Position to facilitate oxygenation and minimize respiratory effort   Oxygen supplementation based on oxygen saturation or arterial blood gases   Initiate smoking cessation protocol as indicated   Encourage broncho-pulmonary hygiene including cough, deep breathe, incentive spirometry   Assess the need for suctioning and aspirate as needed   Assess and instruct to report shortness of breath or any respiratory difficulty   Respiratory therapy support as indicated     Problem: Musculoskeletal - Adult  Goal: Return mobility to safest level of function  Outcome: Progressing  Flowsheets (Taken 1/13/2024 2017)  Return Mobility to Safest Level of Function:   Assess patient stability and activity tolerance for standing, transferring and ambulating with or without assistive devices   Assist with transfers and ambulation using safe patient handling equipment as needed   Instruct patient/family in ordered activity level  Goal: Maintain proper alignment of affected body part  Outcome: Progressing  Goal: Return ADL status to a safe level of function  Outcome: Progressing  Flowsheets (Taken 1/13/2024 2017)  Return ADL Status to a Safe Level of Function:   Administer medication as ordered   Assess activities of daily living

## 2024-01-14 NOTE — PROGRESS NOTES
Hospitalist Progress Note      PCP: Lacy Resterpo APRN    Date of Admission: 1/9/2024    Subjective: SOB improved, still on O2    Medications:  Reviewed    Infusion Medications    sodium chloride       Scheduled Medications    methylPREDNISolone  40 mg IntraVENous Q8H    budesonide  1 mg Nebulization BID RT    sodium chloride flush  5-40 mL IntraVENous 2 times per day    enoxaparin  30 mg SubCUTAneous BID    ipratropium 0.5 mg-albuterol 2.5 mg  1 Dose Inhalation Q4H WA RT     PRN Meds: melatonin, guaiFENesin-codeine, sodium chloride flush, sodium chloride, aluminum & magnesium hydroxide-simethicone, ondansetron **OR** ondansetron, nicotine polacrilex, polyethylene glycol, acetaminophen **OR** acetaminophen, albuterol      Intake/Output Summary (Last 24 hours) at 1/13/2024 1726  Last data filed at 1/13/2024 1318  Gross per 24 hour   Intake 710 ml   Output --   Net 710 ml       Physical Exam Performed:    BP (!) 145/75   Pulse 76   Temp 97.9 °F (36.6 °C) (Axillary)   Resp 18   Ht 1.702 m (5' 7\")   Wt 105.8 kg (233 lb 4 oz)   LMP 02/06/2014   SpO2 94%   BMI 36.53 kg/m²     General appearance: Chronically ill-appearing, obese  Respiratory: Increased work of breathing, on oxygen, wheezing, no appreciable crackles   Cardiovascular: Tachycardia, normal rhythm. .  Pulses intact in all extremities  GI/:  Soft, non-tender, non-distended. Normoactive bowel sounds.  Musculoskelatal:  No significant edema. Normal ROM.  Neurologic:  cranial nerves grossly intact, GCS 15, no apparent focal deficits  Psychiatric:  Alert and oriented    Labs:   Recent Labs     01/13/24  1133   WBC 20.0*   HGB 15.5   HCT 46.1   *     Recent Labs     01/13/24  1133      K 4.5   CL 99   CO2 30   BUN 18   CREATININE <0.5*   CALCIUM 9.2     No results for input(s): \"AST\", \"ALT\", \"BILIDIR\", \"BILITOT\", \"ALKPHOS\" in the last 72 hours.  No results for input(s): \"INR\" in the last 72 hours.  No results for input(s): 
      Hospitalist Progress Note      PCP: Lacy Restrepo APRN    Date of Admission: 1/9/2024    Subjective: states she feels slightly better today with SOB improved, still needing O2    Medications:  Reviewed    Infusion Medications    sodium chloride       Scheduled Medications    budesonide  1 mg Nebulization BID RT    methylPREDNISolone  40 mg IntraVENous Q6H    sodium chloride flush  5-40 mL IntraVENous 2 times per day    enoxaparin  30 mg SubCUTAneous BID    ipratropium 0.5 mg-albuterol 2.5 mg  1 Dose Inhalation Q4H WA RT     PRN Meds: sodium chloride flush, sodium chloride, aluminum & magnesium hydroxide-simethicone, ondansetron **OR** ondansetron, nicotine polacrilex, polyethylene glycol, acetaminophen **OR** acetaminophen, albuterol      Intake/Output Summary (Last 24 hours) at 1/12/2024 1419  Last data filed at 1/12/2024 0913  Gross per 24 hour   Intake 600 ml   Output --   Net 600 ml       Physical Exam Performed:    /73   Pulse 84   Temp 98 °F (36.7 °C) (Oral)   Resp 22   Ht 1.702 m (5' 7\")   Wt 105.7 kg (233 lb 0.4 oz)   LMP 02/06/2014   SpO2 90%   BMI 36.50 kg/m²     General appearance: Chronically ill-appearing, obese  Respiratory: Increased work of breathing, on oxygen, wheezing, no appreciable crackles   Cardiovascular: Tachycardia, normal rhythm. .  Pulses intact in all extremities  GI/:  Soft, non-tender, non-distended. Normoactive bowel sounds.  Musculoskelatal:  No significant edema. Normal ROM.  Neurologic:  cranial nerves grossly intact, GCS 15, no apparent focal deficits  Psychiatric:  Alert and oriented    Labs:   Recent Labs     01/09/24  1855 01/10/24  0642   WBC 13.6* 11.9*   HGB 15.2 15.4   HCT 46.2 46.5    394     Recent Labs     01/09/24  1855 01/10/24  0642    139   K 3.8 4.7    103   CO2 30 28   BUN 9 12   CREATININE 0.6 <0.5*   CALCIUM 9.8 9.3     Recent Labs     01/09/24  1855 01/10/24  0642   AST 16 14*   ALT 16 14   BILITOT <0.2 <0.2 
Discharge medications are ready in inpatient pharmacy for weekend delivery.    01/12/24 3:22 PM    
Md and RN at bedside for low o2 sats.   Pt asymptomatic with o2 sats in the high 80s.    NC increased from 2l to 5L NC with sats of 88%.    ABX and nebulizer ordered and given per MD order.     Monitor in place. Will cont to closely monitor.   
Occupational Therapy  Facility/Department: [address] MED SURG  Occupational Therapy Initial Assessment/Discharge    Name: Bibi Bullock  : 1970  MRN: [id number]  Date of Service: 1/10/2024    Discharge Recommendations:  Home with assist PRN     Bibi Bullock scored a  on the AM-PAC ADL Inpatient form.  At this time, no further OT is recommended upon discharge due to pt nearing baseline.  Recommend patient returns to prior setting with prior services.         Patient Diagnosis(es): The primary encounter diagnosis was Chronic obstructive pulmonary disease with hypoxia (HCC). Diagnoses of Hypoxemia requiring supplemental oxygen and Smoker were also pertinent to this visit.  Past Medical History:  has a past medical history of Anxiety, Asthma, COPD (chronic obstructive pulmonary disease) (HCC), Depression, Endometriosis, and Knee pain.  Past Surgical History:  has a past surgical history that includes Tonsillectomy;  section; hernia repair; Endometrial ablation; Hysterectomy; Incontinence surgery (2017); Knee arthroscopy (Right); and Carotid endarterectomy (Left, 2023).         Assessment   Performance deficits / Impairments: Decreased endurance  Assessment: Pt is a 52 yo F who presented with worsening wheezing and SOB, admitted w/ COPD exacerbation. Prior to admit, pt lives home w/ her S/O and other family where she is typically independent in functional mobility using SPC vs RW and has assist for bathing/dressing PRN. She is functioning near baseline at this time, completing functional transfers and mobility mod I using RW, but is requiring 5 to 7L supplemental O2 to maintain sats above 90%. Anticipate pt at her baseline for ADLs. OT signing off. Anticipate pt will be safe to return home w/ assist PRN when medically stable.  Decision Making: Low Complexity  REQUIRES OT FOLLOW-UP: No  Activity Tolerance  Activity Tolerance: Patient limited by fatigue  Activity Tolerance Comments: SOB 
Patient complained of not being able to breathe even after breathing treatments. Patient given an additional treatment and Perfect serve Message sent to Attending MD.     New order received for Pulmonary Consult.  
Patient on room air at rest 88%  Patient on 2 lpm at rest 91%  Patient on 2 lpm with ambulation 88%  Patient on 3 lpm with ambulation 90%    Patient qualifies for 3 lpm at this time.     SW notified of home oxygen needs.     
Personal hygiene supplies provided per pt request.    Pt resting w monitor in place.    
Pharmacy Note     Pt reports she is out of Albuterol and Trelegy.     I called [facility]. They had a script for Trelegy in July but it needed a Prior Authorization and this was never followed through.    She did get a sample from her PCP in October.    Dr Bienvenido doe'd a [facility]. It again needs a PA for 2024. I have reached out to her about who would be able to look into this with the [facility] group.     Yenny Yarbrough [facility]       1/10/24 1430  I contacted [facility] group to see if they cold assist with a PA since she saw Dr Lawson in Oct 2023. Staff was great. They have a PA dept and will look into it. I asked that they send this to our [facility]. Staff also mentions that they may have a sample inhaler to provide should there be a hold up in the filling of script. I gave her my number to call me with any information regarding the Trelegy.    Yenny Yarbrough RP     1/11/24 1400  There is a sample inhaler of Trelegy being stored in the Pharmacy until discharge; as long as it's continued for home.  Dr Lawson' office is working with [facility] on the Prior Authorization. The sample can hopefully hold her over until she gets the med or another agent is chosen.  Yenny Yarbrough [facility]   
Physical Therapy  Facility/Department: [address] MED SURG  Physical Therapy Initial Assessment    Name: Bibi Bullock  : 1970  MRN: [id number]  Date of Service: 1/10/2024    Discharge Recommendations:  Home with assist PRN          Patient Diagnosis(es): The primary encounter diagnosis was Chronic obstructive pulmonary disease with hypoxia (HCC). Diagnoses of Hypoxemia requiring supplemental oxygen and Smoker were also pertinent to this visit.  Past Medical History:  has a past medical history of Anxiety, Asthma, COPD (chronic obstructive pulmonary disease) (HCC), Depression, Endometriosis, and Knee pain.  Past Surgical History:  has a past surgical history that includes Tonsillectomy;  section; hernia repair; Endometrial ablation; Hysterectomy; Incontinence surgery (2017); Knee arthroscopy (Right); and Carotid endarterectomy (Left, 2023).    Assessment   Assessment: Bibi Bullock is a 53 y.o. female who presents to the emergency department on 24 with complaint shortness of breath and wheezing. Patient longstanding asthma/COPD. Prior to admission, pt living with sig other and multiple family members in level entry apt setting, intermittent assist with ADLs, mod I with rollator. Pt currently functioning near independent baseline - recommend moving frequently with [facility] staff in attempts to wean O2 during admission.  Decision Making: Medium Complexity  History: see above  Exam: see below  Clinical Presentation: evolving  No Skilled PT: Safe to return home  Requires PT Follow-Up: No  Activity Tolerance  Activity Tolerance: Patient tolerated treatment well     Plan   Physical Therapy Plan  General Plan: Discharge  Safety Devices  Type of Devices: Left in chair, Call light within reach, Nurse notified (recommended SBA with staff due to O2 line.)     Restrictions  Restrictions/Precautions  Restrictions/Precautions: Fall Risk  Position Activity Restriction  Other position/activity restrictions: 5L to 
Portable concentrator delivered to patient's room and patient instructed on POC use. RN at bedside  
Pt admitted this am, seen/examined today  Still not feeling better, still with SOB and wheezing  Cont solumedrol/neb/inhalers today  Will consult pulm in am if no improvement  
Pt admitted to room 4255 from [place] [facility]. pt alert and oriented x4. O2 sats 86%- 90% on 5L NC. Oriented to room and call light.  plan of care and order reviewed with pt. Fall precautions in place call light and bedside table within pt reach.  all questions answered.  
Pt discharged. Discharge instructions read and given to pt. Pt leaving with family. Awaiting medications from pharmacy.   
Pulmonary Progress Note    Date of Admission: 1/9/2024   LOS: 3 days       CC:  Chief Complaint   Patient presents with    Shortness of Breath     C/o shortness of breath and cough for 2 weeks        Subjective:  Slowly improving     ROS:   No nausea  No Vomiting  No chest pain       Assessment:     COPD with asthma moderate obstruction with large bronchodilator response  Tobacco abuse  IgE 161  Eosinophilia 1.1    Plan:     This note may have been transcribed using Dragon Dictation software. Please disregard any translational errors.       Hospital Day: 3     COPD with asthma exacerbation  Significant wheezing  High dose IV steroids with inhaled steroids  Duoneb's   Frequent exacerbations.  Could consider Fasenra   No change in regiment today      Smoking  Strongly advised needs to stop         Data:        PHYSICAL EXAM:   Blood pressure 131/73, pulse 79, temperature 98 °F (36.7 °C), temperature source Oral, resp. rate 22, height 1.702 m (5' 7\"), weight 105.7 kg (233 lb 0.4 oz), last menstrual period 02/06/2014, SpO2 92 %, not currently breastfeeding.'  Body mass index is 36.5 kg/m².   Gen: No distress.    ENT:   Resp: No accessory muscle use. No crackles. No wheezes. No rhonchi.    CV: Regular rate. Regular rhythm. No murmur or rub. No edema.   Skin: Warm, dry, normal texture and turgor. No nodule on exposed extremities.   M/S: No cyanosis. No clubbing. No joint deformity.  Psych: Oriented x 3. No anxiety.  Awake. Alert. Intact judgement and insight. Good Mood / Affect.  Memory appears in tact       Medications:    Scheduled Meds:   budesonide  1 mg Nebulization BID RT    methylPREDNISolone  40 mg IntraVENous Q6H    sodium chloride flush  5-40 mL IntraVENous 2 times per day    enoxaparin  30 mg SubCUTAneous BID    ipratropium 0.5 mg-albuterol 2.5 mg  1 Dose Inhalation Q4H WA RT       Continuous Infusions:   sodium chloride         PRN Meds:  sodium chloride flush, sodium chloride, aluminum & magnesium 
Pulmonary/Critical Care Progress Note    CC:  Follow-up:  Acute hypoxemic respiratory failure with SPO2 <90% on room air  COPD with asthma  IgE, Eosinophilia  Tobacco abuse    Subjective:  Remains on 5L NC  Has coughing spells at night  Feels better but still with wheezing and SOB with activity    ROS  + wheezing  SOB improved but not to baseline  Coughing    Intake/Output Summary (Last 24 hours) at 1/13/2024 1012  Last data filed at 1/13/2024 0554  Gross per 24 hour   Intake 350 ml   Output --   Net 350 ml         PHYSICAL EXAM:  Blood pressure (!) 145/75, pulse 72, temperature 97.9 °F (36.6 °C), temperature source Axillary, resp. rate 18, height 1.702 m (5' 7\"), weight 105.8 kg (233 lb 4 oz), last menstrual period 02/06/2014, SpO2 93 %, not currently breastfeeding.'  Gen: No distress. Well developed, well-nourished  Eyes: No scleral icterus. No conjunctival injection.   ENT: External appearance of ears and nose normal.  Neck: Trachea midline. No obvious mass. No visible thyroid enlargement     Respiratory: No crackles. Expiratory wheezing bilaterally. No rhonchi. No accessory muscle use  Cardiovascular: Regular rate. Regular rhythm. No murmur or rub.  No edema  GI: Non-tender. Non-distended. No hernia. Bowel sounds present  Skin: Warm, dry, normal texture and turgor. No abnormalities on exposed extremities.   Musculoskeletal: No cyanosis. No clubbing. No joint deformity.    Neuro: Moves all four extremities.   Psychiatric:  Normal mood and affect; exhibits normal insight and judgement     Medications:    Scheduled Meds:   budesonide  1 mg Nebulization BID RT    methylPREDNISolone  40 mg IntraVENous Q6H    sodium chloride flush  5-40 mL IntraVENous 2 times per day    enoxaparin  30 mg SubCUTAneous BID    ipratropium 0.5 mg-albuterol 2.5 mg  1 Dose Inhalation Q4H WA RT       Continuous Infusions:   sodium chloride         PRN Meds:  melatonin, guaiFENesin-codeine, sodium chloride flush, sodium chloride, aluminum & 
Pulmonary/Critical Care Progress Note    CC:  Follow-up:  Acute hypoxemic respiratory failure with SPO2 <90% on room air  COPD with asthma  IgE, Eosinophilia  Tobacco abuse    Subjective:  Weaned to 2L NC  Seems anxious to go home, does not want to stay in hospital  States she feels ready to be discharged  Wheezing and SOB subjectively improved per patient    ROS  Wheezing improved  SOB improved but not to baseline  Increasing activity    Intake/Output Summary (Last 24 hours) at 1/14/2024 1141  Last data filed at 1/14/2024 0912  Gross per 24 hour   Intake 950 ml   Output --   Net 950 ml           PHYSICAL EXAM:  Blood pressure (!) 150/72, pulse 80, temperature 97.8 °F (36.6 °C), temperature source Axillary, resp. rate 18, height 1.702 m (5' 7\"), weight 105.8 kg (233 lb 4 oz), last menstrual period 02/06/2014, SpO2 97 %, not currently breastfeeding.'  Gen: No distress. Well developed, well-nourished  Eyes: No scleral icterus. No conjunctival injection.   ENT: External appearance of ears and nose normal.  Neck: Trachea midline. No obvious mass. No visible thyroid enlargement     Respiratory: No crackles. Faint occasional wheeze, much improved from yesterday. No rhonchi. No accessory muscle use  Cardiovascular: Regular rate. Regular rhythm. No murmur or rub.  No edema  GI: Non-tender. Non-distended. No hernia. Bowel sounds present  Skin: Warm, dry, normal texture and turgor. No abnormalities on exposed extremities.   Musculoskeletal: No cyanosis. No clubbing. No joint deformity.    Neuro: Moves all four extremities.   Psychiatric:  Normal mood and affect; exhibits normal insight and judgement     Medications:    Scheduled Meds:   methylPREDNISolone  40 mg IntraVENous Q8H    budesonide  1 mg Nebulization BID RT    sodium chloride flush  5-40 mL IntraVENous 2 times per day    enoxaparin  30 mg SubCUTAneous BID    ipratropium 0.5 mg-albuterol 2.5 mg  1 Dose Inhalation Q4H WA RT       Continuous Infusions:   sodium 
Vapotherm was ordered on pt. Pt is currently on a 5 l/m non heated nasal cannula. Not requiring Heated high flow cannula. Message sent to NP to update. NP aware  
  ALT 16 14   BILITOT <0.2 <0.2   ALKPHOS 104 109     No results for input(s): \"INR\" in the last 72 hours.  Recent Labs     01/09/24  1855   TROPHS <6       Urinalysis:      Lab Results   Component Value Date/Time    NITRU Negative 10/16/2023 07:41 PM    WBCUA 0-2 10/16/2023 07:41 PM    BACTERIA 1+ 09/12/2014 05:09 PM    RBCUA 0-2 10/16/2023 07:41 PM    BLOODU TRACE-INTACT 10/16/2023 07:41 PM    SPECGRAV 1.010 10/16/2023 07:41 PM    GLUCOSEU Negative 10/16/2023 07:41 PM       Radiology:  XR CHEST PORTABLE   Final Result   1.  Central airway thickening which could reflect bronchitis or reactive   airways disease.   2. No confluent airspace consolidation.             PULMONARY REHAB EVALUATION  IP CONSULT TO SOCIAL WORK  IP CONSULT TO PULMONOLOGY    Assessment/Plan:    Active Hospital Problems    Diagnosis     SIRS (systemic inflammatory response syndrome) ([facility]) [R65.10]     Class 2 severe obesity with serious comorbidity in adult ([facility]) [E66.01]     Acute exacerbation of chronic obstructive pulmonary disease (COPD) ([facility]) [J44.1]     Acute respiratory failure with hypoxia ([facility]) [J96.01]      Acute exacerbation of chronic obstructive pulmonary disease and asthma   Steroids, DuoNebs, azithromycin.    Consulted pulm, needs meds on dc     Acute respiratory failure with hypoxia ([facility])  In setting of COPD exacerbation.  Intermittently has needed high flow.  wean as able     SIRS (systemic inflammatory response syndrome) ([facility])  Suspect acute phase reactants secondary to COPD exacerbation.  Considered other potential sources of bacterial infection contributing to SIRS, but clinical picture is not consistent with sepsis secondary to bacterial infection.     Class 2 severe obesity with serious comorbidity in adult ([facility])  Signficant comorbitiy complicating all aspects of care.      DVT Prophylaxis: lovenox  Diet: ADULT DIET; Regular  Code Status: Full Code  PT/OT Eval Status: ordered    Dispo - cont care      Marcelina Faria

## 2024-01-15 RX ORDER — FLUTICASONE PROPIONATE AND SALMETEROL XINAFOATE 230; 21 UG/1; UG/1
2 AEROSOL, METERED RESPIRATORY (INHALATION) 2 TIMES DAILY
Qty: 1 EACH | Refills: 5 | Status: SHIPPED | OUTPATIENT
Start: 2024-01-15 | End: 2025-01-09

## 2024-01-17 ENCOUNTER — TELEPHONE (OUTPATIENT)
Dept: INTERNAL MEDICINE CLINIC | Age: 54
End: 2024-01-17

## 2024-01-17 NOTE — TELEPHONE ENCOUNTER
Care Transitions Initial Follow Up Call    Outreach made within 2 business days of discharge: Yes    Patient: Bibi Bullock Patient : 1970   MRN: [id number]  Reason for Admission: There are no discharge diagnoses documented for the most recent discharge.  Discharge Date: 24       Spoke with: PT    Discharge department/facility: [facility] Interactive Patient Contact:  Was patient able to fill all prescriptions: Yes  Was patient instructed to bring all medications to the follow-up visit: Yes  Is patient taking all medications as directed in the discharge summary? Yes  Does patient understand their discharge instructions: Yes  Does patient have questions or concerns that need addressed prior to 7-14 day follow up office visit: no    Scheduled appointment with PCP within 7-14 days    Follow Up  Future Appointments   Date Time Provider Department Center   2024  9:00 AM Lacy Restrepo APRN [facility]   2024 12:40 PM Jarrell Lawson MD [facility]       Marta Rodriguez MA

## 2024-01-19 ENCOUNTER — OFFICE VISIT (OUTPATIENT)
Dept: INTERNAL MEDICINE CLINIC | Age: 54
End: 2024-01-19
Payer: MEDICAID

## 2024-01-19 VITALS
HEART RATE: 80 BPM | DIASTOLIC BLOOD PRESSURE: 88 MMHG | WEIGHT: 240.13 LBS | TEMPERATURE: 98.4 F | SYSTOLIC BLOOD PRESSURE: 138 MMHG | BODY MASS INDEX: 37.61 KG/M2 | OXYGEN SATURATION: 96 %

## 2024-01-19 DIAGNOSIS — J44.9 CHRONIC OBSTRUCTIVE PULMONARY DISEASE WITH HYPOXIA (HCC): ICD-10-CM

## 2024-01-19 DIAGNOSIS — D72.829 LEUKOCYTOSIS, UNSPECIFIED TYPE: ICD-10-CM

## 2024-01-19 DIAGNOSIS — R09.02 CHRONIC OBSTRUCTIVE PULMONARY DISEASE WITH HYPOXIA (HCC): ICD-10-CM

## 2024-01-19 DIAGNOSIS — Z72.0 TOBACCO ABUSE: ICD-10-CM

## 2024-01-19 DIAGNOSIS — Z09 HOSPITAL DISCHARGE FOLLOW-UP: Primary | ICD-10-CM

## 2024-01-19 PROCEDURE — 1111F DSCHRG MED/CURRENT MED MERGE: CPT | Performed by: NURSE PRACTITIONER

## 2024-01-19 PROCEDURE — 99215 OFFICE O/P EST HI 40 MIN: CPT | Performed by: NURSE PRACTITIONER

## 2024-01-19 ASSESSMENT — PATIENT HEALTH QUESTIONNAIRE - PHQ9
SUM OF ALL RESPONSES TO PHQ QUESTIONS 1-9: 0
1. LITTLE INTEREST OR PLEASURE IN DOING THINGS: 0
SUM OF ALL RESPONSES TO PHQ QUESTIONS 1-9: 0
5. POOR APPETITE OR OVEREATING: 0
3. TROUBLE FALLING OR STAYING ASLEEP: 0
SUM OF ALL RESPONSES TO PHQ QUESTIONS 1-9: 0
2. FEELING DOWN, DEPRESSED OR HOPELESS: 0
9. THOUGHTS THAT YOU WOULD BE BETTER OFF DEAD, OR OF HURTING YOURSELF: 0
4. FEELING TIRED OR HAVING LITTLE ENERGY: 0
SUM OF ALL RESPONSES TO PHQ QUESTIONS 1-9: 0
6. FEELING BAD ABOUT YOURSELF - OR THAT YOU ARE A FAILURE OR HAVE LET YOURSELF OR YOUR FAMILY DOWN: 0
SUM OF ALL RESPONSES TO PHQ9 QUESTIONS 1 & 2: 0
7. TROUBLE CONCENTRATING ON THINGS, SUCH AS READING THE NEWSPAPER OR WATCHING TELEVISION: 0
10. IF YOU CHECKED OFF ANY PROBLEMS, HOW DIFFICULT HAVE THESE PROBLEMS MADE IT FOR YOU TO DO YOUR WORK, TAKE CARE OF THINGS AT HOME, OR GET ALONG WITH OTHER PEOPLE: 0
8. MOVING OR SPEAKING SO SLOWLY THAT OTHER PEOPLE COULD HAVE NOTICED. OR THE OPPOSITE, BEING SO FIGETY OR RESTLESS THAT YOU HAVE BEEN MOVING AROUND A LOT MORE THAN USUAL: 0

## 2024-01-19 ASSESSMENT — ENCOUNTER SYMPTOMS
DIARRHEA: 0
SHORTNESS OF BREATH: 1
NAUSEA: 0
WHEEZING: 1
VOMITING: 0
COUGH: 1

## 2024-01-19 NOTE — PROGRESS NOTES
Post-Discharge Transitional Care Follow Up      Bibi Bullock   YOB: 1970    Date of Office Visit:  1/19/2024  Date of Hospital Admission: 1/9/24  Date of Hospital Discharge: 1/14/24  Readmission Risk Score (high >=14%. Medium >=10%):Readmission Risk Score: 16.4      Care management risk score Rising risk (score 2-5) and Complex Care (Scores >=6): No Risk Score On File     Non face to face  following discharge, date last encounter closed (first attempt may have been earlier): 01/17/2024     Call initiated 2 business days of discharge: Yes     Hospital discharge follow-up  -     NE DISCHARGE MEDS RECONCILED W/ CURRENT OUTPATIENT MED LIST  Leukocytosis, unspecified type: suspect 2/2 high dose steroids. CBC two weeks.   -     CBC with Auto Differential; Future  Chronic obstructive pulmonary disease with hypoxia (HCC): lungs CTA today. Completing prednisone taper. Continue advair, PRN albuterol.   -     DME Order for Walker as OP  Tobacco abuse: cessation strongly advised. Pt aware not to smoke near oxygen.     Medical Decision Making: high complexity  Return in about 4 weeks (around 2/16/2024), or if symptoms worsen or fail to improve.         Subjective:   HPI    Inpatient course: Discharge summary reviewed- see chart.    Patient was admitted for COPD exacerbation, acute hypoxic respiratory failure.  She was treated with steroids, DuoNebs and azithromycin.  There was some concern for SIRS however it was suspected that elevated acute phase reactants were due to her COPD exacerbation.  Chest x-ray showed changes reflective of bronchitis or reactive airway disease, no airspace consolidation.  COVID and flu testing negative.  BNP was normal.  She was seen by pulmonology.  She was discharged on 2-3 L oxygen.    Interval history/Current status:     Since hospital discharge she has been feeling better since hospital discharge. Activity tolerance is low. Denies fever/chills.  She is completing prednisone

## 2024-01-19 NOTE — PATIENT INSTRUCTIONS
Will send order for walker to [facility] let me know if any issue with this   Blood work in two weeks, schedule this with the office

## 2024-01-24 ENCOUNTER — APPOINTMENT (OUTPATIENT)
Dept: CT IMAGING | Age: 54
End: 2024-01-24
Payer: MEDICAID

## 2024-01-24 ENCOUNTER — APPOINTMENT (OUTPATIENT)
Dept: GENERAL RADIOLOGY | Age: 54
End: 2024-01-24
Payer: MEDICAID

## 2024-01-24 ENCOUNTER — HOSPITAL ENCOUNTER (OUTPATIENT)
Age: 54
Setting detail: OBSERVATION
Discharge: HOME | End: 2024-01-26
Attending: EMERGENCY MEDICINE | Admitting: HOSPITALIST
Payer: MEDICAID

## 2024-01-24 DIAGNOSIS — R53.1 WEAKNESS OF LEFT SIDE OF BODY: Primary | ICD-10-CM

## 2024-01-24 DIAGNOSIS — H53.122 TRANSIENT VISUAL LOSS, LEFT EYE: ICD-10-CM

## 2024-01-24 LAB
ALBUMIN SERPL-MCNC: 3.7 G/DL (ref 3.4–5)
ALBUMIN/GLOB SERPL: 1.2 {RATIO} (ref 1.1–2.2)
ALP SERPL-CCNC: 70 U/L (ref 40–129)
ALT SERPL-CCNC: 20 U/L (ref 10–40)
ANION GAP SERPL CALCULATED.3IONS-SCNC: 12 MMOL/L (ref 3–16)
AST SERPL-CCNC: 21 U/L (ref 15–37)
BASOPHILS # BLD: 0.2 K/UL (ref 0–0.2)
BASOPHILS NFR BLD: 1 %
BILIRUB SERPL-MCNC: 0.3 MG/DL (ref 0–1)
BUN SERPL-MCNC: 19 MG/DL (ref 7–20)
CALCIUM SERPL-MCNC: 9.4 MG/DL (ref 8.3–10.6)
CHLORIDE SERPL-SCNC: 102 MMOL/L (ref 99–110)
CO2 SERPL-SCNC: 26 MMOL/L (ref 21–32)
CREAT SERPL-MCNC: 0.9 MG/DL (ref 0.6–1.1)
DEPRECATED RDW RBC AUTO: 14.5 % (ref 12.4–15.4)
EOSINOPHIL # BLD: 0 K/UL (ref 0–0.6)
EOSINOPHIL NFR BLD: 0 %
GFR SERPLBLD CREATININE-BSD FMLA CKD-EPI: >60 ML/MIN/{1.73_M2}
GLUCOSE BLD-MCNC: 152 MG/DL (ref 70–99)
GLUCOSE SERPL-MCNC: 142 MG/DL (ref 70–99)
HCT VFR BLD AUTO: 44.5 % (ref 36–48)
HGB BLD-MCNC: 14.7 G/DL (ref 12–16)
INR PPP: 0.9 (ref 0.84–1.16)
LYMPHOCYTES # BLD: 4.8 K/UL (ref 1–5.1)
LYMPHOCYTES NFR BLD: 30 %
MCH RBC QN AUTO: 29.4 PG (ref 26–34)
MCHC RBC AUTO-ENTMCNC: 33.2 G/DL (ref 31–36)
MCV RBC AUTO: 88.6 FL (ref 80–100)
MONOCYTES # BLD: 0.6 K/UL (ref 0–1.3)
MONOCYTES NFR BLD: 4 %
NEUTROPHILS # BLD: 10.3 K/UL (ref 1.7–7.7)
NEUTROPHILS NFR BLD: 65 %
PERFORMED ON: ABNORMAL
PLATELET # BLD AUTO: 410 K/UL (ref 135–450)
PMV BLD AUTO: 8.1 FL (ref 5–10.5)
POTASSIUM SERPL-SCNC: 3.8 MMOL/L (ref 3.5–5.1)
PROT SERPL-MCNC: 6.7 G/DL (ref 6.4–8.2)
PROTHROMBIN TIME: 12.2 SEC (ref 11.5–14.8)
RBC # BLD AUTO: 5.02 M/UL (ref 4–5.2)
SODIUM SERPL-SCNC: 140 MMOL/L (ref 136–145)
TROPONIN, HIGH SENSITIVITY: <6 NG/L (ref 0–14)
WBC # BLD AUTO: 15.9 K/UL (ref 4–11)

## 2024-01-24 PROCEDURE — 70498 CT ANGIOGRAPHY NECK: CPT

## 2024-01-24 PROCEDURE — 85025 COMPLETE CBC W/AUTO DIFF WBC: CPT

## 2024-01-24 PROCEDURE — 84484 ASSAY OF TROPONIN QUANT: CPT

## 2024-01-24 PROCEDURE — 80053 COMPREHEN METABOLIC PANEL: CPT

## 2024-01-24 PROCEDURE — 6360000004 HC RX CONTRAST MEDICATION: Performed by: EMERGENCY MEDICINE

## 2024-01-24 PROCEDURE — 85610 PROTHROMBIN TIME: CPT

## 2024-01-24 PROCEDURE — 99285 EMERGENCY DEPT VISIT HI MDM: CPT

## 2024-01-24 PROCEDURE — 36415 COLL VENOUS BLD VENIPUNCTURE: CPT

## 2024-01-24 PROCEDURE — 70450 CT HEAD/BRAIN W/O DYE: CPT

## 2024-01-24 PROCEDURE — 93005 ELECTROCARDIOGRAM TRACING: CPT | Performed by: EMERGENCY MEDICINE

## 2024-01-24 PROCEDURE — 71045 X-RAY EXAM CHEST 1 VIEW: CPT

## 2024-01-25 ENCOUNTER — APPOINTMENT (OUTPATIENT)
Dept: MRI IMAGING | Age: 54
End: 2024-01-25
Payer: MEDICAID

## 2024-01-25 DIAGNOSIS — F41.9 ANXIETY: ICD-10-CM

## 2024-01-25 DIAGNOSIS — F32.A DEPRESSION, UNSPECIFIED DEPRESSION TYPE: ICD-10-CM

## 2024-01-25 PROBLEM — I69.392 CVA, OLD, FACIAL WEAKNESS: Status: ACTIVE | Noted: 2024-01-25

## 2024-01-25 LAB
BILIRUB UR QL STRIP.AUTO: NEGATIVE
CLARITY UR: CLEAR
COLOR UR: YELLOW
EKG ATRIAL RATE: 94 BPM
EKG DIAGNOSIS: NORMAL
EKG P AXIS: 50 DEGREES
EKG P-R INTERVAL: 166 MS
EKG Q-T INTERVAL: 344 MS
EKG QRS DURATION: 86 MS
EKG QTC CALCULATION (BAZETT): 430 MS
EKG R AXIS: -46 DEGREES
EKG T AXIS: 64 DEGREES
EKG VENTRICULAR RATE: 94 BPM
GLUCOSE BLD-MCNC: 155 MG/DL (ref 70–99)
GLUCOSE UR STRIP.AUTO-MCNC: NEGATIVE MG/DL
HGB UR QL STRIP.AUTO: NEGATIVE
KETONES UR STRIP.AUTO-MCNC: NEGATIVE MG/DL
LEUKOCYTE ESTERASE UR QL STRIP.AUTO: NEGATIVE
NITRITE UR QL STRIP.AUTO: NEGATIVE
PERFORMED ON: ABNORMAL
PH UR STRIP.AUTO: 5 [PH] (ref 5–8)
PROT UR STRIP.AUTO-MCNC: NEGATIVE MG/DL
SP GR UR STRIP.AUTO: 1.02 (ref 1–1.03)
UA COMPLETE W REFLEX CULTURE PNL UR: NORMAL
UA DIPSTICK W REFLEX MICRO PNL UR: NORMAL
URN SPEC COLLECT METH UR: NORMAL
UROBILINOGEN UR STRIP-ACNC: 1 E.U./DL

## 2024-01-25 PROCEDURE — 6370000000 HC RX 637 (ALT 250 FOR IP): Performed by: HOSPITALIST

## 2024-01-25 PROCEDURE — 97535 SELF CARE MNGMENT TRAINING: CPT

## 2024-01-25 PROCEDURE — 70551 MRI BRAIN STEM W/O DYE: CPT

## 2024-01-25 PROCEDURE — 6360000004 HC RX CONTRAST MEDICATION: Performed by: HOSPITALIST

## 2024-01-25 PROCEDURE — 6360000002 HC RX W HCPCS: Performed by: HOSPITALIST

## 2024-01-25 PROCEDURE — 97161 PT EVAL LOW COMPLEX 20 MIN: CPT | Performed by: PHYSICAL THERAPIST

## 2024-01-25 PROCEDURE — 94761 N-INVAS EAR/PLS OXIMETRY MLT: CPT

## 2024-01-25 PROCEDURE — 2700000000 HC OXYGEN THERAPY PER DAY

## 2024-01-25 PROCEDURE — 6370000000 HC RX 637 (ALT 250 FOR IP): Performed by: FAMILY MEDICINE

## 2024-01-25 PROCEDURE — 72141 MRI NECK SPINE W/O DYE: CPT

## 2024-01-25 PROCEDURE — G0378 HOSPITAL OBSERVATION PER HR: HCPCS

## 2024-01-25 PROCEDURE — 81003 URINALYSIS AUTO W/O SCOPE: CPT

## 2024-01-25 PROCEDURE — 6360000002 HC RX W HCPCS: Performed by: FAMILY MEDICINE

## 2024-01-25 PROCEDURE — 96365 THER/PROPH/DIAG IV INF INIT: CPT

## 2024-01-25 PROCEDURE — 97116 GAIT TRAINING THERAPY: CPT | Performed by: PHYSICAL THERAPIST

## 2024-01-25 PROCEDURE — 93010 ELECTROCARDIOGRAM REPORT: CPT | Performed by: INTERNAL MEDICINE

## 2024-01-25 PROCEDURE — 94640 AIRWAY INHALATION TREATMENT: CPT

## 2024-01-25 PROCEDURE — 2580000003 HC RX 258: Performed by: HOSPITALIST

## 2024-01-25 PROCEDURE — 96372 THER/PROPH/DIAG INJ SC/IM: CPT

## 2024-01-25 PROCEDURE — 97166 OT EVAL MOD COMPLEX 45 MIN: CPT

## 2024-01-25 PROCEDURE — 6370000000 HC RX 637 (ALT 250 FOR IP): Performed by: EMERGENCY MEDICINE

## 2024-01-25 RX ORDER — POLYETHYLENE GLYCOL 3350 17 G/17G
17 POWDER, FOR SOLUTION ORAL DAILY PRN
Status: DISCONTINUED | OUTPATIENT
Start: 2024-01-25 | End: 2024-01-26 | Stop reason: HOSPADM

## 2024-01-25 RX ORDER — ONDANSETRON 2 MG/ML
4 INJECTION INTRAMUSCULAR; INTRAVENOUS EVERY 6 HOURS PRN
Status: DISCONTINUED | OUTPATIENT
Start: 2024-01-25 | End: 2024-01-26 | Stop reason: HOSPADM

## 2024-01-25 RX ORDER — ASPIRIN 81 MG/1
81 TABLET, CHEWABLE ORAL DAILY
Status: DISCONTINUED | OUTPATIENT
Start: 2024-01-25 | End: 2024-01-26 | Stop reason: HOSPADM

## 2024-01-25 RX ORDER — PREDNISONE 20 MG/1
40 TABLET ORAL DAILY
Status: DISCONTINUED | OUTPATIENT
Start: 2024-01-25 | End: 2024-01-26 | Stop reason: HOSPADM

## 2024-01-25 RX ORDER — LORAZEPAM 1 MG/1
1 TABLET ORAL ONCE
Status: COMPLETED | OUTPATIENT
Start: 2024-01-25 | End: 2024-01-25

## 2024-01-25 RX ORDER — LEVOFLOXACIN 5 MG/ML
750 INJECTION, SOLUTION INTRAVENOUS EVERY 24 HOURS
Status: DISCONTINUED | OUTPATIENT
Start: 2024-01-25 | End: 2024-01-26 | Stop reason: HOSPADM

## 2024-01-25 RX ORDER — CLOPIDOGREL BISULFATE 75 MG/1
75 TABLET ORAL DAILY
Status: DISCONTINUED | OUTPATIENT
Start: 2024-01-25 | End: 2024-01-26 | Stop reason: HOSPADM

## 2024-01-25 RX ORDER — SODIUM CHLORIDE 0.9 % (FLUSH) 0.9 %
5-40 SYRINGE (ML) INJECTION EVERY 12 HOURS SCHEDULED
Status: DISCONTINUED | OUTPATIENT
Start: 2024-01-25 | End: 2024-01-26 | Stop reason: HOSPADM

## 2024-01-25 RX ORDER — BENZONATATE 100 MG/1
100 CAPSULE ORAL ONCE
Status: COMPLETED | OUTPATIENT
Start: 2024-01-25 | End: 2024-01-25

## 2024-01-25 RX ORDER — GUAIFENESIN/DEXTROMETHORPHAN 100-10MG/5
5 SYRUP ORAL EVERY 4 HOURS PRN
Status: DISCONTINUED | OUTPATIENT
Start: 2024-01-25 | End: 2024-01-26 | Stop reason: HOSPADM

## 2024-01-25 RX ORDER — LABETALOL HYDROCHLORIDE 5 MG/ML
10 INJECTION, SOLUTION INTRAVENOUS EVERY 10 MIN PRN
Status: DISCONTINUED | OUTPATIENT
Start: 2024-01-25 | End: 2024-01-26 | Stop reason: HOSPADM

## 2024-01-25 RX ORDER — SERTRALINE HYDROCHLORIDE 100 MG/1
100 TABLET, FILM COATED ORAL DAILY
Status: DISCONTINUED | OUTPATIENT
Start: 2024-01-26 | End: 2024-01-26 | Stop reason: HOSPADM

## 2024-01-25 RX ORDER — ENOXAPARIN SODIUM 100 MG/ML
40 INJECTION SUBCUTANEOUS DAILY
Status: DISCONTINUED | OUTPATIENT
Start: 2024-01-25 | End: 2024-01-25 | Stop reason: DRUGHIGH

## 2024-01-25 RX ORDER — ENOXAPARIN SODIUM 100 MG/ML
30 INJECTION SUBCUTANEOUS 2 TIMES DAILY
Status: DISCONTINUED | OUTPATIENT
Start: 2024-01-25 | End: 2024-01-26 | Stop reason: HOSPADM

## 2024-01-25 RX ORDER — ATORVASTATIN CALCIUM 80 MG/1
80 TABLET, FILM COATED ORAL NIGHTLY
Status: DISCONTINUED | OUTPATIENT
Start: 2024-01-25 | End: 2024-01-26 | Stop reason: HOSPADM

## 2024-01-25 RX ORDER — SERTRALINE HYDROCHLORIDE 100 MG/1
100 TABLET, FILM COATED ORAL DAILY
Qty: 90 TABLET | Refills: 1 | Status: SHIPPED | OUTPATIENT
Start: 2024-01-25

## 2024-01-25 RX ORDER — ONDANSETRON 4 MG/1
4 TABLET, ORALLY DISINTEGRATING ORAL EVERY 8 HOURS PRN
Status: DISCONTINUED | OUTPATIENT
Start: 2024-01-25 | End: 2024-01-26 | Stop reason: HOSPADM

## 2024-01-25 RX ORDER — IPRATROPIUM BROMIDE AND ALBUTEROL SULFATE 2.5; .5 MG/3ML; MG/3ML
1 SOLUTION RESPIRATORY (INHALATION)
Status: DISCONTINUED | OUTPATIENT
Start: 2024-01-25 | End: 2024-01-26 | Stop reason: HOSPADM

## 2024-01-25 RX ORDER — SODIUM CHLORIDE 9 MG/ML
INJECTION, SOLUTION INTRAVENOUS PRN
Status: DISCONTINUED | OUTPATIENT
Start: 2024-01-25 | End: 2024-01-26 | Stop reason: HOSPADM

## 2024-01-25 RX ORDER — SODIUM CHLORIDE 0.9 % (FLUSH) 0.9 %
5-40 SYRINGE (ML) INJECTION PRN
Status: DISCONTINUED | OUTPATIENT
Start: 2024-01-25 | End: 2024-01-26 | Stop reason: HOSPADM

## 2024-01-25 RX ADMIN — GUAIFENESIN SYRUP AND DEXTROMETHORPHAN 5 ML: 100; 10 SYRUP ORAL at 10:24

## 2024-01-25 RX ADMIN — IPRATROPIUM BROMIDE AND ALBUTEROL SULFATE 1 DOSE: .5; 2.5 SOLUTION RESPIRATORY (INHALATION) at 20:47

## 2024-01-25 RX ADMIN — Medication 10 ML: at 21:34

## 2024-01-25 RX ADMIN — Medication 10 ML: at 06:13

## 2024-01-25 RX ADMIN — LORAZEPAM 1 MG: 1 TABLET ORAL at 14:46

## 2024-01-25 RX ADMIN — BENZONATATE 100 MG: 100 CAPSULE ORAL at 02:08

## 2024-01-25 RX ADMIN — PERFLUTREN 1.5 ML: 6.52 INJECTION, SUSPENSION INTRAVENOUS at 13:47

## 2024-01-25 RX ADMIN — LEVOFLOXACIN 750 MG: 5 INJECTION, SOLUTION INTRAVENOUS at 21:32

## 2024-01-25 RX ADMIN — ENOXAPARIN SODIUM 30 MG: 100 INJECTION SUBCUTANEOUS at 10:24

## 2024-01-25 RX ADMIN — ASPIRIN 81 MG 81 MG: 81 TABLET ORAL at 10:24

## 2024-01-25 RX ADMIN — CLOPIDOGREL BISULFATE 75 MG: 75 TABLET ORAL at 10:23

## 2024-01-25 RX ADMIN — ENOXAPARIN SODIUM 30 MG: 100 INJECTION SUBCUTANEOUS at 21:29

## 2024-01-25 RX ADMIN — ATORVASTATIN CALCIUM 80 MG: 80 TABLET, FILM COATED ORAL at 21:29

## 2024-01-25 RX ADMIN — Medication 10 ML: at 14:49

## 2024-01-25 RX ADMIN — MOMETASONE FUROATE AND FORMOTEROL FUMARATE DIHYDRATE 2 PUFF: 200; 5 AEROSOL RESPIRATORY (INHALATION) at 20:47

## 2024-01-25 RX ADMIN — PREDNISONE 40 MG: 20 TABLET ORAL at 21:29

## 2024-01-25 ASSESSMENT — PAIN SCALES - GENERAL
PAINLEVEL_OUTOF10: 0

## 2024-01-25 NOTE — PROGRESS NOTES
Physical Therapy  Facility/Department: [address] PROGRESSIVE CARE  Physical Therapy Initial Assessment/Discharge Note    Name: Bibi Bullock  : 1970  MRN: [id number]  Date of Service: 2024    Discharge Recommendations:  Home with assist PRN   PT Equipment Recommendations  Equipment Needed: No      Bibi Bullock scored a 23/24 on the AM-PAC short mobility form.  At this time, no further PT is recommended upon discharge.  Recommend patient returns to prior setting with prior services.       Patient Diagnosis(es): The primary encounter diagnosis was Weakness of left side of body. A diagnosis of Transient visual loss, left eye was also pertinent to this visit.  Past Medical History:  has a past medical history of Anxiety, Asthma, COPD (chronic obstructive pulmonary disease) (HCC), Depression, Endometriosis, and Knee pain.  Past Surgical History:  has a past surgical history that includes Tonsillectomy;  section; hernia repair; Endometrial ablation; Hysterectomy; Incontinence surgery (2017); Knee arthroscopy (Right); and Carotid endarterectomy (Left, 2023).    Assessment   Assessment: pt is a 54 yo female who was adm to hosp with intermittent episodes of left eye blurriness and left-sided arm and leg heaviness. Pt at baseline is Ind with using a SPC in her apt and a RW out in community; Pt appears to be at her baseline and will be safe to return home with PRN assist from family as prior.  No further therapy indicated  Therapy Prognosis: Good  Decision Making: Low Complexity  Requires PT Follow-Up: No  Activity Tolerance  Activity Tolerance: Patient tolerated evaluation without incident;Patient tolerated treatment well     Plan   Physical Therapy Plan  Additional Comments: no further therapy recommended  Safety Devices  Type of Devices: Left in bed, Call light within reach, Chair alarm in place     Restrictions  Position Activity Restriction  Other position/activity restrictions: O2 3L via NC

## 2024-01-25 NOTE — H&P
V2.0  History and Physical      Name:  Bibi Bullock /Age/Sex: 1970  (53 y.o. female)   MRN & CSN:  [id number] & [id number] Encounter Date/Time: 2024 3:44 PM EST   Location:  [id number] PCP: Lacy Restrepo APRN       Hospital Day: 2    Assessment and Plan:   Bibi Bullock is a 53 y.o. female who presents with CVA, old, facial weakness    Hospital Problems             Last Modified POA    * (Principal) CVA, old, facial weakness 2024 Yes       Plan:    Intermittent left eye blindness  History of left-sided neurodeficits from prior stroke  -Telemetry  -Aspirin statin  -PT OT  -Brain MRI    Chronic respiratory failure  -3 L oxygen at baseline    COPD exacerbation  -Start DuoNebs and IV antibiotics  - prednisone burst  -Change Advair to Symbicort    Disposition:   Current Living situation: home  Expected Disposition: home  Estimated D/C: 1-2 days    Diet ADULT DIET; Regular   DVT Prophylaxis [] Lovenox, []  Heparin, [] SCDs, [] Ambulation,  [] Eliquis, [] Xarelto, [] Coumadin   Code Status Full Code   Surrogate Decision Maker/ POA In the chart     Personally reviewed Lab Studies and Imaging     Discussed management of the case with ED provider who recommended admission to [facility]          History from:     patient    History of Present Illness:     Chief Complaint: left eye visual disturbance    Bibi Bullock is a 53 y.o. female with pmh of prior stroke with left-sided residual deficits who presents with several day history of intermittent left eye blurriness and vision loss.  She also says that since her prior stroke she used to have them but has not had them for some time until recently.  In the ED she is at her baseline 3 L oxygen use and other vital signs reassuring.  Labs also reassuring.  She has severe stenoses and intracranial and internal carotid arteries that appear chronic and stable per radiology.    As well, patient states that the last couple days she has had increased  weight based adjustment of the mA/kV was utilized to reduce the radiation dose to as low as reasonably achievable. COMPARISON: None. HISTORY: ORDERING SYSTEM PROVIDED HISTORY: Stroke Symptoms TECHNOLOGIST PROVIDED HISTORY: Has a \"code stroke\" or \"stroke alert\" been called?->No Reason for exam:->Stroke Symptoms Decision Support Exception - unselect if not a suspected or confirmed emergency medical condition->Emergency Medical Condition (MA) Reason for Exam: Stroke Symptoms Additional signs and symptoms: Eye Problem -Pt presents to ED with c/o left eye blurriness and left sided heaviness.  States she has a history of strokes and she has been having intermittent blurry/loss of vision in her left eye and that is accompanied by the heaviness and weakness on her left side. Denies blood thinner use.  Able to stand and pivot to bed. Relevant Medical/Surgical History: Current every day smoker.  Hx of previous stroke(s), tonsillectomy & carotid endarterectomy.  No hx of any cancer. FINDINGS: BRAIN/VENTRICLES: There is no acute intracranial hemorrhage, mass effect or midline shift.  No abnormal extra-axial fluid collection.  The gray-white differentiation is maintained without evidence of an acute infarct.  There is no evidence of hydrocephalus. ORBITS: The visualized portion of the orbits demonstrate no acute abnormality. SINUSES: Mild diffuse paranasal sinus mucoperiosteal thickening with scattered air-fluid levels suggesting sinusitis.  No mastoid effusion. SOFT TISSUES/SKULL:  No acute abnormality of the visualized skull or soft tissues.     1.  No acute intracranial abnormality. 2. Diffuse paranasal sinus disease. The findings were sent to the Radiology Results Communication Center at 10:00 pm on 1/24/2024 to be communicated to a licensed caregiver.         Electronically signed by Carmen Fleming MD on 1/25/2024 at 3:44 PM

## 2024-01-25 NOTE — PROGRESS NOTES
Occupational Therapy  Facility/Department: [address] PROGRESSIVE CARE  Occupational Therapy Initial Assessment and Discharge Summary    Name: Bibi Bullock  : 1970  MRN: [id number]  Date of Service: 2024    Discharge Recommendations:  Defer OT at this time, Home with assist PRN          Patient Diagnosis(es): The primary encounter diagnosis was Weakness of left side of body. A diagnosis of Transient visual loss, left eye was also pertinent to this visit.  Past Medical History:  has a past medical history of Anxiety, Asthma, COPD (chronic obstructive pulmonary disease) (HCC), Depression, Endometriosis, and Knee pain.  Past Surgical History:  has a past surgical history that includes Tonsillectomy;  section; hernia repair; Endometrial ablation; Hysterectomy; Incontinence surgery (2017); Knee arthroscopy (Right); and Carotid endarterectomy (Left, 2023).           Assessment   Assessment: Presents with weakness and stroke workup ongoing.  Pt lives with family and was independent with self care.  Pt able to demonstrate ability to complete self care and funct mobility and is safe for d/c home.  No acute OT goals identified.  Prognosis: Good  Decision Making: Medium Complexity  History: See above  Exam: mobility, self care  Assistance / Modification: RW  REQUIRES OT FOLLOW-UP: No  Activity Tolerance  Activity Tolerance: Patient Tolerated treatment well        Plan   Occupational Therapy Plan  Times Per Week: NA, no acute OT goals identified     Restrictions  Position Activity Restriction  Other position/activity restrictions: O2 3L via NC    Subjective   General  Chart Reviewed: Yes, Orders, Progress Notes, History and Physical  Additional Pertinent Hx: Per Dr. Maharaj, \"Bibi Bullock is a 53 y.o. female who presents via private vehicle from home for evaluation of intermittent episodes of left eye blurriness and left-sided arm and leg heaviness.  Patient states her symptoms started yesterday  evening at 2000.  Patient states that she will notice intermittent episodes where her vision in her left eye will start to go dark especially if she is up and moving around but then the vision will come back when she is resting.  Patient states she also has a history of previous CVA with left-sided arm and leg weakness at baseline but states that she has noticed that the weakness on the left side is felt more pronounced over the past 24 hours.  States that the left arm and leg will feel very heavy when she is up and moving around.  Patient denies any headache.  Denies any neck pain or stiffness.  No chest pain or shortness of breath.  History of COPD and is on chronic nasal cannula oxygen.  Denies any abdominal pain.  No nausea or vomiting.  No falls or trauma.  States she feels unsteady on her feet when walking which is new over the past 24 hours.  No fevers or chills.  Normal urinary and bowel habits.  States she takes aspirin oral 81 mg daily\"  Family / Caregiver Present: No  Referring Practitioner: Dr. Plummer  Diagnosis: left side weakness  Subjective  Subjective: Pt seen in room, agreed to OT/PT, no complaints, no weakness currently.     Social/Functional History  Social/Functional History  Lives With: Family (SO and brother and his children)  Type of Home: Apartment  Home Layout: One level  Home Access: Level entry  Bathroom Shower/Tub: Tub/Shower unit  Bathroom Toilet: Standard  Home Equipment: Walker, rolling, Cane  Has the patient had two or more falls in the past year or any fall with injury in the past year?: No  ADL Assistance: Needs assistance (dtr supervises)  Ambulation Assistance: Needs assistance (with SPC)  Transfer Assistance: Independent  Active : No  Patient's  Info: boyfriend and brother  Occupation: On disability       Objective           Safety Devices  Type of Devices: Left in bed;Call light within reach;Chair alarm in place     Toilet Transfers  Toilet - Technique:

## 2024-01-25 NOTE — PROGRESS NOTES
4 Eyes Skin Assessment     NAME:  Bibi Bullock  YOB: 1970  MEDICAL RECORD NUMBER:  [id number]    The patient is being assessed for  Admission    I agree that at least one RN has performed a thorough Head to Toe Skin Assessment on the patient. ALL assessment sites listed below have been assessed.      Areas assessed by both nurses:    Head, Face, Ears, Shoulders, Back, Chest, Arms, Elbows, Hands, Sacrum. Buttock, Coccyx, Ischium, Legs. Feet and Heels, and Under Medical Devices         Does the Patient have a Wound? No noted wound(s)       Urban Prevention initiated by RN: Yes  Wound Care Orders initiated by RN: No    Pressure Injury (Stage 3,4, Unstageable, DTI, NWPT, and Complex wounds) if present, place Wound referral order by RN under : No    New Ostomies, if present place, Ostomy referral order under : No     Nurse 1 eSignature: Electronically signed by Cata Calloway RN on 1/25/24 at 7:54 AM EST    **SHARE this note so that the co-signing nurse can place an eSignature**    Nurse 2 eSignature: Electronically signed by Jyoti Soares RN on 1/25/24 at 10:56 AM EST

## 2024-01-25 NOTE — ED PROVIDER NOTES
[facility]     EMERGENCY DEPARTMENT ENCOUNTER            Pt Name: Bibi Bullock   MRN: [id number]   Birthdate 1970   Date of evaluation: 1/24/2024   Provider: Jan Maharaj MD   PCP: Lacy Restrepo APRN   Note Started: 9:39 PM EST 1/24/24       CHIEF COMPLAINT     Chief Complaint   Patient presents with    Eye Problem     Pt presents to ED with c/o left eye blurriness and left sided heaviness.  States she has a history of strokes and she has been having intermittent blurry/loss of vision in her left eye and that is accompanied by the heaviness and weakness on her left side.  Denies blood thinner use.  Able to stand and pivot to bed.          HISTORY OF PRESENT ILLNESS:   History from : Patient   Limitations to history : None     Bibi Bullock is a 53 y.o. female who presents via private vehicle from home for evaluation of intermittent episodes of left eye blurriness and left-sided arm and leg heaviness.  Patient states her symptoms started yesterday evening at 2000.  Patient states that she will notice intermittent episodes where her vision in her left eye will start to go dark especially if she is up and moving around but then the vision will come back when she is resting.  Patient states she also has a history of previous CVA with left-sided arm and leg weakness at baseline but states that she has noticed that the weakness on the left side is felt more pronounced over the past 24 hours.  States that the left arm and leg will feel very heavy when she is up and moving around.  Patient denies any headache.  Denies any neck pain or stiffness.  No chest pain or shortness of breath.  History of COPD and is on chronic nasal cannula oxygen.  Denies any abdominal pain.  No nausea or vomiting.  No falls or trauma.  States she feels unsteady on her feet when walking which is new over the past 24 hours.  No fevers or chills.  Normal urinary and bowel habits.  States she takes  aspirin oral 81 mg daily.    Nursing Notes were all reviewed and agreed with, or any disagreements were addressed in the HPI.     REVIEW OF SYSTEMS :    Positives and Pertinent negatives as per HPI.      MEDICAL HISTORY   has a past medical history of Anxiety, Asthma, COPD (chronic obstructive pulmonary disease) (HCC), Depression, Endometriosis, and Knee pain.    Past Surgical History:   Procedure Laterality Date    CAROTID ENDARTERECTOMY Left 2023    LEFT CAROTID ENDARTERECTOMY performed by Jason Myles MD at [facility] OR     SECTION      ENDOMETRIAL ABLATION      HERNIA REPAIR      HYSTERECTOMY (CERVIX STATUS UNKNOWN)      INCONTINENCE SURGERY  2017    KNEE ARTHROSCOPY Right     2012    TONSILLECTOMY        CURRENTMEDICATIONS       Previous Medications    ALBUTEROL SULFATE HFA (VENTOLIN HFA) 108 (90 BASE) MCG/ACT INHALER    Inhale 2 puffs into the lungs 4 times daily as needed for Wheezing or Shortness of Breath    ASPIRIN 81 MG CHEWABLE TABLET    Take 1 tablet by mouth daily    ATORVASTATIN (LIPITOR) 80 MG TABLET    Take 1 tablet by mouth nightly    FLUTICASONE-SALMETEROL (ADVAIR HFA) 230-21 MCG/ACT INHALER    Inhale 2 puffs into the lungs 2 times daily    GUAIFENESIN-CODEINE (GUAIFENESIN AC) 100-10 MG/5ML LIQUID    Take 5 mLs by mouth every 4 hours as needed for Cough or Congestion for up to 14 days. Max Daily Amount: 30 mLs    HANDICAP PLACARD MISC    by Does not apply route 5 years    PREDNISONE (DELTASONE) 10 MG TABLET    Take 4 tablets once a day for 3 days, then take 3 tablets once a day for 3 days, then take 2 tablets once a day for 3 days, then take 1 tablet once a day for 3 days, then stop.    SERTRALINE (ZOLOFT) 100 MG TABLET    Take 1 tablet by mouth daily      SCREENINGS     NIH Stroke Scale  Interval: Reassessment  Level of Consciousness (1a): Alert  LOC Questions (1b): Answers both correctly  LOC Commands (1c): Performs both tasks correctly  Best Gaze (2): Normal  Visual (3): No

## 2024-01-25 NOTE — CONSULTS
NEUROLOGY CONSULT NOTE  Reason for Consult: Carmen Leonard MD asked me to see Bibi Bullock in consultation for evaluation of: CVA    Chief complaint: \"My left eye goes black on me.\"    HISTORY OF PRESENT ILLNESS:  HPI was gathered from the patient at the bedside as well as EMR review.    Bibi Bullock is a 53 y.o. female with a PMH that includes CVA with residual L-sided weakness, L CEA, anxiety, asthma, COPD, depression, and endometriosis.    Patient presented to the ED last night with complaints of left eye blurriness and LUE/LLE heaviness and tingling.  She also reported that she started having black spots in her L eye and her vision slowly started fading.  Her symptoms started about 2200 the evening prior to arrival. She had an associated headache but no chest pain or n/v.   BP in /90.  HR 70.  Afebrile.  NIHSS 4. Code stroke was not called as patient's symptoms had started more than 24 hours prior to presentation.  She states that she has been having these symptoms that come and go since her stroke. She states that she can go for a few weeks without any symptoms and then they return for a few days.  This episode was different because she states it didn't seem to be going away like it usually does.  She denies any recent illness but reports SOB, cough, and states her nose \"constantly runs.\"  She appears mildly short of breath and is wearing O2.    Patient was initially seen at [facility] 9/18/23 with symptoms of L eye vision loss that had been ongoing for a few months.  At that time she was found to have a nearly occluded L ICA.  MRI also showed an acute punctate infarct in the L frontal cortex. She eventually had a L CEA on 9/22/23. She presented again to [facility] about a month later with symptoms of aphasia and L side weakness.  Her L ICA was found to be re-occluded but with robust collateral flow and there was no stroke on MRI. Neurology note at that time noted that although patient reported LUE  weakness she was noted to be able to use her left arm and grab things off of the bedside table.  She also was able to maintain her LUE against gravity then it \"drastically\" dropped to the bed with give away weakness.    Patient does have a personal hx of stroke. She states she does not know if she has a family history.  She is a current every day smoker.  She was on ASA and Lipitor PTA and reports compliance.      MEDICAL HISTORY:  Past Medical History:   Diagnosis Date    Anxiety     Asthma     COPD (chronic obstructive pulmonary disease) (HCC)     Depression     Endometriosis     Knee pain      Past Surgical History:   Procedure Laterality Date    CAROTID ENDARTERECTOMY Left 2023    LEFT CAROTID ENDARTERECTOMY performed by Jason Myles MD at [facility] OR     SECTION      ENDOMETRIAL ABLATION      HERNIA REPAIR      HYSTERECTOMY (CERVIX STATUS UNKNOWN)      INCONTINENCE SURGERY  2017    KNEE ARTHROSCOPY Right     2012    TONSILLECTOMY       Scheduled Meds:   sodium chloride flush  5-40 mL IntraVENous 2 times per day    atorvastatin  80 mg Oral Nightly    clopidogrel  75 mg Oral Daily    enoxaparin  30 mg SubCUTAneous BID     Continuous Infusions:   sodium chloride       PRN Meds:.sodium chloride flush, sodium chloride, ondansetron **OR** ondansetron, polyethylene glycol, labetalol, perflutren lipid microspheres    Medications Prior to Admission: fluticasone-salmeterol (ADVAIR HFA) 230-21 MCG/ACT inhaler, Inhale 2 puffs into the lungs 2 times daily  guaiFENesin-codeine (GUAIFENESIN AC) 100-10 MG/5ML liquid, Take 5 mLs by mouth every 4 hours as needed for Cough or Congestion for up to 14 days. Max Daily Amount: 30 mLs  predniSONE (DELTASONE) 10 MG tablet, Take 4 tablets once a day for 3 days, then take 3 tablets once a day for 3 days, then take 2 tablets once a day for 3 days, then take 1 tablet once a day for 3 days, then stop.  aspirin 81 MG chewable tablet, Take 1 tablet by mouth daily  sertraline

## 2024-01-25 NOTE — PROGRESS NOTES
Pharmacy Medication Reconciliation Note     List of medications patient is currently taking is complete.    Source of information:   1. Rx disp history  2. [facility] [phone]  3. Patient interview    Notes regarding home medications:   1. Patient will need refills on Atorvastatin 80mg to continue on discharge.  2. Called her PCP office about need for refills on Sertraline    Denies taking any other OTC or herbal medications    Wes Ponce RP, [facility] 1/25/2024 9:54 AM

## 2024-01-25 NOTE — PROGRESS NOTES
Pt arrived to floor via stretcher from ED and ambulated to bed. Telemetry activated. Patient oriented to room and use of call light. Call light and personal items within reach. Admission and assessment initiated. Education initiated and reviewed with patient. Denied further needs or questions at this time.

## 2024-01-25 NOTE — TELEPHONE ENCOUNTER
Future Appointments   Date Time Provider Department Center   2/2/2024  8:30 AM SCHEDULE, [facility] [place] - [facility]   2/20/2024  8:30 AM Lacy Restrepo APRN [facility] [place] [facility]   2/22/2024 12:40 PM Jarrell Lawson MD  [facility]     Last appt on 1.19.2024

## 2024-01-25 NOTE — TELEPHONE ENCOUNTER
Wes from [facility] pharmacy called, Bibi at [facility] and is in need of refills for her sertraline (ZOLOFT) 100 MG tablet.    Please send to Kris on [address]    Thank you

## 2024-01-26 VITALS
SYSTOLIC BLOOD PRESSURE: 156 MMHG | BODY MASS INDEX: 36.53 KG/M2 | TEMPERATURE: 98.4 F | RESPIRATION RATE: 18 BRPM | HEART RATE: 88 BPM | OXYGEN SATURATION: 95 % | DIASTOLIC BLOOD PRESSURE: 90 MMHG | WEIGHT: 233.25 LBS

## 2024-01-26 LAB
CHOLEST SERPL-MCNC: 214 MG/DL (ref 0–199)
DEPRECATED RDW RBC AUTO: 14.4 % (ref 12.4–15.4)
EST. AVERAGE GLUCOSE BLD GHB EST-MCNC: 131.2 MG/DL
HBA1C MFR BLD: 6.2 %
HCT VFR BLD AUTO: 44.3 % (ref 36–48)
HDLC SERPL-MCNC: 46 MG/DL (ref 40–60)
HGB BLD-MCNC: 14.7 G/DL (ref 12–16)
LDLC SERPL CALC-MCNC: 133 MG/DL
MCH RBC QN AUTO: 29.2 PG (ref 26–34)
MCHC RBC AUTO-ENTMCNC: 33.1 G/DL (ref 31–36)
MCV RBC AUTO: 88.2 FL (ref 80–100)
PLATELET # BLD AUTO: 314 K/UL (ref 135–450)
PMV BLD AUTO: 7.7 FL (ref 5–10.5)
RBC # BLD AUTO: 5.02 M/UL (ref 4–5.2)
TRIGL SERPL-MCNC: 173 MG/DL (ref 0–150)
VLDLC SERPL CALC-MCNC: 35 MG/DL
WBC # BLD AUTO: 13.8 K/UL (ref 4–11)

## 2024-01-26 PROCEDURE — G0378 HOSPITAL OBSERVATION PER HR: HCPCS

## 2024-01-26 PROCEDURE — 83036 HEMOGLOBIN GLYCOSYLATED A1C: CPT

## 2024-01-26 PROCEDURE — 6360000002 HC RX W HCPCS: Performed by: HOSPITALIST

## 2024-01-26 PROCEDURE — 94640 AIRWAY INHALATION TREATMENT: CPT

## 2024-01-26 PROCEDURE — 96372 THER/PROPH/DIAG INJ SC/IM: CPT

## 2024-01-26 PROCEDURE — 6370000000 HC RX 637 (ALT 250 FOR IP): Performed by: FAMILY MEDICINE

## 2024-01-26 PROCEDURE — 94760 N-INVAS EAR/PLS OXIMETRY 1: CPT

## 2024-01-26 PROCEDURE — 6370000000 HC RX 637 (ALT 250 FOR IP): Performed by: HOSPITALIST

## 2024-01-26 PROCEDURE — 85027 COMPLETE CBC AUTOMATED: CPT

## 2024-01-26 PROCEDURE — 36415 COLL VENOUS BLD VENIPUNCTURE: CPT

## 2024-01-26 PROCEDURE — 80061 LIPID PANEL: CPT

## 2024-01-26 PROCEDURE — 2580000003 HC RX 258: Performed by: HOSPITALIST

## 2024-01-26 RX ORDER — LEVOFLOXACIN 500 MG/1
500 TABLET, FILM COATED ORAL DAILY
Qty: 7 TABLET | Refills: 0 | Status: SHIPPED | OUTPATIENT
Start: 2024-01-26 | End: 2024-02-02

## 2024-01-26 RX ORDER — PREDNISONE 10 MG/1
TABLET ORAL
Qty: 30 TABLET | Refills: 0 | Status: SHIPPED | OUTPATIENT
Start: 2024-01-26

## 2024-01-26 RX ADMIN — IPRATROPIUM BROMIDE AND ALBUTEROL SULFATE 1 DOSE: .5; 2.5 SOLUTION RESPIRATORY (INHALATION) at 16:00

## 2024-01-26 RX ADMIN — PREDNISONE 40 MG: 20 TABLET ORAL at 08:44

## 2024-01-26 RX ADMIN — IPRATROPIUM BROMIDE AND ALBUTEROL SULFATE 1 DOSE: .5; 2.5 SOLUTION RESPIRATORY (INHALATION) at 11:57

## 2024-01-26 RX ADMIN — SERTRALINE 100 MG: 100 TABLET, FILM COATED ORAL at 08:44

## 2024-01-26 RX ADMIN — Medication 10 ML: at 08:45

## 2024-01-26 RX ADMIN — MOMETASONE FUROATE AND FORMOTEROL FUMARATE DIHYDRATE 2 PUFF: 200; 5 AEROSOL RESPIRATORY (INHALATION) at 07:36

## 2024-01-26 RX ADMIN — ENOXAPARIN SODIUM 30 MG: 100 INJECTION SUBCUTANEOUS at 08:45

## 2024-01-26 RX ADMIN — CLOPIDOGREL BISULFATE 75 MG: 75 TABLET ORAL at 08:44

## 2024-01-26 RX ADMIN — IPRATROPIUM BROMIDE AND ALBUTEROL SULFATE 1 DOSE: .5; 2.5 SOLUTION RESPIRATORY (INHALATION) at 07:36

## 2024-01-26 RX ADMIN — ASPIRIN 81 MG 81 MG: 81 TABLET ORAL at 08:44

## 2024-01-26 ASSESSMENT — PAIN SCALES - GENERAL: PAINLEVEL_OUTOF10: 3

## 2024-01-26 ASSESSMENT — PAIN DESCRIPTION - LOCATION: LOCATION: HEAD

## 2024-01-26 NOTE — PLAN OF CARE
Problem: Discharge Planning  Goal: Discharge to home or other facility with appropriate resources  1/26/2024 1749 by Ashanti Ferris, RN  Outcome: Completed     Problem: Pain  Goal: Verbalizes/displays adequate comfort level or baseline comfort level  1/26/2024 1749 by Ashanti Ferris, RN  Outcome: Completed

## 2024-01-26 NOTE — PROGRESS NOTES
Discharge orders acknowledged by RN . Discharge teaching completed with pt and family. AVS reviewed and all questions answered. Medication regimen reviewed and pt understands schedule. MedsToBeds received / E-scripts sent to pt RX. Follow up appointments also reviewed with pt and resources given for discharge. IV removed. Bedside monitor removed from pt. Pt vitals WNL. Pt discharged with all belongings to home with boyfriend. Pt declined w/c transport and was escorted to elevators. No complications.     Electronically signed by Ashanti Ferris RN on 1/26/2024 at 6:03 PM

## 2024-01-26 NOTE — PROGRESS NOTES
Progress Note    Updates  No significant events overnight.    Patient says she feels good.  Wants to go home.      Active Ambulatory Problems     Diagnosis Date Noted    Sensorineural hearing loss, bilateral 02/04/2016    Asthma 11/21/2014    Smoker 11/21/2014    Acute respiratory failure with hypoxia ([facility]) 12/18/2017    Prediabetes 05/24/2018    Depression 05/24/2018    RLS (restless legs syndrome) 05/24/2018    Other hyperlipidemia 05/24/2018    Hypothyroidism 05/30/2018    Arthritis of right knee 05/31/2018    Chronic pain of right knee 05/31/2018    Chronic obstructive pulmonary disease with hypoxia ([facility]) 06/26/2023    Pre-syncope 06/26/2023    Palpitations 06/26/2023    Internal carotid artery stenosis, left 06/27/2023    COPD exacerbation ([facility]) 06/27/2023    Pure hypercholesterolemia 07/10/2023    Anxiety 08/18/2023    TIA (transient ischemic attack) 09/18/2023    Carotid artery stenosis, symptomatic, left 09/18/2023    Left carotid artery occlusion 10/16/2023    Left arm weakness 10/17/2023    Stuttering 10/17/2023    Acute exacerbation of chronic obstructive pulmonary disease (COPD) ([facility]) 01/09/2024    SIRS (systemic inflammatory response syndrome) ([facility]) 01/10/2024    Class 2 severe obesity with serious comorbidity in adult ([facility]) 01/10/2024    Other eosinophilia 01/13/2024     Past Medical History:   Diagnosis Date    COPD (chronic obstructive pulmonary disease) ([facility])     Endometriosis     Knee pain      Current Facility-Administered Medications:     sodium chloride flush 0.9 % injection 5-40 mL, 5-40 mL, IntraVENous, 2 times per day, Pa Plummer MD, 10 mL at 01/26/24 0845    sodium chloride flush 0.9 % injection 5-40 mL, 5-40 mL, IntraVENous, PRN, Pa Plummer MD, 10 mL at 01/25/24 0613    0.9 % sodium chloride infusion, , IntraVENous, PRN, Pa Plummer MD    ondansetron (ZOFRAN-ODT) disintegrating tablet 4 mg, 4 mg, Oral, Q8H PRN **OR** ondansetron (ZOFRAN) injection 4 mg, 4 mg,  arthralgia  Skin- No rash. No easy bruising.  Psychiatric- No depression. No anxiety  Endocrine- No diabetes. No thyroid issues.  Hematologic- No bleeding difficulty. No fatigue  Neurologic-+ weakness. No Headache.    Labs  Glucose 142  Na 140  K 3.8  Cl 102  BUN 19  Cr 0.9  Ca 9.4        ALT 20  AST 21    WBC 13.8K  Hg 14.7  Platelets 314    UA negative    Studies  MRI brain w/o 1/25/24, independently reviewed  1.  No acute intracranial abnormality.  2. Mild chronic white matter microvascular ischemic changes and small focus  of left frontal encephalomalacia in keeping with sequela of prior infarct.  3. Diffuse paranasal sinus disease and bilateral mastoid effusions.    MRI cervical spine w/o 1/25/24, independently reviewed  1. No acute abnormality of the cervical spine within limits of motion artifact.  2. Mild C6-7 spinal canal stenosis.  3. No significant neural foraminal narrowing.    CTA head/neck 1/24/24, independently reviewed  1. No acute arterial abnormality in the head or neck.  2. Stable severe stenosis of the left posterior cerebral artery P1 segment.  3. Stable chronic occlusion of the left internal carotid artery origin with  retrograde reconstitution of the supraclinoid segment supplying the left  ophthalmic artery.    TTE 1/25/24   Suboptimal image quality. Definity contrast administered.   Overall, left ventricular systolic function appears normal. Ejection   fraction is visually estimated to be 60-65%.   No regional wall motion abnormalities are noted.   Normal diastolic function.   Individual aortic valve leaflets are not clearly visualized. Flow   acceleration near level of aortic valve with peak velocity of 2.90 m/s and a   mean pressure gradient of 18 mmHg. Consider cardiac MRI.   No evidence of aortic valve regurgitation.    Impression/Recommendations  LUE/LLE weakness.   Vision changes.   Memory impairment.     The patient tells me that for years she has been having intermittent

## 2024-01-29 ENCOUNTER — TELEPHONE (OUTPATIENT)
Dept: INTERNAL MEDICINE CLINIC | Age: 54
End: 2024-01-29

## 2024-01-29 NOTE — TELEPHONE ENCOUNTER
Care Transitions Initial Follow Up Call    Outreach made within 2 business days of discharge: Yes    Patient: Bibi Bullock Patient : 1970   MRN: [id number]  Reason for Admission: There are no discharge diagnoses documented for the most recent discharge.  Discharge Date: 24       Spoke with: Pt    Discharge department/facility: [facility] Interactive Patient Contact:  Was patient able to fill all prescriptions: Yes  Was patient instructed to bring all medications to the follow-up visit: Yes  Is patient taking all medications as directed in the discharge summary? Yes  Does patient understand their discharge instructions: Yes  Does patient have questions or concerns that need addressed prior to 7-14 day follow up office visit: no    Scheduled appointment with PCP within 7-14 days    Follow Up  Future Appointments   Date Time Provider Department Center   2024  8:30 AM SCHEDULE, [facility] [place] - [facility]   2024  8:30 AM Lacy Restrepo APRN [facility] [place] - [facility]   2024 12:40 PM Jarrell Lawson MD [facility]       Marta Rodriguez MA

## 2024-01-31 NOTE — DISCHARGE SUMMARY
V2.0  Discharge Summary    Name:  Bibi Bullock /Age/Sex: 1970 (53 y.o. female)   Admit Date: 2024  Discharge Date: 24    MRN & CSN:  [id number] & [id number] Encounter Date and Time 24 3:12 PM EST    Attending:  No att. providers found Discharging Provider: Carmen Fleming MD       Hospital Course:     Brief HPI: Bibi Bullock is a 53 y.o. female who presented with facial weakness    Brief Problem Based Course:     Intermittent left eye blindness  History of left-sided neurodeficits from prior stroke  -Brain MRI reassuring.  Seen by neurology who did not think symptoms were consistent with TIA, recommending supportive care     Chronic respiratory failure  -3 L oxygen at baseline     COPD exacerbation  -Start DuoNebs and IV antibiotics  - prednisone burst  -Change Advair to Symbicort      The patient expressed appropriate understanding of, and agreement with the discharge recommendations, medications, and plan.     Consults this admission:  IP CONSULT TO HOSPITALIST  IP CONSULT TO NEUROLOGY    Discharge Diagnosis:   CVA, old, facial weakness        Discharge Instruction:   Follow up appointments: Neurology  Primary care physician: Lacy Restrepo APRN within 2 weeks  Diet: cardiac diet   Activity: activity as tolerated  Disposition: Discharged to:   [x]Home, []HHC, []SNF, []Acute Rehab, []Hospice   Condition on discharge: Stable  Labs and Tests to be Followed up as an outpatient by PCP or Specialist: None pending    Discharge Medications:        Medication List        START taking these medications      levoFLOXacin 500 MG tablet  Commonly known as: LEVAQUIN  Take 1 tablet by mouth daily for 7 days            CONTINUE taking these medications      albuterol sulfate  (90 Base) MCG/ACT inhaler  Commonly known as: Ventolin HFA  Inhale 2 puffs into the lungs 4 times daily as needed for Wheezing or Shortness of Breath     aspirin 81 MG chewable tablet  Take 1 tablet by mouth daily

## 2024-02-20 ENCOUNTER — OFFICE VISIT (OUTPATIENT)
Dept: INTERNAL MEDICINE CLINIC | Age: 54
End: 2024-02-20
Payer: MEDICAID

## 2024-02-20 VITALS
HEART RATE: 76 BPM | WEIGHT: 231 LBS | DIASTOLIC BLOOD PRESSURE: 88 MMHG | BODY MASS INDEX: 36.18 KG/M2 | SYSTOLIC BLOOD PRESSURE: 138 MMHG | OXYGEN SATURATION: 94 % | TEMPERATURE: 98.7 F

## 2024-02-20 DIAGNOSIS — Z72.0 TOBACCO ABUSE: ICD-10-CM

## 2024-02-20 DIAGNOSIS — J44.9 CHRONIC OBSTRUCTIVE PULMONARY DISEASE WITH HYPOXIA (HCC): ICD-10-CM

## 2024-02-20 DIAGNOSIS — R73.03 PREDIABETES: ICD-10-CM

## 2024-02-20 DIAGNOSIS — I65.22 INTERNAL CAROTID ARTERY STENOSIS, LEFT: ICD-10-CM

## 2024-02-20 DIAGNOSIS — Z00.00 PREVENTATIVE HEALTH CARE: ICD-10-CM

## 2024-02-20 DIAGNOSIS — Z12.11 COLON CANCER SCREENING: ICD-10-CM

## 2024-02-20 DIAGNOSIS — F32.A DEPRESSION, UNSPECIFIED DEPRESSION TYPE: ICD-10-CM

## 2024-02-20 DIAGNOSIS — D72.829 LEUKOCYTOSIS, UNSPECIFIED TYPE: ICD-10-CM

## 2024-02-20 DIAGNOSIS — E78.00 PURE HYPERCHOLESTEROLEMIA: Primary | ICD-10-CM

## 2024-02-20 DIAGNOSIS — Z87.891 PERSONAL HISTORY OF TOBACCO USE: ICD-10-CM

## 2024-02-20 DIAGNOSIS — R42 DIZZINESS: ICD-10-CM

## 2024-02-20 LAB
BASOPHILS # BLD: 0.1 K/UL (ref 0–0.2)
BASOPHILS NFR BLD: 1 %
DEPRECATED RDW RBC AUTO: 14.8 % (ref 12.4–15.4)
EOSINOPHIL # BLD: 0.1 K/UL (ref 0–0.6)
EOSINOPHIL NFR BLD: 1.7 %
HCT VFR BLD AUTO: 44.6 % (ref 36–48)
HGB BLD-MCNC: 15 G/DL (ref 12–16)
LYMPHOCYTES # BLD: 3.1 K/UL (ref 1–5.1)
LYMPHOCYTES NFR BLD: 38.9 %
MCH RBC QN AUTO: 30 PG (ref 26–34)
MCHC RBC AUTO-ENTMCNC: 33.7 G/DL (ref 31–36)
MCV RBC AUTO: 88.9 FL (ref 80–100)
MONOCYTES # BLD: 0.6 K/UL (ref 0–1.3)
MONOCYTES NFR BLD: 7.7 %
NEUTROPHILS # BLD: 4 K/UL (ref 1.7–7.7)
NEUTROPHILS NFR BLD: 50.7 %
PLATELET # BLD AUTO: 298 K/UL (ref 135–450)
PMV BLD AUTO: 8.8 FL (ref 5–10.5)
RBC # BLD AUTO: 5.02 M/UL (ref 4–5.2)
WBC # BLD AUTO: 7.9 K/UL (ref 4–11)

## 2024-02-20 PROCEDURE — 99214 OFFICE O/P EST MOD 30 MIN: CPT | Performed by: NURSE PRACTITIONER

## 2024-02-20 PROCEDURE — G0296 VISIT TO DETERM LDCT ELIG: HCPCS | Performed by: NURSE PRACTITIONER

## 2024-02-20 RX ORDER — ROSUVASTATIN CALCIUM 40 MG/1
40 TABLET, COATED ORAL DAILY
Qty: 90 TABLET | Refills: 1 | Status: SHIPPED | OUTPATIENT
Start: 2024-02-20

## 2024-02-20 ASSESSMENT — ENCOUNTER SYMPTOMS
SHORTNESS OF BREATH: 1
CONSTIPATION: 0
WHEEZING: 0

## 2024-02-20 NOTE — PROGRESS NOTES
pack-year smoking history. She has never used smokeless tobacco.. Discussed with patient the risks and benefits of screening, including over-diagnosis, false positive rate, and total radiation exposure.  The patient currently exhibits no signs or symptoms suggestive of lung cancer.  Discussed with patient the importance of compliance with yearly annual lung cancer screenings and willingness to undergo diagnosis and treatment if screening scan is positive.  In addition, the patient was counseled regarding the importance of remaining smoke free and/or total smoking cessation.    Also reviewed the following if the patient has Medicare that as of February 10, 2022, Medicare only covers LDCT screening in patients aged 50-77 with at least a 20 pack-year smoking history who currently smoke or have quit in the last 15 years

## 2024-02-20 NOTE — PATIENT INSTRUCTIONS
Holter (heart monitor)  Cologuard stool testing for colon cancer screening  Mammogram   CT screening for lung cancer  See gynecologist   Stop atorvastatin, start rosuvastatin (cholesterol medication)     Learning About Lung Cancer Screening  What is screening for lung cancer?     Lung cancer screening is a way to find some lung cancers early, before a person has any symptoms of the cancer.  Lung cancer screening may help those who have the highest risk for lung cancer--people age 50 and older who are or were heavy smokers. For most people, who aren't at increased risk, screening for lung cancer probably isn't helpful.  Screening won't prevent cancer. And it may not find all lung cancers. Lung cancer screening may lower the risk of dying from lung cancer in a small number of people.  How is it done?  Lung cancer screening is done with a low-dose CT (computed tomography) scan. A CT scan uses X-rays, or radiation, to make detailed pictures of your body. Experts recommend that screening be done in medical centers that focus on finding and treating lung cancer.  Who is screening recommended for?  Lung cancer screening is recommended for people age 50 and older who are or were heavy smokers. That means people with a smoking history of at least 20 pack years. A pack year is a way to measure how heavy a smoker you are or were.  To figure out your pack years, multiply how many packs a day on average (assuming 20 cigarettes per pack) you have smoked by how many years you have smoked. For example:  If you smoked 1 pack a day for 20 years, that's 1 times 20. So you have a smoking history of 20 pack years.  If you smoked 2 packs a day for 10 years, that's 2 times 10. So you have a smoking history of 20 pack years.  Experts agree that screening is for people who have a high risk of lung cancer. But experts don't agree on what high risk means. Some say people age 50 or older with at least a 20-pack-year smoking history are high

## 2024-02-21 LAB
EST. AVERAGE GLUCOSE BLD GHB EST-MCNC: 128.4 MG/DL
HBA1C MFR BLD: 6.1 %

## 2024-02-23 ENCOUNTER — OFFICE VISIT (OUTPATIENT)
Dept: PULMONOLOGY | Age: 54
End: 2024-02-23
Payer: MEDICAID

## 2024-02-23 VITALS
DIASTOLIC BLOOD PRESSURE: 80 MMHG | HEIGHT: 67 IN | SYSTOLIC BLOOD PRESSURE: 128 MMHG | HEART RATE: 81 BPM | TEMPERATURE: 97.2 F | RESPIRATION RATE: 18 BRPM | BODY MASS INDEX: 37.04 KG/M2 | WEIGHT: 236 LBS | OXYGEN SATURATION: 95 %

## 2024-02-23 DIAGNOSIS — J44.89 ASTHMA-COPD OVERLAP SYNDROME: Primary | ICD-10-CM

## 2024-02-23 DIAGNOSIS — J96.01 ACUTE RESPIRATORY FAILURE WITH HYPOXIA (HCC): ICD-10-CM

## 2024-02-23 PROBLEM — J44.1 ACUTE EXACERBATION OF CHRONIC OBSTRUCTIVE PULMONARY DISEASE (COPD) (HCC): Status: RESOLVED | Noted: 2024-01-09 | Resolved: 2024-02-23

## 2024-02-23 PROBLEM — J44.1 COPD EXACERBATION (HCC): Status: RESOLVED | Noted: 2023-06-27 | Resolved: 2024-02-23

## 2024-02-23 PROCEDURE — 99214 OFFICE O/P EST MOD 30 MIN: CPT | Performed by: INTERNAL MEDICINE

## 2024-02-23 RX ORDER — FLUTICASONE FUROATE, UMECLIDINIUM BROMIDE AND VILANTEROL TRIFENATATE 200; 62.5; 25 UG/1; UG/1; UG/1
1 POWDER RESPIRATORY (INHALATION) DAILY
Qty: 1 EACH | Refills: 11 | Status: SHIPPED | OUTPATIENT
Start: 2024-02-23

## 2024-02-23 RX ORDER — FLUTICASONE FUROATE, UMECLIDINIUM BROMIDE AND VILANTEROL TRIFENATATE 200; 62.5; 25 UG/1; UG/1; UG/1
1 POWDER RESPIRATORY (INHALATION) DAILY
Qty: 1 EACH | Refills: 0 | Status: SHIPPED | COMMUNITY
Start: 2024-02-23

## 2024-02-23 ASSESSMENT — COPD QUESTIONNAIRES: COPD: 1

## 2024-02-23 ASSESSMENT — ENCOUNTER SYMPTOMS
CHEST TIGHTNESS: 0
WHEEZING: 0
SHORTNESS OF BREATH: 0
COUGH: 1

## 2024-02-23 NOTE — ASSESSMENT & PLAN NOTE
-Trelegy, albuterol as needed  -Respiratory allergy profile with negative specific antigens but mildly elevated total IgE  -Eosinophils of 400  -May benefit from anti-IL-5 therapy but want to wait for now given her ongoing neurologic workup

## 2024-02-23 NOTE — PROGRESS NOTES
[facility] PULMONARY, SLEEP, AND CRITICAL CARE    Bibi Bullock (:  1970) is a 53 y.o. female,New patient, here for evaluation of the following chief complaint(s):  Asthma and COPD         ASSESSMENT/PLAN:  1. Asthma-COPD overlap syndrome  Assessment & Plan:  -Trelegy, albuterol as needed  -Respiratory allergy profile with negative specific antigens but mildly elevated total IgE  -Eosinophils of 400  -May benefit from anti-IL-5 therapy but want to wait for now given her ongoing neurologic workup  Orders:  -     fluticasone-umeclidin-vilant (TRELEGY ELLIPTA) 200-62.5-25 MCG/ACT AEPB inhaler; Inhale 1 puff into the lungs daily, Disp-1 each, R-11Normal  2. Acute respiratory failure with hypoxia (HCC)  Assessment & Plan:  -As needed intermittently for COPD exacerbations.  -Will have her continue to monitor her symptoms and SpO2.  She may be able to wean off oxygen in the future.  May benefit from 6-minute walk test will discuss at next visit.      Return in about 6 months (around 2024).  Future Appointments   Date Time Provider Department Center   2024  9:40 AM Juan Frank MD [facility] GYN [facility]   3/7/2024 10:00 AM [facility] 2 [facility]   3/7/2024 10:30 AM Easton HOLTER MONITOR  WSTZ EKG \[facility]\""            Subjective   SUBJECTIVE/OBJECTIVE:  Current everyday 40-pack-year smoker here for follow-up asthma COPD overlap    - PFTs done in 2018 that showed fixed airflow obstruction +bdr    -Recent admission.  Doing well since discharge no further wheezing was discharged on oxygen.  -Respiratory allergy profile negative, mildly elevated IgE.  Eosinophils of 400 on CBC.    Asthma  She complains of cough. There is no shortness of breath or wheezing. Her past medical history is significant for asthma.   COPD  She complains of cough. There is no shortness of breath or wheezing. Her past medical history is significant for asthma.       Review of Systems   Constitutional: Negative.

## 2024-02-23 NOTE — ASSESSMENT & PLAN NOTE
-As needed intermittently for COPD exacerbations.  -Will have her continue to monitor her symptoms and SpO2.  She may be able to wean off oxygen in the future.  May benefit from 6-minute walk test will discuss at next visit.

## 2024-02-28 ENCOUNTER — OFFICE VISIT (OUTPATIENT)
Dept: GYNECOLOGY | Age: 54
End: 2024-02-28
Payer: MEDICAID

## 2024-02-28 VITALS — SYSTOLIC BLOOD PRESSURE: 130 MMHG | DIASTOLIC BLOOD PRESSURE: 82 MMHG | WEIGHT: 235 LBS | BODY MASS INDEX: 36.8 KG/M2

## 2024-02-28 DIAGNOSIS — Z01.419 WELL WOMAN EXAM WITH ROUTINE GYNECOLOGICAL EXAM: Primary | ICD-10-CM

## 2024-02-28 DIAGNOSIS — Z12.31 ENCOUNTER FOR SCREENING MAMMOGRAM FOR MALIGNANT NEOPLASM OF BREAST: ICD-10-CM

## 2024-02-28 PROCEDURE — 99386 PREV VISIT NEW AGE 40-64: CPT | Performed by: OBSTETRICS & GYNECOLOGY

## 2024-02-28 NOTE — PROGRESS NOTES
Subjective:      Patient ID: Bibi Bullock is a 53 y.o. female.    HPI  pts here for annual gyn exam.  She denies complaints.  No bleeding.  H/o hysterectomy 7 years ago.  Mammogram and cologard ordered.    Review of Systems Pertinent review of systems items discussed above.  All others systems items not discussed above were negative.      Objective:   Physical Exam  Constitutional:       Appearance: She is well-developed.   HENT:      Head: Normocephalic and atraumatic.   Neck:      Thyroid: No thyromegaly.      Trachea: No tracheal deviation.   Cardiovascular:      Rate and Rhythm: Normal rate and regular rhythm.      Heart sounds: Normal heart sounds. No murmur heard.  Pulmonary:      Effort: Pulmonary effort is normal. No respiratory distress.      Breath sounds: Normal breath sounds. No wheezing or rales.   Chest:   Breasts:     Right: No mass, nipple discharge or skin change.      Left: No mass, nipple discharge or skin change.   Abdominal:      General: There is no distension.      Palpations: Abdomen is soft. There is no mass.      Tenderness: There is no abdominal tenderness. There is no rebound.   Genitourinary:     Labia:         Right: No lesion.         Left: No lesion.       Vagina: Normal. No foreign body. No vaginal discharge.      Adnexa:         Right: No mass or tenderness.          Left: No mass or tenderness.        Rectum: Normal. Guaiac result negative. No mass or external hemorrhoid.      Comments: Pap performed.   Uterus and cx absent.  Musculoskeletal:         General: Normal range of motion.   Lymphadenopathy:      Cervical: No cervical adenopathy.   Neurological:      Mental Status: She is alert and oriented to person, place, and time.         Assessment:   Normal gyn exam     Plan:   F/u annual gyn exam.  Call with results.       Juan Frank MD

## 2024-03-07 ENCOUNTER — HOSPITAL ENCOUNTER (OUTPATIENT)
Dept: CT IMAGING | Age: 54
Discharge: HOME | End: 2024-03-07
Payer: MEDICAID

## 2024-03-07 ENCOUNTER — HOSPITAL ENCOUNTER (OUTPATIENT)
Dept: NON INVASIVE DIAGNOSTICS | Age: 54
Discharge: HOME | End: 2024-03-07
Payer: MEDICAID

## 2024-03-07 DIAGNOSIS — R42 DIZZINESS: ICD-10-CM

## 2024-03-07 DIAGNOSIS — Z87.891 PERSONAL HISTORY OF TOBACCO USE: ICD-10-CM

## 2024-03-07 PROCEDURE — 71271 CT THORAX LUNG CANCER SCR C-: CPT

## 2024-03-07 PROCEDURE — 93226 XTRNL ECG REC<48 HR SCAN A/R: CPT

## 2024-03-07 PROCEDURE — 93225 XTRNL ECG REC<48 HRS REC: CPT

## 2024-03-14 LAB
ACQUISITION DURATION: NORMAL S
AVERAGE HEART RATE: 80 BPM
EKG DIAGNOSIS: NORMAL
HOLTER MAX HEART RATE: 118 BPM
HOOKUP DATE: NORMAL
HOOKUP TIME: NORMAL
MAX HEART RATE TIME/DATE: NORMAL
MIN HEART RATE TIME/DATE: NORMAL
MIN HEART RATE: 67 BPM
NUMBER OF QRS COMPLEXES: NORMAL
NUMBER OF SUPRAVENTRICULAR COUPLETS: 0
NUMBER OF SUPRAVENTRICULAR ECTOPICS: 22
NUMBER OF SUPRAVENTRICULAR ISOLATED BEATS: 22
NUMBER OF VENTRICULAR BIGEMINAL CYCLES: 0
NUMBER OF VENTRICULAR COUPLETS: 0
NUMBER OF VENTRICULAR ECTOPICS: 2

## 2024-07-30 ENCOUNTER — COMMUNITY OUTREACH (OUTPATIENT)
Dept: INTERNAL MEDICINE CLINIC | Age: 54
End: 2024-07-30

## 2024-07-30 NOTE — PROGRESS NOTES
Patient's HM shows they are overdue for Colorectal Screening and mammogram.   Care Everywhere and  files searched.  No results to attach to order nor HM updated.

## 2024-08-22 NOTE — PLAN OF CARE
Negative BOLIVAR    Negative dsDNA Problem: Discharge Planning  Goal: Discharge to home or other facility with appropriate resources  Outcome: Progressing  Flowsheets (Taken 9/19/2023 1751)  Discharge to home or other facility with appropriate resources:   Identify barriers to discharge with patient and caregiver   Identify discharge learning needs (meds, wound care, etc)   Arrange for needed discharge resources and transportation as appropriate     Problem: Safety - Adult  Goal: Free from fall injury  Outcome: Progressing  Flowsheets (Taken 9/19/2023 1751)  Free From Fall Injury: Instruct family/caregiver on patient safety  Note: Pt will remain free from accidental injury this shift. Pt has fall risk measures (fall risk bracelet, fall risk sign, fall risk cloth, non-slip socks, dome light on) in place. Pt bed is in low position, bed alarm on, bed wheels locked, call light within reach, bedside table within reach, chair wheels locked, chair alarm on. Problem: ABCDS Injury Assessment  Goal: Absence of physical injury  Outcome: Progressing  Flowsheets (Taken 9/19/2023 1751)  Absence of Physical Injury: Implement safety measures based on patient assessment     Problem: Neurosensory - Adult  Goal: Achieves stable or improved neurological status  Outcome: Progressing  Flowsheets (Taken 9/19/2023 1751)  Achieves stable or improved neurological status:   Assess for and report changes in neurological status   Monitor temperature, glucose, and sodium. Initiate appropriate interventions as ordered   Maintain blood pressure and fluid volume within ordered parameters to optimize cerebral perfusion and minimize risk of hemorrhage   Initiate measures to prevent increased intracranial pressure  Note: Neuro checks are being performed every four hours.      Problem: Pain  Goal: Verbalizes/displays adequate comfort level or baseline comfort level  Outcome: Progressing  Flowsheets (Taken 9/19/2023 1751)  Verbalizes/displays adequate comfort level or baseline comfort level:   Encourage patient to monitor pain and request assistance   Administer analgesics based on type and severity of pain and evaluate response   Consider cultural and social influences on pain and pain management   Assess pain using appropriate pain scale   Implement non-pharmacological measures as appropriate and evaluate response   Notify Licensed Independent Practitioner if interventions unsuccessful or patient reports new pain  Note: Pt has denied pain during shift, will continue to monitor pt's pain level. Problem: Respiratory - Adult  Goal: Achieves optimal ventilation and oxygenation  Outcome: Progressing  Flowsheets (Taken 9/19/2023 1751)  Achieves optimal ventilation and oxygenation:   Assess for changes in respiratory status   Position to facilitate oxygenation and minimize respiratory effort   Assess the need for suctioning and aspirate as needed   Respiratory therapy support as indicated   Initiate smoking cessation protocol as indicated   Assess for changes in mentation and behavior   Oxygen supplementation based on oxygen saturation or arterial blood gases   Encourage broncho-pulmonary hygiene including cough, deep breathe, incentive spirometry   Assess and instruct to report shortness of breath or any respiratory difficulty  Note: Pt remains on RA and SpO2 has been WNL. Pt does have dyspnea on exertion. Problem: Cardiovascular - Adult  Goal: Maintains optimal cardiac output and hemodynamic stability  Outcome: Progressing  Flowsheets (Taken 9/19/2023 1751)  Maintains optimal cardiac output and hemodynamic stability:   Monitor blood pressure and heart rate   Monitor urine output and notify Licensed Independent Practitioner for values outside of normal range   Assess for signs of decreased cardiac output  Note: Pt is on continuous telemetry and heart rhythm is NSR. Pt is on a heparin gtt at 15 units/kg/hr.

## 2024-09-16 ENCOUNTER — TELEPHONE (OUTPATIENT)
Dept: INTERNAL MEDICINE CLINIC | Age: 54
End: 2024-09-16

## 2024-12-04 ENCOUNTER — OFFICE VISIT (OUTPATIENT)
Dept: INTERNAL MEDICINE CLINIC | Age: 54
End: 2024-12-04

## 2024-12-04 VITALS
BODY MASS INDEX: 36.1 KG/M2 | DIASTOLIC BLOOD PRESSURE: 98 MMHG | OXYGEN SATURATION: 93 % | HEART RATE: 77 BPM | WEIGHT: 230 LBS | SYSTOLIC BLOOD PRESSURE: 162 MMHG | HEIGHT: 67 IN

## 2024-12-04 DIAGNOSIS — E78.00 PURE HYPERCHOLESTEROLEMIA: ICD-10-CM

## 2024-12-04 DIAGNOSIS — J44.9 CHRONIC OBSTRUCTIVE PULMONARY DISEASE, UNSPECIFIED COPD TYPE (HCC): ICD-10-CM

## 2024-12-04 DIAGNOSIS — R73.03 PREDIABETES: ICD-10-CM

## 2024-12-04 DIAGNOSIS — K21.9 GERD WITH APNEA WITHOUT ESOPHAGITIS: ICD-10-CM

## 2024-12-04 DIAGNOSIS — F32.A DEPRESSION, UNSPECIFIED DEPRESSION TYPE: ICD-10-CM

## 2024-12-04 DIAGNOSIS — Z00.00 ENCOUNTER FOR PREVENTIVE CARE: Primary | ICD-10-CM

## 2024-12-04 DIAGNOSIS — F41.9 ANXIETY: ICD-10-CM

## 2024-12-04 DIAGNOSIS — R06.81 GERD WITH APNEA WITHOUT ESOPHAGITIS: ICD-10-CM

## 2024-12-04 RX ORDER — FLUTICASONE PROPIONATE AND SALMETEROL XINAFOATE 115; 21 UG/1; UG/1
2 AEROSOL, METERED RESPIRATORY (INHALATION)
COMMUNITY
End: 2024-12-04 | Stop reason: ALTCHOICE

## 2024-12-04 RX ORDER — FLUTICASONE PROPIONATE AND SALMETEROL XINAFOATE 230; 21 UG/1; UG/1
2 AEROSOL, METERED RESPIRATORY (INHALATION) 2 TIMES DAILY
Qty: 3 EACH | Refills: 2 | Status: SHIPPED | OUTPATIENT
Start: 2024-12-04

## 2024-12-04 RX ORDER — PREDNISONE 20 MG/1
TABLET ORAL
Qty: 10 TABLET | Refills: 0 | Status: SHIPPED | OUTPATIENT
Start: 2024-12-04 | End: 2024-12-16

## 2024-12-04 RX ORDER — ROSUVASTATIN CALCIUM 40 MG/1
40 TABLET, COATED ORAL DAILY
Qty: 90 TABLET | Refills: 1 | Status: SHIPPED | OUTPATIENT
Start: 2024-12-04

## 2024-12-04 RX ORDER — SERTRALINE HYDROCHLORIDE 100 MG/1
100 TABLET, FILM COATED ORAL DAILY
Qty: 90 TABLET | Refills: 1 | Status: SHIPPED | OUTPATIENT
Start: 2024-12-04

## 2024-12-04 RX ORDER — ALBUTEROL SULFATE 90 UG/1
2 INHALANT RESPIRATORY (INHALATION) 4 TIMES DAILY PRN
Qty: 54 G | Refills: 2 | Status: SHIPPED | OUTPATIENT
Start: 2024-12-04

## 2024-12-04 SDOH — ECONOMIC STABILITY: INCOME INSECURITY: HOW HARD IS IT FOR YOU TO PAY FOR THE VERY BASICS LIKE FOOD, HOUSING, MEDICAL CARE, AND HEATING?: PATIENT DECLINED

## 2024-12-04 SDOH — ECONOMIC STABILITY: FOOD INSECURITY: WITHIN THE PAST 12 MONTHS, THE FOOD YOU BOUGHT JUST DIDN'T LAST AND YOU DIDN'T HAVE MONEY TO GET MORE.: PATIENT DECLINED

## 2024-12-04 SDOH — ECONOMIC STABILITY: FOOD INSECURITY: WITHIN THE PAST 12 MONTHS, YOU WORRIED THAT YOUR FOOD WOULD RUN OUT BEFORE YOU GOT MONEY TO BUY MORE.: PATIENT DECLINED

## 2024-12-04 ASSESSMENT — ENCOUNTER SYMPTOMS
DIARRHEA: 0
NAUSEA: 0
SINUS PRESSURE: 0
ABDOMINAL PAIN: 0
COLOR CHANGE: 0
COUGH: 0
CHOKING: 0
TROUBLE SWALLOWING: 1
CONSTIPATION: 0
VOICE CHANGE: 0
WHEEZING: 1
VOMITING: 0
SHORTNESS OF BREATH: 1
PHOTOPHOBIA: 0

## 2024-12-04 NOTE — ASSESSMENT & PLAN NOTE
Chronic, not at goal (unstable), continue current plan pending work up below  - A1c ordered.  - Discussed decreased sugar and carb intake. Discussed starting metformin due to decrease risk of cardiovascular disease based on A1C results.

## 2024-12-04 NOTE — ASSESSMENT & PLAN NOTE
Chronic, at goal (stable), continue current plan pending work up below  - Controlled, continue lipitor.   Orders:    Lipid, Fasting; Future    rosuvastatin (CRESTOR) 40 MG tablet; Take 1 tablet by mouth daily    Lipid, Fasting

## 2024-12-04 NOTE — ASSESSMENT & PLAN NOTE
Chronic, at goal (stable), continue current treatment plan  - Well controlled, continue Zoloft.  Orders:    sertraline (ZOLOFT) 100 MG tablet; Take 1 tablet by mouth daily

## 2024-12-04 NOTE — PROGRESS NOTES
Bibi Bullock (:  1970) is a 54 y.o. female,Established patient, here for evaluation of the following chief complaint(s): Cough     Pt is a 54 year old female with past medical history of depression, prediabetes, tobacco abuse, CVA/ TIA, carotid artery stenosis, COPD, and arthritis.     She reports recent increased frequency of coughing over the last few weeks. Does have intermittent headaches with the cough. Reports a \"blacking\" out feeling when she has coughing fits. Does report the coughing fits are hard. Exercise does not exacerbate her symptoms. Denies chest pain, fevers, chills, rhinorrhea, or known ill exposures.    Additionally has concerns about frequent feeling of food being stuck when she eats. Denies any choking but, does feel full quickly. Has taken PPI therapy in the past but, has not been on her medication for a while now.     She has been stable on lipitor for history of CVA with left ICA stenosis. Chronic dizziness. Denies any syncope.     COPD is not well managed. She is using Advair, Trelegy and albuterol PRN. Monitors SPO2 at home. Supposed to chronically be on oxygen but, has not had it delivered as she is not living at the current address.     Still smokes but, has been weaning off. Has previously had a goal to quit but still attempting.       Assessment & Plan  Chronic obstructive pulmonary disease, unspecified COPD type (HCC)   Chronic, worsening (exacerbation), changes made today: Steroid taper ordered for COPD exacerbation. Will treat conservatively as patient has been off of her oxygen, has been afebrile and no changes in sputum.  - Encouraged patient to follow up with pulmonology as discussed every 6 months.     Orders:    albuterol sulfate HFA (VENTOLIN HFA) 108 (90 Base) MCG/ACT inhaler; Inhale 2 puffs into the lungs 4 times daily as needed for Wheezing or Shortness of Breath    DME Order for Pulse Ox as OP    fluticasone-salmeterol (ADVAIR HFA) 230-21 MCG/ACT inhaler;

## 2024-12-05 ENCOUNTER — TELEPHONE (OUTPATIENT)
Dept: PULMONOLOGY | Age: 54
End: 2024-12-05

## 2024-12-05 ENCOUNTER — CARE COORDINATION (OUTPATIENT)
Dept: CARE COORDINATION | Age: 54
End: 2024-12-05

## 2024-12-05 DIAGNOSIS — I65.23 BILATERAL CAROTID ARTERY STENOSIS: ICD-10-CM

## 2024-12-05 DIAGNOSIS — J44.89 ASTHMA-COPD OVERLAP SYNDROME (HCC): Primary | ICD-10-CM

## 2024-12-05 LAB
ALBUMIN SERPL-MCNC: 4.4 G/DL (ref 3.4–5)
ALBUMIN/GLOB SERPL: 1.6 {RATIO} (ref 1.1–2.2)
ALP SERPL-CCNC: 96 U/L (ref 40–129)
ALT SERPL-CCNC: 17 U/L (ref 10–40)
ANION GAP SERPL CALCULATED.3IONS-SCNC: 12 MMOL/L (ref 3–16)
AST SERPL-CCNC: 19 U/L (ref 15–37)
BASOPHILS # BLD: 0.1 K/UL (ref 0–0.2)
BASOPHILS NFR BLD: 1.2 %
BILIRUB SERPL-MCNC: <0.2 MG/DL (ref 0–1)
BUN SERPL-MCNC: 10 MG/DL (ref 7–20)
CALCIUM SERPL-MCNC: 10.1 MG/DL (ref 8.3–10.6)
CHLORIDE SERPL-SCNC: 100 MMOL/L (ref 99–110)
CHOLEST SERPL-MCNC: 205 MG/DL (ref 0–199)
CO2 SERPL-SCNC: 27 MMOL/L (ref 21–32)
CREAT SERPL-MCNC: 0.6 MG/DL (ref 0.6–1.1)
DEPRECATED RDW RBC AUTO: 13.6 % (ref 12.4–15.4)
EOSINOPHIL # BLD: 0.2 K/UL (ref 0–0.6)
EOSINOPHIL NFR BLD: 1.8 %
EST. AVERAGE GLUCOSE BLD GHB EST-MCNC: 119.8 MG/DL
GFR SERPLBLD CREATININE-BSD FMLA CKD-EPI: >90 ML/MIN/{1.73_M2}
GLUCOSE SERPL-MCNC: 79 MG/DL (ref 70–99)
HBA1C MFR BLD: 5.8 %
HCT VFR BLD AUTO: 43.8 % (ref 36–48)
HCV AB SERPL QL IA: NORMAL
HDLC SERPL-MCNC: 36 MG/DL (ref 40–60)
HGB BLD-MCNC: 14.7 G/DL (ref 12–16)
HIV 1+2 AB+HIV1 P24 AG SERPL QL IA: NORMAL
HIV 2 AB SERPL QL IA: NORMAL
HIV1 AB SERPL QL IA: NORMAL
HIV1 P24 AG SERPL QL IA: NORMAL
LDL CHOLESTEROL: 136 MG/DL
LYMPHOCYTES # BLD: 3.2 K/UL (ref 1–5.1)
LYMPHOCYTES NFR BLD: 31.9 %
MCH RBC QN AUTO: 30.4 PG (ref 26–34)
MCHC RBC AUTO-ENTMCNC: 33.6 G/DL (ref 31–36)
MCV RBC AUTO: 90.6 FL (ref 80–100)
MONOCYTES # BLD: 0.5 K/UL (ref 0–1.3)
MONOCYTES NFR BLD: 5.4 %
NEUTROPHILS # BLD: 6 K/UL (ref 1.7–7.7)
NEUTROPHILS NFR BLD: 59.7 %
PLATELET # BLD AUTO: 371 K/UL (ref 135–450)
PMV BLD AUTO: 8.7 FL (ref 5–10.5)
POTASSIUM SERPL-SCNC: 4.4 MMOL/L (ref 3.5–5.1)
PROT SERPL-MCNC: 7.2 G/DL (ref 6.4–8.2)
RBC # BLD AUTO: 4.84 M/UL (ref 4–5.2)
SODIUM SERPL-SCNC: 139 MMOL/L (ref 136–145)
TRIGL SERPL-MCNC: 167 MG/DL (ref 0–150)
TSH SERPL DL<=0.005 MIU/L-ACNC: 3.86 UIU/ML (ref 0.27–4.2)
VLDLC SERPL CALC-MCNC: 33 MG/DL
WBC # BLD AUTO: 10 K/UL (ref 4–11)

## 2024-12-05 RX ORDER — ASPIRIN 81 MG/1
81 TABLET, CHEWABLE ORAL DAILY
Qty: 90 TABLET | Refills: 3 | Status: SHIPPED | OUTPATIENT
Start: 2024-12-05

## 2024-12-05 NOTE — TELEPHONE ENCOUNTER
Pt missed 11/22 appt and is now wanting another f.u to determine if she needs a 6 minute walk test or if she still needs to be on oxygen.     Next available is in March and she wants to be seen before then or at least get an order for the walk test put in

## 2024-12-05 NOTE — CARE COORDINATION
Ambulatory Care Coordination Note     2024 12:03 PM     Patient Current Location:  Home: [address]  [place] [address]     This patient was received as a referral from Provider.    ACM contacted the patient by telephone. Verified name and  with patient as identifiers. Provided introduction to self, and explanation of the ACM role.   Patient accepted care management services at this time.          ACM: Linda Nichols RN     Challenges to be reviewed by the provider   Additional needs identified to be addressed with provider No               Method of communication with provider: chart routing.    Utilization: N/A - Initial Call     Care Summary Note: provider referral   Help w oxygen  Call to patient  Introduced role  Pt has e tanks to use when out of house  Has concentrator at home   Pt stays with son, daughter or brother depending on their schedules  She takes concentrator w her to each house, does not have a home of her own since stroke, stays w family , rotates homes    Uses only trelegy or Advair  Been getting samples of trelegy or advair depending on what office has  Trelegy required PA, and was denied  Has not tried to fill advair    Tan out of refills on ASA 81 mg , and started to buy OTC adult ASA, she is uncertain of dose  Needs tubing for concentrator as she does not have enough to ove out of one room  Picked up 3 of her meds, was told needed to call dr office re prednisone   Per review of prescription, quantity entered wrong, needs 12 tabs, not 10.    Does not have pulse ox, order written yesterday but pharmacy could not fill.   Missed appt w DR Lawson  Needs to reschedule because his last OV note talks about possibly redoing 6 min walk to see if still needs oxygen?, but pt has not had a pulse ox to check at home.        Order for pulse ox , OV note, ins card/ ID sent to [facility] at [phone]  Call to [facility] to request tubing, extra e tanks or smaller tanks and receipt of order for

## 2024-12-12 ENCOUNTER — CARE COORDINATION (OUTPATIENT)
Dept: CARE COORDINATION | Age: 54
End: 2024-12-12

## 2024-12-12 NOTE — CARE COORDINATION
Ambulatory Care Coordination Note     2024 2:33 PM     Patient Current Location:  Home: [address]  [facility] [address]     ACM contacted the patient by telephone. Verified name and  with patient as identifiers.         ACM: Linda Nichols RN     Challenges to be reviewed by the provider   Additional needs identified to be addressed with provider No  none               Method of communication with provider: chart routing.    Utilization: Patient has not had any utilization since our last call.     Care Summary Note: Call to patient, scheduled for 6 min walk next week!  Did not make it to [employer] for additional tanks  Aware can have them delivered  Extra e tanks ? Or smaller for weight/ ease to manage , but wont last as long     If oxygen still needed based on 6 min walk then will need to order port concentrator  Needs additional tubing too..aware can  at office or order    Never heard from  , provided address and number to call to schedule    Offered patient enrollment in the Remote Patient Monitoring (RPM) program for in-home monitoring: Yes, but did not enroll at this time: already monitoring with home equipment.     Assessments Completed:   No changes since last call    Medications Reviewed:   Patient denies any changes with medications and reports taking all medications as prescribed.    Advance Care Planning:   Reviewed and current     Care Planning:   Not completed during this call    PCP/Specialist follow up:   Future Appointments         Provider Specialty Dept Phone    2024 11:30 AM Conchita Jenkins, BARRY Cardiac Rehabilitation [phone]    3/4/2025 11:30 AM Lacy Restrepo APRN Internal Medicine [phone]            Follow Up:   Plan for next [facility] outreach in approximately 2 weeks to complete:  - disease specific assessments  - medication review   - follow up appointment with 6 mn walk .   Patient  is agreeable to this plan.

## 2024-12-19 ENCOUNTER — HOSPITAL ENCOUNTER (OUTPATIENT)
Dept: CARDIAC REHAB | Age: 54
Setting detail: THERAPIES SERIES
Discharge: HOME | End: 2024-12-19
Payer: MEDICAID

## 2024-12-19 DIAGNOSIS — J44.89 ASTHMA-COPD OVERLAP SYNDROME (HCC): ICD-10-CM

## 2024-12-19 PROCEDURE — 94618 PULMONARY STRESS TESTING: CPT

## 2024-12-19 NOTE — PROCEDURES
PROCEDURE NOTE  [facility] Cardiopulmonary Rehabilitation   Six Minute Walk Test    Bibi Bullock YOB: 1970  Account Number: [id number]  AGE: 54 y.o.     OP test [x]   Pulmonary Rehab PRE []  POST   []    Diagnosis: Asthma-COPD overlap syndrome      Referring Physician: Dr. Lawson    Gender []  male [x] female AGE:54     Height:5 ft 6 in 167 cm    Weight:237 lbs  107 kg  Blood Pressure 110/70    Medications affecting heart rate:  Albuterol sulfate  (90 Base) MCG/ACT Inhaler 2 puffs 4 times daily as needed, Advair -21 MCG/ACT Inhaler 2 puffs 2 times daily, Trelegy Ellipta 200-62.5-25 MCG/ACT Inhaler 1 puff daily      Supplemental O2 during the test [x]  no [] yes N/Alpm     [] continuous []  intermittent    Device : [] NC []  HFNC    []  oximizer  [] mask      Laps completed: 4 (1 lap = 100 ft)        Baseline (resting) Walking End of Test (recovery)      Heart Rate 74   85  74    BP  110/70   122/70  108/74    Dyspnea  0    3   0 (Antonino scale)    Fatigue  1    5   1 (Antonino scale)    SpO2  94%   92%                 96%         Stopped or paused before 6 mins? []  no [x] yes How long? 1 minute and 6 seconds    Symptoms at end of exercise:[x] angina  [x] dizziness  [x]  hip, leg,knees back pain       []  no complaints [x]  Other blurry vision L eye    Number of laps: 4 (x 30.48 meters) + final partial lap: 27 meters     Total distance walked in 6 mins: 149 meters    Predicted distance:  473 meters  Percent predicted:31%                [] normal       [x] reduced     Summary:          SANTIAGO Zuñiga DO  Pulmonary Rehab Director

## 2024-12-20 ENCOUNTER — CARE COORDINATION (OUTPATIENT)
Dept: CARE COORDINATION | Age: 54
End: 2024-12-20

## 2024-12-20 NOTE — CARE COORDINATION
Ambulatory Care Coordination Note     12/20/2024 11:17 AM     patient outreach attempt by this ACM today to perform care management follow up . ACM was unable to reach the patient by telephone today;   Phone no longer in service     Patient closed (phone out of service and pt had 6 min walk and no oxygen required per chart review) from the High Risk Care Management program on 12/20/2024.  Patient verbalizes confidence in the ability to self-manage at this time. At last call, concern was with getting oxygen , but since than has had 6 mon walk.  Care management goals have been completed. No further Ambulatory Care Manager follow up scheduled.

## 2024-12-27 ENCOUNTER — CARE COORDINATION (OUTPATIENT)
Dept: CARE COORDINATION | Age: 54
End: 2024-12-27

## 2024-12-27 NOTE — CARE COORDINATION
Call from patient  Reports that son's electric is going to be turned off  Provided address/ fax/ phone number        Future Appointments   Date Time Provider Department Center   3/4/2025 11:30 AM Lacy Restrepo APRN [place] [facility] ECC DEP

## 2025-02-15 ENCOUNTER — TELEPHONE (OUTPATIENT)
Dept: CASE MANAGEMENT | Age: 55
End: 2025-02-15

## 2025-03-03 NOTE — PROGRESS NOTES
Date: 3/4/2025                                               Subjective/Objective:     Chief Complaint   Patient presents with    3 Month Follow-Up    Cough     When she coughs the right side of her body goes \"limp\" can't move when this happens. For a few weeks now. Left side of face goes \"dark\"    Headache     All the time       HPI    Bibi Bullock is a 55 yo female, visit today for follow up on chronic medical conditions. Here with her daughter today.      H/o CVA with left ICA stenosis s/p left CEA 9/22/2023.   She endorses compliance with atorvastatin.  Has chronic dizziness, this has unchanged. No syncope.      Depression - sertraline has been very effective. She denies SI.     COPD with hypoxia on advair and PRN albuterol. Previously on oxygen - this was stopped following recent 6 minute walk test. Following with pulmonology (Dr Lawson). Has chronic cough, this seems to change with the weather. For the last few weeks or months whenever she has a coughing fit (daily) her right side of her body goes limp, and her left eye vision feels different. No speech changes with this. No dizziness or chest pain. No syncope. This only occurs when she is coughing. S/p left CEA 9/2023    Taking omeprazole daily on empty stomach. She continues to have epigastric pain, like a big ball, hard rock after eating. Was previously referred to GI, did not see. Denies N/V.     She denies medication side effects.     Has cut back to 5 cigarettes per day, was smoking 10 cigarettes per day. Has goal to quit, quit aides have not been helpful in the past.      Colon cancer screening: needs  Lung Cancer Screening: needs  Gyn:               Last pap smear: needs               Last Mammogram: needs              GYN care managed by: needs         Patient Active Problem List    Diagnosis Date Noted    CVA, old, facial weakness 01/25/2024    Other eosinophilia 01/13/2024    SIRS (systemic inflammatory response syndrome) (HCC) 01/10/2024

## 2025-03-04 ENCOUNTER — TELEPHONE (OUTPATIENT)
Dept: INTERNAL MEDICINE CLINIC | Age: 55
End: 2025-03-04

## 2025-03-04 ENCOUNTER — OFFICE VISIT (OUTPATIENT)
Dept: INTERNAL MEDICINE CLINIC | Age: 55
End: 2025-03-04
Payer: MEDICARE

## 2025-03-04 VITALS
HEIGHT: 67 IN | OXYGEN SATURATION: 95 % | WEIGHT: 232 LBS | BODY MASS INDEX: 36.41 KG/M2 | DIASTOLIC BLOOD PRESSURE: 92 MMHG | SYSTOLIC BLOOD PRESSURE: 124 MMHG | HEART RATE: 100 BPM

## 2025-03-04 DIAGNOSIS — Z00.00 PREVENTATIVE HEALTH CARE: ICD-10-CM

## 2025-03-04 DIAGNOSIS — F41.9 ANXIETY: ICD-10-CM

## 2025-03-04 DIAGNOSIS — I65.22 CAROTID ARTERY STENOSIS, SYMPTOMATIC, LEFT: ICD-10-CM

## 2025-03-04 DIAGNOSIS — I65.23 BILATERAL CAROTID ARTERY STENOSIS: ICD-10-CM

## 2025-03-04 DIAGNOSIS — E78.2 MIXED HYPERLIPIDEMIA: ICD-10-CM

## 2025-03-04 DIAGNOSIS — Z12.31 ENCOUNTER FOR SCREENING MAMMOGRAM FOR MALIGNANT NEOPLASM OF BREAST: ICD-10-CM

## 2025-03-04 DIAGNOSIS — J44.9 CHRONIC OBSTRUCTIVE PULMONARY DISEASE, UNSPECIFIED COPD TYPE (HCC): ICD-10-CM

## 2025-03-04 DIAGNOSIS — F32.A DEPRESSION, UNSPECIFIED DEPRESSION TYPE: Primary | ICD-10-CM

## 2025-03-04 DIAGNOSIS — Z12.11 COLON CANCER SCREENING: ICD-10-CM

## 2025-03-04 DIAGNOSIS — R10.13 EPIGASTRIC PAIN: ICD-10-CM

## 2025-03-04 DIAGNOSIS — Z72.0 TOBACCO ABUSE: ICD-10-CM

## 2025-03-04 PROCEDURE — 99214 OFFICE O/P EST MOD 30 MIN: CPT | Performed by: NURSE PRACTITIONER

## 2025-03-04 PROCEDURE — G2211 COMPLEX E/M VISIT ADD ON: HCPCS | Performed by: NURSE PRACTITIONER

## 2025-03-04 RX ORDER — ASPIRIN 81 MG/1
81 TABLET, CHEWABLE ORAL DAILY
Qty: 90 TABLET | Refills: 1 | Status: SHIPPED | OUTPATIENT
Start: 2025-03-04

## 2025-03-04 RX ORDER — SERTRALINE HYDROCHLORIDE 100 MG/1
100 TABLET, FILM COATED ORAL DAILY
Qty: 90 TABLET | Refills: 1 | Status: SHIPPED | OUTPATIENT
Start: 2025-03-04

## 2025-03-04 RX ORDER — OMEPRAZOLE 20 MG/1
20 CAPSULE, DELAYED RELEASE ORAL
Qty: 90 CAPSULE | Refills: 1 | Status: SHIPPED | OUTPATIENT
Start: 2025-03-04

## 2025-03-04 RX ORDER — FLUTICASONE PROPIONATE AND SALMETEROL XINAFOATE 230; 21 UG/1; UG/1
2 AEROSOL, METERED RESPIRATORY (INHALATION) 2 TIMES DAILY
Qty: 3 EACH | Refills: 1 | Status: SHIPPED | OUTPATIENT
Start: 2025-03-04

## 2025-03-04 RX ORDER — EZETIMIBE 10 MG/1
10 TABLET ORAL DAILY
Qty: 90 TABLET | Refills: 1 | Status: SHIPPED | OUTPATIENT
Start: 2025-03-04

## 2025-03-04 RX ORDER — BENZONATATE 100 MG/1
100 CAPSULE ORAL 3 TIMES DAILY PRN
Qty: 30 CAPSULE | Refills: 0 | Status: SHIPPED | OUTPATIENT
Start: 2025-03-04 | End: 2025-03-14

## 2025-03-04 RX ORDER — ROSUVASTATIN CALCIUM 40 MG/1
40 TABLET, COATED ORAL DAILY
Qty: 90 TABLET | Refills: 1 | Status: SHIPPED | OUTPATIENT
Start: 2025-03-04

## 2025-03-04 RX ORDER — ALBUTEROL SULFATE 90 UG/1
2 INHALANT RESPIRATORY (INHALATION) 4 TIMES DAILY PRN
Qty: 54 G | Refills: 2 | Status: SHIPPED | OUTPATIENT
Start: 2025-03-04

## 2025-03-04 SDOH — ECONOMIC STABILITY: FOOD INSECURITY: WITHIN THE PAST 12 MONTHS, YOU WORRIED THAT YOUR FOOD WOULD RUN OUT BEFORE YOU GOT MONEY TO BUY MORE.: PATIENT DECLINED

## 2025-03-04 SDOH — ECONOMIC STABILITY: FOOD INSECURITY: WITHIN THE PAST 12 MONTHS, THE FOOD YOU BOUGHT JUST DIDN'T LAST AND YOU DIDN'T HAVE MONEY TO GET MORE.: PATIENT DECLINED

## 2025-03-04 ASSESSMENT — COLUMBIA-SUICIDE SEVERITY RATING SCALE - C-SSRS
6. HAVE YOU EVER DONE ANYTHING, STARTED TO DO ANYTHING, OR PREPARED TO DO ANYTHING TO END YOUR LIFE?: NO
2. HAVE YOU ACTUALLY HAD ANY THOUGHTS OF KILLING YOURSELF?: NO
1. WITHIN THE PAST MONTH, HAVE YOU WISHED YOU WERE DEAD OR WISHED YOU COULD GO TO SLEEP AND NOT WAKE UP?: NO

## 2025-03-04 ASSESSMENT — PATIENT HEALTH QUESTIONNAIRE - PHQ9
9. THOUGHTS THAT YOU WOULD BE BETTER OFF DEAD, OR OF HURTING YOURSELF: NOT AT ALL
SUM OF ALL RESPONSES TO PHQ QUESTIONS 1-9: 14
2. FEELING DOWN, DEPRESSED OR HOPELESS: NOT AT ALL
7. TROUBLE CONCENTRATING ON THINGS, SUCH AS READING THE NEWSPAPER OR WATCHING TELEVISION: NEARLY EVERY DAY
10. IF YOU CHECKED OFF ANY PROBLEMS, HOW DIFFICULT HAVE THESE PROBLEMS MADE IT FOR YOU TO DO YOUR WORK, TAKE CARE OF THINGS AT HOME, OR GET ALONG WITH OTHER PEOPLE: NOT DIFFICULT AT ALL
3. TROUBLE FALLING OR STAYING ASLEEP: NEARLY EVERY DAY
5. POOR APPETITE OR OVEREATING: MORE THAN HALF THE DAYS
SUM OF ALL RESPONSES TO PHQ QUESTIONS 1-9: 14
8. MOVING OR SPEAKING SO SLOWLY THAT OTHER PEOPLE COULD HAVE NOTICED. OR THE OPPOSITE, BEING SO FIGETY OR RESTLESS THAT YOU HAVE BEEN MOVING AROUND A LOT MORE THAN USUAL: MORE THAN HALF THE DAYS
SUM OF ALL RESPONSES TO PHQ QUESTIONS 1-9: 14
4. FEELING TIRED OR HAVING LITTLE ENERGY: NEARLY EVERY DAY
1. LITTLE INTEREST OR PLEASURE IN DOING THINGS: NOT AT ALL
6. FEELING BAD ABOUT YOURSELF - OR THAT YOU ARE A FAILURE OR HAVE LET YOURSELF OR YOUR FAMILY DOWN: SEVERAL DAYS
SUM OF ALL RESPONSES TO PHQ QUESTIONS 1-9: 14

## 2025-03-04 ASSESSMENT — ENCOUNTER SYMPTOMS
WHEEZING: 0
COUGH: 1

## 2025-03-04 NOTE — TELEPHONE ENCOUNTER
Rorym for pt and family. Lacy ordered an ultrasound to get done at [facility].     [phone] to schedule      This if for her tobacco use.

## 2025-03-04 NOTE — PATIENT INSTRUCTIONS
Schedule with GI doctor   CT scan of head/neck [phone]  Mammogram [phone]  Start zetia (cholesterol medication)   Tessalon - cough medicine

## 2025-04-13 ENCOUNTER — TELEPHONE (OUTPATIENT)
Dept: CASE MANAGEMENT | Age: 55
End: 2025-04-13

## 2025-04-24 ENCOUNTER — HOSPITAL ENCOUNTER (EMERGENCY)
Age: 55
Discharge: HOME | End: 2025-04-24
Payer: MEDICARE

## 2025-04-24 ENCOUNTER — APPOINTMENT (OUTPATIENT)
Dept: GENERAL RADIOLOGY | Age: 55
End: 2025-04-24
Payer: MEDICARE

## 2025-04-24 VITALS
RESPIRATION RATE: 18 BRPM | TEMPERATURE: 98.1 F | DIASTOLIC BLOOD PRESSURE: 84 MMHG | SYSTOLIC BLOOD PRESSURE: 131 MMHG | HEART RATE: 78 BPM | OXYGEN SATURATION: 92 %

## 2025-04-24 DIAGNOSIS — J02.9 ACUTE PHARYNGITIS, UNSPECIFIED ETIOLOGY: ICD-10-CM

## 2025-04-24 DIAGNOSIS — J44.1 COPD EXACERBATION (HCC): Primary | ICD-10-CM

## 2025-04-24 LAB
ALBUMIN SERPL-MCNC: 4.1 G/DL (ref 3.4–5)
ALBUMIN/GLOB SERPL: 1.2 {RATIO} (ref 1.1–2.2)
ALP SERPL-CCNC: 89 U/L (ref 40–129)
ALT SERPL-CCNC: 14 U/L (ref 10–40)
ANION GAP SERPL CALCULATED.3IONS-SCNC: 13 MMOL/L (ref 3–16)
AST SERPL-CCNC: 24 U/L (ref 15–37)
BASE EXCESS BLDV CALC-SCNC: 5.5 MMOL/L
BASOPHILS # BLD: 0.1 K/UL (ref 0–0.2)
BASOPHILS NFR BLD: 1 %
BILIRUB SERPL-MCNC: 0.3 MG/DL (ref 0–1)
BUN SERPL-MCNC: 8 MG/DL (ref 7–20)
CALCIUM SERPL-MCNC: 9.1 MG/DL (ref 8.3–10.6)
CHLORIDE SERPL-SCNC: 98 MMOL/L (ref 99–110)
CO2 BLDV-SCNC: 31 MMOL/L
CO2 SERPL-SCNC: 27 MMOL/L (ref 21–32)
COHGB MFR BLDV: 9.1 %
CREAT SERPL-MCNC: 0.7 MG/DL (ref 0.6–1.1)
DEPRECATED RDW RBC AUTO: 14.8 % (ref 12.4–15.4)
EOSINOPHIL # BLD: 0.3 K/UL (ref 0–0.6)
EOSINOPHIL NFR BLD: 2.3 %
FLUAV + FLUBV AG NOSE IA.RAPID: NOT DETECTED
FLUAV + FLUBV AG NOSE IA.RAPID: NOT DETECTED
GFR SERPLBLD CREATININE-BSD FMLA CKD-EPI: >90 ML/MIN/{1.73_M2}
GLUCOSE SERPL-MCNC: 114 MG/DL (ref 70–99)
HCO3 BLDV-SCNC: 30 MMOL/L (ref 23–29)
HCT VFR BLD AUTO: 40.7 % (ref 36–48)
HGB BLD-MCNC: 13.2 G/DL (ref 12–16)
LYMPHOCYTES # BLD: 3.5 K/UL (ref 1–5.1)
LYMPHOCYTES NFR BLD: 29.6 %
MAGNESIUM SERPL-MCNC: 1.86 MG/DL (ref 1.8–2.4)
MCH RBC QN AUTO: 28.3 PG (ref 26–34)
MCHC RBC AUTO-ENTMCNC: 32.6 G/DL (ref 31–36)
MCV RBC AUTO: 87 FL (ref 80–100)
METHGB MFR BLDV: 0.7 %
MONOCYTES # BLD: 0.9 K/UL (ref 0–1.3)
MONOCYTES NFR BLD: 7.6 %
NEUTROPHILS # BLD: 7.1 K/UL (ref 1.7–7.7)
NEUTROPHILS NFR BLD: 59.5 %
NT-PROBNP SERPL-MCNC: 155 PG/ML (ref 0–124)
O2 THERAPY: ABNORMAL
PCO2 BLDV: 42.1 MMHG (ref 40–50)
PH BLDV: 7.46 [PH] (ref 7.35–7.45)
PLATELET # BLD AUTO: 326 K/UL (ref 135–450)
PMV BLD AUTO: 8.2 FL (ref 5–10.5)
PO2 BLDV: 87 MMHG
POTASSIUM SERPL-SCNC: 3.4 MMOL/L (ref 3.5–5.1)
PROT SERPL-MCNC: 7.5 G/DL (ref 6.4–8.2)
RBC # BLD AUTO: 4.67 M/UL (ref 4–5.2)
SAO2 % BLDV: 98 %
SARS-COV-2 RDRP RESP QL NAA+PROBE: NOT DETECTED
SODIUM SERPL-SCNC: 138 MMOL/L (ref 136–145)
WBC # BLD AUTO: 11.9 K/UL (ref 4–11)

## 2025-04-24 PROCEDURE — 94640 AIRWAY INHALATION TREATMENT: CPT

## 2025-04-24 PROCEDURE — 83880 ASSAY OF NATRIURETIC PEPTIDE: CPT

## 2025-04-24 PROCEDURE — 36415 COLL VENOUS BLD VENIPUNCTURE: CPT

## 2025-04-24 PROCEDURE — 93005 ELECTROCARDIOGRAM TRACING: CPT | Performed by: PHYSICIAN ASSISTANT

## 2025-04-24 PROCEDURE — 96374 THER/PROPH/DIAG INJ IV PUSH: CPT

## 2025-04-24 PROCEDURE — 94760 N-INVAS EAR/PLS OXIMETRY 1: CPT

## 2025-04-24 PROCEDURE — 71046 X-RAY EXAM CHEST 2 VIEWS: CPT

## 2025-04-24 PROCEDURE — 85025 COMPLETE CBC W/AUTO DIFF WBC: CPT

## 2025-04-24 PROCEDURE — 99285 EMERGENCY DEPT VISIT HI MDM: CPT

## 2025-04-24 PROCEDURE — 6370000000 HC RX 637 (ALT 250 FOR IP): Performed by: PHYSICIAN ASSISTANT

## 2025-04-24 PROCEDURE — 82803 BLOOD GASES ANY COMBINATION: CPT

## 2025-04-24 PROCEDURE — 87502 INFLUENZA DNA AMP PROBE: CPT

## 2025-04-24 PROCEDURE — 83735 ASSAY OF MAGNESIUM: CPT

## 2025-04-24 PROCEDURE — 80053 COMPREHEN METABOLIC PANEL: CPT

## 2025-04-24 PROCEDURE — 6360000002 HC RX W HCPCS: Performed by: PHYSICIAN ASSISTANT

## 2025-04-24 PROCEDURE — 87635 SARS-COV-2 COVID-19 AMP PRB: CPT

## 2025-04-24 RX ORDER — METHYLPREDNISOLONE SODIUM SUCCINATE 125 MG/2ML
125 INJECTION INTRAMUSCULAR; INTRAVENOUS ONCE
Status: COMPLETED | OUTPATIENT
Start: 2025-04-24 | End: 2025-04-24

## 2025-04-24 RX ORDER — METHYLPREDNISOLONE 4 MG/1
TABLET ORAL
Qty: 1 KIT | Refills: 0 | Status: SHIPPED | OUTPATIENT
Start: 2025-04-24 | End: 2025-04-30

## 2025-04-24 RX ORDER — ACETAMINOPHEN 500 MG
1000 TABLET ORAL EVERY 8 HOURS PRN
Qty: 60 TABLET | Refills: 0 | Status: SHIPPED | OUTPATIENT
Start: 2025-04-24 | End: 2025-05-04

## 2025-04-24 RX ORDER — ACETAMINOPHEN 325 MG/1
650 TABLET ORAL ONCE
Status: COMPLETED | OUTPATIENT
Start: 2025-04-24 | End: 2025-04-24

## 2025-04-24 RX ORDER — LIDOCAINE HYDROCHLORIDE 20 MG/ML
15 SOLUTION OROPHARYNGEAL ONCE
Status: COMPLETED | OUTPATIENT
Start: 2025-04-24 | End: 2025-04-24

## 2025-04-24 RX ORDER — IPRATROPIUM BROMIDE AND ALBUTEROL SULFATE 2.5; .5 MG/3ML; MG/3ML
1 SOLUTION RESPIRATORY (INHALATION) ONCE
Status: COMPLETED | OUTPATIENT
Start: 2025-04-24 | End: 2025-04-24

## 2025-04-24 RX ADMIN — IPRATROPIUM BROMIDE AND ALBUTEROL SULFATE 1 DOSE: .5; 3 SOLUTION RESPIRATORY (INHALATION) at 16:43

## 2025-04-24 RX ADMIN — METHYLPREDNISOLONE SODIUM SUCCINATE 125 MG: 125 INJECTION INTRAMUSCULAR; INTRAVENOUS at 16:41

## 2025-04-24 RX ADMIN — ACETAMINOPHEN 650 MG: 325 TABLET ORAL at 16:41

## 2025-04-24 RX ADMIN — LIDOCAINE HYDROCHLORIDE 15 ML: 20 SOLUTION ORAL at 18:51

## 2025-04-24 ASSESSMENT — PAIN SCALES - GENERAL: PAINLEVEL_OUTOF10: 7

## 2025-04-24 NOTE — ED NOTES
D/C: Order noted for d/c. Pt confirmed d/c paperwork have correct name. Discharge and education instructions reviewed with patient. Teach-back successful.  Pt verbalized understanding. Pt denied questions at this time. No acute distress noted. Patient instructed to follow-up as noted - return to emergency department if symptoms worsen. Patient verbalized understanding. Discharged per EDMD with discharge instructions. Pt discharged to private vehicle. Patient stable upon departure. Thanked patient for choosing [facility] for care.

## 2025-04-25 LAB
EKG ATRIAL RATE: 82 BPM
EKG DIAGNOSIS: NORMAL
EKG P AXIS: 45 DEGREES
EKG P-R INTERVAL: 150 MS
EKG Q-T INTERVAL: 398 MS
EKG QRS DURATION: 90 MS
EKG QTC CALCULATION (BAZETT): 464 MS
EKG R AXIS: 34 DEGREES
EKG T AXIS: 56 DEGREES
EKG VENTRICULAR RATE: 82 BPM

## 2025-04-25 PROCEDURE — 93010 ELECTROCARDIOGRAM REPORT: CPT | Performed by: INTERNAL MEDICINE

## 2025-04-28 ENCOUNTER — OFFICE VISIT (OUTPATIENT)
Dept: INTERNAL MEDICINE CLINIC | Age: 55
End: 2025-04-28
Payer: MEDICARE

## 2025-04-28 VITALS
OXYGEN SATURATION: 92 % | WEIGHT: 236 LBS | DIASTOLIC BLOOD PRESSURE: 82 MMHG | SYSTOLIC BLOOD PRESSURE: 122 MMHG | BODY MASS INDEX: 38.09 KG/M2 | HEART RATE: 79 BPM

## 2025-04-28 DIAGNOSIS — H66.003 ACUTE SUPPURATIVE OTITIS MEDIA OF BOTH EARS WITHOUT SPONTANEOUS RUPTURE OF TYMPANIC MEMBRANES, RECURRENCE NOT SPECIFIED: ICD-10-CM

## 2025-04-28 DIAGNOSIS — J02.9 PHARYNGITIS, UNSPECIFIED ETIOLOGY: ICD-10-CM

## 2025-04-28 DIAGNOSIS — E87.6 HYPOKALEMIA: ICD-10-CM

## 2025-04-28 DIAGNOSIS — J44.1 COPD EXACERBATION (HCC): Primary | ICD-10-CM

## 2025-04-28 LAB — POTASSIUM SERPL-SCNC: 4.4 MMOL/L (ref 3.5–5.1)

## 2025-04-28 PROCEDURE — 4004F PT TOBACCO SCREEN RCVD TLK: CPT | Performed by: NURSE PRACTITIONER

## 2025-04-28 PROCEDURE — G2211 COMPLEX E/M VISIT ADD ON: HCPCS | Performed by: NURSE PRACTITIONER

## 2025-04-28 PROCEDURE — 36415 COLL VENOUS BLD VENIPUNCTURE: CPT | Performed by: NURSE PRACTITIONER

## 2025-04-28 PROCEDURE — 3023F SPIROM DOC REV: CPT | Performed by: NURSE PRACTITIONER

## 2025-04-28 PROCEDURE — 87880 STREP A ASSAY W/OPTIC: CPT | Performed by: NURSE PRACTITIONER

## 2025-04-28 PROCEDURE — 3017F COLORECTAL CA SCREEN DOC REV: CPT | Performed by: NURSE PRACTITIONER

## 2025-04-28 PROCEDURE — G8427 DOCREV CUR MEDS BY ELIG CLIN: HCPCS | Performed by: NURSE PRACTITIONER

## 2025-04-28 PROCEDURE — G8417 CALC BMI ABV UP PARAM F/U: HCPCS | Performed by: NURSE PRACTITIONER

## 2025-04-28 PROCEDURE — 99214 OFFICE O/P EST MOD 30 MIN: CPT | Performed by: NURSE PRACTITIONER

## 2025-04-28 RX ORDER — BENZONATATE 100 MG/1
100 CAPSULE ORAL 3 TIMES DAILY PRN
Qty: 30 CAPSULE | Refills: 0 | Status: SHIPPED | OUTPATIENT
Start: 2025-04-28 | End: 2025-05-08

## 2025-04-28 ASSESSMENT — ENCOUNTER SYMPTOMS
VOMITING: 0
NAUSEA: 0
COUGH: 1
WHEEZING: 0
SHORTNESS OF BREATH: 1
SORE THROAT: 1

## 2025-04-28 NOTE — PROGRESS NOTES
Date: 4/28/2025                                               Subjective/Objective:     Chief Complaint   Patient presents with    Cough    Follow-up       HPI    Bibi Bullock is a 53 yo female, visit today for ER follow up. See in ER 4 days ago for c/o cough and throat pain. The pain began several weeks ago and has been worsening. Feels like glass, very painful to swallow. Cough has been aggravating the pain. Cold water feels soothing too. Denies fever/chills. Was given steroid, has not noticed improvement. She has chronic cough that is worsened and more productive. She c/o associated bilateral ear pain. Denies N/V.     ER work up reviewed;   Potassium 3.4  CXR no acute findings  Flu/COVID negative         Patient Active Problem List    Diagnosis Date Noted    CVA, old, facial weakness 01/25/2024    Other eosinophilia 01/13/2024    SIRS (systemic inflammatory response syndrome) ([facility]) 01/10/2024    Class 2 severe obesity with serious comorbidity in adult ([facility]) 01/10/2024    Left arm weakness 10/17/2023    Stuttering 10/17/2023    Left carotid artery occlusion 10/16/2023    TIA (transient ischemic attack) 09/18/2023    Carotid artery stenosis, symptomatic, left 09/18/2023    Anxiety 08/18/2023    Pure hypercholesterolemia 07/10/2023    Internal carotid artery stenosis, left 06/27/2023    Asthma-COPD overlap syndrome (HCC) 06/26/2023    Pre-syncope 06/26/2023    Palpitations 06/26/2023    Arthritis of right knee 05/31/2018    Chronic pain of right knee 05/31/2018    Hypothyroidism 05/30/2018    Prediabetes 05/24/2018    Depression 05/24/2018    RLS (restless legs syndrome) 05/24/2018    Other hyperlipidemia 05/24/2018    Acute respiratory failure with hypoxia ([facility]) 12/18/2017    Sensorineural hearing loss, bilateral 02/04/2016    Smoker 11/21/2014       Past Medical History:   Diagnosis Date    Acid reflux     Anxiety     Arthritis     knees    Asthma     COPD (chronic obstructive pulmonary disease) ([facility])

## 2025-04-28 NOTE — PATIENT INSTRUCTIONS
Tessalon (cough medicine) if needed  Complete steroid  Augmentin, antibiotic  If ear pain does not resolve please follow up

## 2025-04-29 ENCOUNTER — RESULTS FOLLOW-UP (OUTPATIENT)
Dept: INTERNAL MEDICINE CLINIC | Age: 55
End: 2025-04-29

## 2025-04-29 DIAGNOSIS — J02.9 PHARYNGITIS, UNSPECIFIED ETIOLOGY: Primary | ICD-10-CM

## 2025-05-01 LAB — BACTERIA THROAT AEROBE CULT: NORMAL

## 2025-05-02 NOTE — ED PROVIDER NOTES
Inhalation Given 4/24/25 1643)   methylPREDNISolone sodium succ (SOLU-MEDROL) injection 125 mg (125 mg IntraVENous Given 4/24/25 1641)   acetaminophen (TYLENOL) tablet 650 mg (650 mg Oral Given 4/24/25 1641)   lidocaine viscous hcl (XYLOCAINE) 2 % solution 15 mL (15 mLs Mouth/Throat Given 4/24/25 1851)             Chronic Conditions affecting care:    has a past medical history of Acid reflux, Anxiety, Arthritis, Asthma, COPD (chronic obstructive pulmonary disease) (HCC), Depression, Endometriosis, Fatty liver, blood clots, Hyperlipidemia, Knee pain, TIA (transient ischemic attack) (2023), and Wears glasses.    CONSULTS: (Who and What was discussed)  None      Records Reviewed (External, Source and Summary)     CC/HPI Summary, DDx, ED Course, and Reassessment: 54-year-old well-appearing female presenting to the emergency department with above complaints of cough, pharyngitis.  She has mild leukocytosis of 11.9 K which is consistent with her priors.  She has mild electrolyte disturbance of potassium at 3.4.  Otherwise electrolytes, kidney function LFTs within normal limits.  A COVID and flu are negative.  Chest x-ray shows no acute findings.  She was given DuoNeb, Solu-Medrol, Tylenol and viscous lidocaine here.  She did have symptom improvement.  I discussed with her the importance of following up with her primary care physician for further evaluation.  At this time this is believed to be viral in nature.  Will hold off on further imaging of the throat is low suspicion for abscess or other acute finding at this time.  Will discharge with cepacol mouthwash, Solu-Medrol and acetaminophen.  Strict return precautions given.    Disposition Considerations (tests considered but not done, Admit vs D/C, Shared Decision Making, Pt Expectation of Test or Tx.): considered CT neck, uvula midline, no exudates, no voice change, low suspicion for life threatening disease.      The patient tolerated their visit well.  The patient

## 2025-05-12 ENCOUNTER — OFFICE VISIT (OUTPATIENT)
Dept: ENT CLINIC | Age: 55
End: 2025-05-12
Payer: MEDICARE

## 2025-05-12 VITALS
HEART RATE: 73 BPM | WEIGHT: 240 LBS | BODY MASS INDEX: 38.57 KG/M2 | HEIGHT: 66 IN | DIASTOLIC BLOOD PRESSURE: 74 MMHG | SYSTOLIC BLOOD PRESSURE: 126 MMHG

## 2025-05-12 DIAGNOSIS — K21.9 LARYNGOPHARYNGEAL REFLUX (LPR): ICD-10-CM

## 2025-05-12 DIAGNOSIS — F17.200 SMOKING ADDICTION: ICD-10-CM

## 2025-05-12 DIAGNOSIS — R49.0 DYSPHONIA: Primary | ICD-10-CM

## 2025-05-12 DIAGNOSIS — J39.2 OROPHARYNGEAL LESION: ICD-10-CM

## 2025-05-12 PROCEDURE — 31575 DIAGNOSTIC LARYNGOSCOPY: CPT | Performed by: OTOLARYNGOLOGY

## 2025-05-12 PROCEDURE — G8427 DOCREV CUR MEDS BY ELIG CLIN: HCPCS | Performed by: OTOLARYNGOLOGY

## 2025-05-12 PROCEDURE — G8417 CALC BMI ABV UP PARAM F/U: HCPCS | Performed by: OTOLARYNGOLOGY

## 2025-05-12 PROCEDURE — 3017F COLORECTAL CA SCREEN DOC REV: CPT | Performed by: OTOLARYNGOLOGY

## 2025-05-12 PROCEDURE — 4004F PT TOBACCO SCREEN RCVD TLK: CPT | Performed by: OTOLARYNGOLOGY

## 2025-05-12 PROCEDURE — 99204 OFFICE O/P NEW MOD 45 MIN: CPT | Performed by: OTOLARYNGOLOGY

## 2025-05-12 RX ORDER — PANTOPRAZOLE SODIUM 40 MG/1
40 TABLET, DELAYED RELEASE ORAL
Qty: 90 TABLET | Refills: 1 | Status: SHIPPED | OUTPATIENT
Start: 2025-05-12

## 2025-05-12 NOTE — PROGRESS NOTES
[facility]  DIVISION OF OTOLARYNGOLOGY- HEAD & NECK SURGERY  CONSULT      Patient Name: Bibi Bullock  Medical Record Number:  [id number]  Primary Care Physician:  Lacy Restrepo APRN  Date of Consultation: 5/12/2025    Chief Complaint: Hoarseness        HISTORY OF PRESENT ILLNESS  Bibi is a(n) 54 y.o. female who presents for evaluation of hoarseness.  She has had this issue off and on for a long time.  She did have what sounds like a carotid endarterectomy on the left side at some point, but says that the hoarseness was there prior to that.  She has a smoker.  She denies unintentional weight loss, coughing up blood or other symptoms.  She says sometimes she does get really bad coughing episodes.  She is on acid reflux medication.            REVIEW OF SYSTEMS  As above    PHYSICAL EXAM  GENERAL: No Acute Distress, Alert and Oriented, no Hoarseness, strong voice  EYES: EOMI, Anti-icteric  HENT:   Head: Normocephalic and atraumatic.   Face:  Symmetric, facial nerve intact, no sinus tenderness  Right Ear: Normal external ear, normal external auditory canal, intact tympanic membrane with normal mobility and aerated middle ear  Left Ear: Myringosclerosis with intact tympanic membrane and aerated middle ear  Mouth/Oral Cavity:  normal lips, Uvula is midline, no mucosal lesions, no trismus  Oropharynx/Larynx:  normal oropharynx,  Nose:Normal external nasal appearance.    NECK: Normal range of motion, no thyromegaly, trachea is midline, no lymphadenopathy, no neck masses, no crepitus            PROCEDURE  Flexible laryngoscopy  Afrin and lidocaine were applied the nasal cavity.  A flexible scope was passed to the nasal cavity.  Nasopharynx was normal.  Base of tongue showed very large lingual tonsils that were relatively symmetric.  She had significant changes of the bilateral vocal cords.  This was white in coloration.  Difficult to tell if there was true dysplasia.  Vocal cord mobility seem to be

## 2025-05-16 ENCOUNTER — TELEPHONE (OUTPATIENT)
Dept: CASE MANAGEMENT | Age: 55
End: 2025-05-16

## 2025-05-28 ENCOUNTER — HOSPITAL ENCOUNTER (OUTPATIENT)
Dept: ULTRASOUND IMAGING | Age: 55
Discharge: HOME | End: 2025-05-28
Payer: MEDICARE

## 2025-05-28 ENCOUNTER — HOSPITAL ENCOUNTER (OUTPATIENT)
Dept: WOMENS IMAGING | Age: 55
Discharge: HOME | End: 2025-05-28
Payer: MEDICARE

## 2025-05-28 VITALS — HEIGHT: 66 IN | BODY MASS INDEX: 38.09 KG/M2 | WEIGHT: 237 LBS

## 2025-05-28 DIAGNOSIS — Z12.31 ENCOUNTER FOR SCREENING MAMMOGRAM FOR MALIGNANT NEOPLASM OF BREAST: ICD-10-CM

## 2025-05-28 DIAGNOSIS — Z72.0 TOBACCO ABUSE: ICD-10-CM

## 2025-05-28 PROCEDURE — 76706 US ABDL AORTA SCREEN AAA: CPT

## 2025-05-28 PROCEDURE — 77063 BREAST TOMOSYNTHESIS BI: CPT

## 2025-05-29 ENCOUNTER — RESULTS FOLLOW-UP (OUTPATIENT)
Dept: INTERNAL MEDICINE CLINIC | Age: 55
End: 2025-05-29

## 2025-05-30 ENCOUNTER — HOSPITAL ENCOUNTER (EMERGENCY)
Age: 55
Discharge: HOME | End: 2025-05-30
Payer: MEDICARE

## 2025-05-30 ENCOUNTER — APPOINTMENT (OUTPATIENT)
Dept: GENERAL RADIOLOGY | Age: 55
End: 2025-05-30
Payer: MEDICARE

## 2025-05-30 ENCOUNTER — OFFICE VISIT (OUTPATIENT)
Dept: INTERNAL MEDICINE CLINIC | Age: 55
End: 2025-05-30
Payer: MEDICARE

## 2025-05-30 VITALS
HEART RATE: 70 BPM | BODY MASS INDEX: 38.76 KG/M2 | WEIGHT: 241.18 LBS | SYSTOLIC BLOOD PRESSURE: 122 MMHG | TEMPERATURE: 97.7 F | RESPIRATION RATE: 22 BRPM | HEIGHT: 66 IN | DIASTOLIC BLOOD PRESSURE: 73 MMHG | OXYGEN SATURATION: 92 %

## 2025-05-30 VITALS
DIASTOLIC BLOOD PRESSURE: 90 MMHG | HEART RATE: 74 BPM | BODY MASS INDEX: 38.73 KG/M2 | OXYGEN SATURATION: 90 % | SYSTOLIC BLOOD PRESSURE: 150 MMHG | HEIGHT: 66 IN | WEIGHT: 241 LBS

## 2025-05-30 DIAGNOSIS — J44.1 COPD EXACERBATION (HCC): ICD-10-CM

## 2025-05-30 DIAGNOSIS — J45.41 MODERATE PERSISTENT ASTHMATIC BRONCHITIS WITH ACUTE EXACERBATION: Primary | ICD-10-CM

## 2025-05-30 DIAGNOSIS — J44.1 COPD EXACERBATION (HCC): Primary | ICD-10-CM

## 2025-05-30 DIAGNOSIS — G47.39 SLEEP APNEA-LIKE BEHAVIOR: ICD-10-CM

## 2025-05-30 DIAGNOSIS — F17.200 TOBACCO DEPENDENCE: ICD-10-CM

## 2025-05-30 DIAGNOSIS — R09.02 HYPOXIA: ICD-10-CM

## 2025-05-30 DIAGNOSIS — R01.1 HEART MURMUR: ICD-10-CM

## 2025-05-30 DIAGNOSIS — M17.12 OSTEOARTHRITIS OF LEFT KNEE, UNSPECIFIED OSTEOARTHRITIS TYPE: ICD-10-CM

## 2025-05-30 LAB
ANION GAP SERPL CALCULATED.3IONS-SCNC: 15 MMOL/L (ref 3–16)
BASE EXCESS BLDV CALC-SCNC: 7.9 MMOL/L
BASOPHILS # BLD: 0 K/UL (ref 0–0.2)
BASOPHILS NFR BLD: 0.3 %
BUN SERPL-MCNC: 10 MG/DL (ref 7–20)
CALCIUM SERPL-MCNC: 9.6 MG/DL (ref 8.3–10.6)
CHLORIDE SERPL-SCNC: 101 MMOL/L (ref 99–110)
CO2 BLDV-SCNC: 36 MMOL/L
CO2 SERPL-SCNC: 26 MMOL/L (ref 21–32)
COHGB MFR BLDV: 8 %
CREAT SERPL-MCNC: 0.5 MG/DL (ref 0.6–1.1)
DEPRECATED RDW RBC AUTO: 15.2 % (ref 12.4–15.4)
EOSINOPHIL # BLD: 0.2 K/UL (ref 0–0.6)
EOSINOPHIL NFR BLD: 1.7 %
FLUAV + FLUBV AG NOSE IA.RAPID: NOT DETECTED
FLUAV + FLUBV AG NOSE IA.RAPID: NOT DETECTED
GFR SERPLBLD CREATININE-BSD FMLA CKD-EPI: >90 ML/MIN/{1.73_M2}
GLUCOSE SERPL-MCNC: 95 MG/DL (ref 70–99)
HCO3 BLDV-SCNC: 34 MMOL/L (ref 23–29)
HCT VFR BLD AUTO: 41.5 % (ref 36–48)
HGB BLD-MCNC: 13.8 G/DL (ref 12–16)
LYMPHOCYTES # BLD: 3.2 K/UL (ref 1–5.1)
LYMPHOCYTES NFR BLD: 29.7 %
MAGNESIUM SERPL-MCNC: 1.89 MG/DL (ref 1.8–2.4)
MCH RBC QN AUTO: 28.3 PG (ref 26–34)
MCHC RBC AUTO-ENTMCNC: 33.3 G/DL (ref 31–36)
MCV RBC AUTO: 85.2 FL (ref 80–100)
METHGB MFR BLDV: 0.4 %
MONOCYTES # BLD: 0.9 K/UL (ref 0–1.3)
MONOCYTES NFR BLD: 8.2 %
NEUTROPHILS # BLD: 6.5 K/UL (ref 1.7–7.7)
NEUTROPHILS NFR BLD: 60.1 %
O2 THERAPY: ABNORMAL
PCO2 BLDV: 54.5 MMHG (ref 40–50)
PH BLDV: 7.41 [PH] (ref 7.35–7.45)
PLATELET # BLD AUTO: 401 K/UL (ref 135–450)
PMV BLD AUTO: 7.5 FL (ref 5–10.5)
PO2 BLDV: 37 MMHG
POTASSIUM SERPL-SCNC: 3.4 MMOL/L (ref 3.5–5.1)
RBC # BLD AUTO: 4.87 M/UL (ref 4–5.2)
REASON FOR REJECTION: NORMAL
REJECTED TEST: NORMAL
SAO2 % BLDV: 74 %
SARS-COV-2 RDRP RESP QL NAA+PROBE: NOT DETECTED
SODIUM SERPL-SCNC: 142 MMOL/L (ref 136–145)
TROPONIN, HIGH SENSITIVITY: <6 NG/L (ref 0–14)
WBC # BLD AUTO: 10.7 K/UL (ref 4–11)

## 2025-05-30 PROCEDURE — 94761 N-INVAS EAR/PLS OXIMETRY MLT: CPT

## 2025-05-30 PROCEDURE — 4004F PT TOBACCO SCREEN RCVD TLK: CPT | Performed by: NURSE PRACTITIONER

## 2025-05-30 PROCEDURE — G8417 CALC BMI ABV UP PARAM F/U: HCPCS | Performed by: NURSE PRACTITIONER

## 2025-05-30 PROCEDURE — 93005 ELECTROCARDIOGRAM TRACING: CPT | Performed by: STUDENT IN AN ORGANIZED HEALTH CARE EDUCATION/TRAINING PROGRAM

## 2025-05-30 PROCEDURE — G8427 DOCREV CUR MEDS BY ELIG CLIN: HCPCS | Performed by: NURSE PRACTITIONER

## 2025-05-30 PROCEDURE — 87502 INFLUENZA DNA AMP PROBE: CPT

## 2025-05-30 PROCEDURE — 87635 SARS-COV-2 COVID-19 AMP PRB: CPT

## 2025-05-30 PROCEDURE — 94640 AIRWAY INHALATION TREATMENT: CPT

## 2025-05-30 PROCEDURE — 84484 ASSAY OF TROPONIN QUANT: CPT

## 2025-05-30 PROCEDURE — 99214 OFFICE O/P EST MOD 30 MIN: CPT | Performed by: NURSE PRACTITIONER

## 2025-05-30 PROCEDURE — 85025 COMPLETE CBC W/AUTO DIFF WBC: CPT

## 2025-05-30 PROCEDURE — 80048 BASIC METABOLIC PNL TOTAL CA: CPT

## 2025-05-30 PROCEDURE — 6370000000 HC RX 637 (ALT 250 FOR IP): Performed by: NURSE PRACTITIONER

## 2025-05-30 PROCEDURE — 82803 BLOOD GASES ANY COMBINATION: CPT

## 2025-05-30 PROCEDURE — 71046 X-RAY EXAM CHEST 2 VIEWS: CPT

## 2025-05-30 PROCEDURE — 83735 ASSAY OF MAGNESIUM: CPT

## 2025-05-30 PROCEDURE — 6360000002 HC RX W HCPCS: Performed by: NURSE PRACTITIONER

## 2025-05-30 PROCEDURE — 99285 EMERGENCY DEPT VISIT HI MDM: CPT

## 2025-05-30 PROCEDURE — 36415 COLL VENOUS BLD VENIPUNCTURE: CPT

## 2025-05-30 PROCEDURE — 3023F SPIROM DOC REV: CPT | Performed by: NURSE PRACTITIONER

## 2025-05-30 PROCEDURE — 3017F COLORECTAL CA SCREEN DOC REV: CPT | Performed by: NURSE PRACTITIONER

## 2025-05-30 PROCEDURE — 96374 THER/PROPH/DIAG INJ IV PUSH: CPT

## 2025-05-30 RX ORDER — GUAIFENESIN/DEXTROMETHORPHAN 100-10MG/5
10 SYRUP ORAL 3 TIMES DAILY PRN
Qty: 120 ML | Refills: 0 | Status: SHIPPED | OUTPATIENT
Start: 2025-05-30 | End: 2025-06-09

## 2025-05-30 RX ORDER — IPRATROPIUM BROMIDE AND ALBUTEROL SULFATE 2.5; .5 MG/3ML; MG/3ML
1 SOLUTION RESPIRATORY (INHALATION) ONCE
Status: COMPLETED | OUTPATIENT
Start: 2025-05-30 | End: 2025-05-30

## 2025-05-30 RX ORDER — PREDNISONE 10 MG/1
TABLET ORAL
Qty: 30 TABLET | Refills: 0 | Status: CANCELLED | OUTPATIENT
Start: 2025-05-30 | End: 2025-06-09

## 2025-05-30 RX ORDER — ALBUTEROL SULFATE 0.83 MG/ML
2.5 SOLUTION RESPIRATORY (INHALATION) 4 TIMES DAILY PRN
Qty: 120 EACH | Refills: 3 | Status: CANCELLED | OUTPATIENT
Start: 2025-05-30

## 2025-05-30 RX ORDER — PREDNISONE 20 MG/1
TABLET ORAL
Qty: 18 TABLET | Refills: 0 | Status: SHIPPED | OUTPATIENT
Start: 2025-05-30 | End: 2025-06-09

## 2025-05-30 RX ORDER — NEBULIZER ACCESSORIES
1 KIT MISCELLANEOUS DAILY PRN
Qty: 1 KIT | Refills: 0 | Status: CANCELLED | OUTPATIENT
Start: 2025-05-30

## 2025-05-30 RX ORDER — DEXAMETHASONE SODIUM PHOSPHATE 4 MG/ML
10 INJECTION, SOLUTION INTRA-ARTICULAR; INTRALESIONAL; INTRAMUSCULAR; INTRAVENOUS; SOFT TISSUE ONCE
Status: COMPLETED | OUTPATIENT
Start: 2025-05-30 | End: 2025-05-30

## 2025-05-30 RX ORDER — DOXYCYCLINE HYCLATE 100 MG
100 TABLET ORAL 2 TIMES DAILY
Qty: 14 TABLET | Refills: 0 | Status: SHIPPED | OUTPATIENT
Start: 2025-05-30 | End: 2025-06-06

## 2025-05-30 RX ORDER — BENZONATATE 100 MG/1
100 CAPSULE ORAL 3 TIMES DAILY PRN
Qty: 30 CAPSULE | Refills: 0 | Status: SHIPPED | OUTPATIENT
Start: 2025-05-30 | End: 2025-06-09

## 2025-05-30 RX ADMIN — DEXAMETHASONE SODIUM PHOSPHATE 10 MG: 4 INJECTION, SOLUTION INTRAMUSCULAR; INTRAVENOUS at 16:14

## 2025-05-30 RX ADMIN — IPRATROPIUM BROMIDE AND ALBUTEROL SULFATE 1 DOSE: .5; 3 SOLUTION RESPIRATORY (INHALATION) at 16:51

## 2025-05-30 ASSESSMENT — ENCOUNTER SYMPTOMS
SHORTNESS OF BREATH: 1
COUGH: 1
VOMITING: 0
COUGH: 1
WHEEZING: 1
NAUSEA: 0
DIARRHEA: 0
SHORTNESS OF BREATH: 1
GASTROINTESTINAL NEGATIVE: 1

## 2025-05-30 NOTE — ED TRIAGE NOTES
.Bibi Bullock is a 54 y.o. female was sent from the PCP for eval of low SpO2. When the patient was at the dr's office the patients SpO2 dropped to 86% when walking around. The pt denies being SOB but has a history of COPD and asthma . The patient states that she has had a cough x 1 non productive cough week. The patient was able to walk back from the lobby with a cane without complications and the patient is able to talk in complete sentences without any difficulty. The pts SpO2 was 90% and greater during triage. The patient does states that her cough is giving her chest pain. The patient is alert and oriented with an open and patent airway.

## 2025-05-30 NOTE — PROGRESS NOTES
Date: 5/30/2025                                               Subjective/Objective:     Chief Complaint   Patient presents with    Cough    Referral - General     Pain management for both knees    Headache     From coughing so hard       HPI    Bibi Bullock is a 53 yo female, visit today for c/o cough. She is accompanied by her significant other, Jaydon, today. Symptoms began about one week ago. This was productive at first. Is no longer productive. Feels deep. No congestion. No worsening of her usual SOB and wheezing. Denies fever/chills. Tessalon offers some relief. Denies known sick contacts. Is using her albuterol 5-6 times a day with some relief. Her nebulizer is broken.     Previously had home O2, reports after recent walk test they told her she no longer qualified.     Feels tired all the time. Can nod off to sleep at any time. She does snore at night. Unsure about apnea. Urinates often during the night.     Saw ortho for knee OA before she had strokes and other medical issues and was lost to follow up.   Needs new pain management referral.          Patient Active Problem List    Diagnosis Date Noted    CVA, old, facial weakness 01/25/2024    Other eosinophilia 01/13/2024    SIRS (systemic inflammatory response syndrome) ([facility]) 01/10/2024    Class 2 severe obesity with serious comorbidity in adult ([facility]) 01/10/2024    Left arm weakness 10/17/2023    Stuttering 10/17/2023    Left carotid artery occlusion 10/16/2023    TIA (transient ischemic attack) 09/18/2023    Carotid artery stenosis, symptomatic, left 09/18/2023    Anxiety 08/18/2023    Pure hypercholesterolemia 07/10/2023    Internal carotid artery stenosis, left 06/27/2023    Asthma-COPD overlap syndrome (HCC) 06/26/2023    Pre-syncope 06/26/2023    Palpitations 06/26/2023    Arthritis of right knee 05/31/2018    Chronic pain of right knee 05/31/2018    Hypothyroidism 05/30/2018    Prediabetes 05/24/2018    Depression 05/24/2018    RLS (restless legs

## 2025-05-30 NOTE — PATIENT INSTRUCTIONS
Go to ER  See sleep doctor - worried you have sleep apnea  See orthopedic and pain management for knee   ECHO [phone]

## 2025-05-30 NOTE — ED PROVIDER NOTES
[facility] EMERGENCY DEPARTMENT  [address] [facility] [address]  Dept: [phone]  Loc: [phone]  eMERGENCYdEPARTMENT eNCOUnter      Pt Name: Bibi Bullock  MRN: [id number]  Birthdate 1970  Date of evaluation: 5/30/2025  Provider:DEANNA Budny CNP      Independently seen and evaluated by the advanced practice provider  CHIEF COMPLAINT       Chief Complaint   Patient presents with    Cough     Pt sent by PCP fr low SpO2, pt has history of COPD  and asthma, pt has had a cough for 1 week        CRITICAL CARE TIME       HISTORY OF PRESENT ILLNESS  (Location/Symptom, Timing/Onset, Context/Setting, Quality, Duration,Modifying Factors, Severity.)   Bibi Bullock is a 54 y.o. female who presents to the emergency department PMHx: Asthma, COPD, hypothyroid, chronic pain, acute respiratory failure, RLS, TIA, carotid stenosis    Lives at home with spouse  CODE STATUS full  Anticoagulation therapy: None  Social determinants: No longer on oxygen therapy at home: No assistive devices, tobacco dependent, has a PCP, unemployed    HPI provided by the patient  Patient reports she was sent in by her primary care for possibly reports of hypoxia and COPD asthma exacerbation URI.    Patient reports she has had a cough pretty unrelenting since Monday and when the cough starts it is intractable and she cannot breathe and catch her breath.  She continues to wheeze.  Cough is intermittently productive.  Positive for chills.  Negative for fever.  Sore throat due to cough.  No recent travel.  No recent hospitalizations.  Not aware of any known illness exposure.  Chest is burning with her cough      Nursing Notes were reviewedand agreed with or any disagreements were addressed in the HPI.    REVIEW OF SYSTEMS    (2-9 systems for level 4, 10 or more for level 5)     Review of Systems   Constitutional:  Positive for activity change, appetite change and chills.   HENT:   verbal recognition program (DRAGON) and it was checked for errors. It is possible that there are still dictated errors within this office note. If so, please bring any errors to my attention for an addendum. All efforts were made to ensure that this office note is accurate.       Nova Ty, DEANNA - CNP  05/30/25 0326

## 2025-05-31 LAB
EKG ATRIAL RATE: 67 BPM
EKG DIAGNOSIS: NORMAL
EKG P AXIS: 83 DEGREES
EKG P-R INTERVAL: 172 MS
EKG Q-T INTERVAL: 410 MS
EKG QRS DURATION: 94 MS
EKG QTC CALCULATION (BAZETT): 433 MS
EKG R AXIS: 77 DEGREES
EKG T AXIS: 81 DEGREES
EKG VENTRICULAR RATE: 67 BPM

## 2025-05-31 PROCEDURE — 93010 ELECTROCARDIOGRAM REPORT: CPT | Performed by: STUDENT IN AN ORGANIZED HEALTH CARE EDUCATION/TRAINING PROGRAM

## 2025-06-02 ENCOUNTER — RESULTS FOLLOW-UP (OUTPATIENT)
Dept: INTERNAL MEDICINE CLINIC | Age: 55
End: 2025-06-02

## 2025-06-18 DIAGNOSIS — K21.9 LARYNGOPHARYNGEAL REFLUX (LPR): Primary | ICD-10-CM

## 2025-06-23 ENCOUNTER — PATIENT MESSAGE (OUTPATIENT)
Dept: PULMONOLOGY | Age: 55
End: 2025-06-23

## 2025-06-26 ENCOUNTER — OFFICE VISIT (OUTPATIENT)
Dept: ORTHOPEDIC SURGERY | Age: 55
End: 2025-06-26
Payer: COMMERCIAL

## 2025-06-26 VITALS — BODY MASS INDEX: 38.73 KG/M2 | WEIGHT: 241 LBS | HEIGHT: 66 IN

## 2025-06-26 DIAGNOSIS — Z72.0 TOBACCO ABUSE: ICD-10-CM

## 2025-06-26 DIAGNOSIS — M25.561 PATELLOFEMORAL ARTHRALGIA OF RIGHT KNEE: Primary | ICD-10-CM

## 2025-06-26 PROCEDURE — 99214 OFFICE O/P EST MOD 30 MIN: CPT | Performed by: ORTHOPAEDIC SURGERY

## 2025-06-26 NOTE — PROGRESS NOTES
ORTHOPAEDIC OFFICE NOTE    Chief Complaint   Patient presents with    Follow-up     Fu right knee        6/26/25  Patient returns to clinic today for follow-up of her right knee  She was last seen 2 years ago  She did not go see the pain management for consideration of radiofrequency ablation  She is using over-the-counter Tylenol  She is not now allowed to take oral NSAIDs  She continues to smoke, however she is trying to quit  Her right knee pain is described mainly anterior  Worse with prolonged sitting, getting up from a seated position, stairs  Intermittent numbness and tingling involving the right lower extremity        5/11/23  This is a 52 y.o. female who has had right knee pain for the past 15 years. The patient denies any injury. Pain is mostly over the anterior aspect of the knee, \"around the kneecap.\"  She describes the pain as throbbing, sometimes sharp.  The patient denies numbness and tingling of the extremity. The pain is rated as severe.  Any weightbearing activities makes the pain worse.  Nothing makes the pain better.  The patient used to see Dr. Hdz at [facility], where she was treated with multiple courses of physical therapy and subsequent arthroscopic surgery with what sounds to be chondroplasty.  She did okay after surgery, but the pain started back a few years after this.  She then saw Dr. Ma with [facility], who gave her a corticosteroid injection, viscosupplementation, and more physical therapy.  None of this gave her any relief.  She has also tried wearing a brace, ice, and anti-inflammatories, with minimal relief.  The patient smokes a pack of cigarettes per day.  She is currently on disability.        Past Medical History:   Diagnosis Date    Acid reflux     Anxiety     Arthritis     knees    Asthma     COPD (chronic obstructive pulmonary disease) (HCC)     Depression     Endometriosis     Fatty liver     Hx of blood clots     Hyperlipidemia     Knee pain     TIA (transient ischemic

## 2025-06-30 ENCOUNTER — OFFICE VISIT (OUTPATIENT)
Dept: ENT CLINIC | Age: 55
End: 2025-06-30
Payer: MEDICAID

## 2025-06-30 ENCOUNTER — HOSPITAL ENCOUNTER (OUTPATIENT)
Dept: CT IMAGING | Age: 55
Discharge: HOME | End: 2025-06-30
Attending: OTOLARYNGOLOGY
Payer: MEDICAID

## 2025-06-30 ENCOUNTER — CLINICAL DOCUMENTATION (OUTPATIENT)
Dept: SPEECH THERAPY | Age: 55
End: 2025-06-30

## 2025-06-30 ENCOUNTER — PROCEDURE VISIT (OUTPATIENT)
Dept: SPEECH THERAPY | Age: 55
End: 2025-06-30
Payer: MEDICAID

## 2025-06-30 VITALS
BODY MASS INDEX: 38.09 KG/M2 | DIASTOLIC BLOOD PRESSURE: 80 MMHG | HEART RATE: 73 BPM | HEIGHT: 66 IN | WEIGHT: 237 LBS | SYSTOLIC BLOOD PRESSURE: 121 MMHG

## 2025-06-30 DIAGNOSIS — J38.7 LESION OF LARYNX: ICD-10-CM

## 2025-06-30 DIAGNOSIS — K21.9 LARYNGOPHARYNGEAL REFLUX (LPR): ICD-10-CM

## 2025-06-30 DIAGNOSIS — R49.0 DYSPHONIA: Primary | ICD-10-CM

## 2025-06-30 DIAGNOSIS — J39.2 OROPHARYNGEAL LESION: ICD-10-CM

## 2025-06-30 DIAGNOSIS — J38.1 REINKE'S EDEMA OF VOCAL FOLDS: ICD-10-CM

## 2025-06-30 DIAGNOSIS — R49.0 DYSPHONIA: ICD-10-CM

## 2025-06-30 PROCEDURE — 99213 OFFICE O/P EST LOW 20 MIN: CPT | Performed by: OTOLARYNGOLOGY

## 2025-06-30 PROCEDURE — 6360000004 HC RX CONTRAST MEDICATION: Performed by: OTOLARYNGOLOGY

## 2025-06-30 PROCEDURE — 92524 BEHAVRAL QUALIT ANALYS VOICE: CPT | Performed by: SPEECH-LANGUAGE PATHOLOGIST

## 2025-06-30 PROCEDURE — 31579 LARYNGOSCOPY TELESCOPIC: CPT | Performed by: SPEECH-LANGUAGE PATHOLOGIST

## 2025-06-30 PROCEDURE — 92507 TX SP LANG VOICE COMM INDIV: CPT | Performed by: SPEECH-LANGUAGE PATHOLOGIST

## 2025-06-30 PROCEDURE — 70491 CT SOFT TISSUE NECK W/DYE: CPT

## 2025-06-30 RX ORDER — IOPAMIDOL 755 MG/ML
75 INJECTION, SOLUTION INTRAVASCULAR
Status: COMPLETED | OUTPATIENT
Start: 2025-06-30 | End: 2025-06-30

## 2025-06-30 RX ADMIN — IOPAMIDOL 75 ML: 755 INJECTION, SOLUTION INTRAVENOUS at 10:25

## 2025-06-30 NOTE — PROGRESS NOTES
has large lingual tonsils as well that are symmetric on the stroboscopy as well as the CT scan.  I again reiterated the importance of smoking cessation.  I think we should get another stroboscopy about 3 months systemic sure there are no other changes with the vocal cords.    2. Oropharyngeal lesion  She has large relatively symmetric lingual tonsils.  I do not feel as though there is an obvious lesion.  If radiology sees something I will call her.  We discussed smoking cessation and the relation between head neck cancer and smoking.    3. Lesion of larynx  Follow-up with a stroboscopy in 3 months             I have performed a head and neck physical exam personally or was physically present during the key or critical portions of the service.    This note was generated completely or in part utilizing Dragon dictation speech recognition software.  Occasionally, words are mistranscribed and despite editing, the text may contain inaccuracies due to incorrect word recognition.  If further clarification is needed please contact the office at [phone].

## 2025-06-30 NOTE — PROGRESS NOTES
Received refill request for:  Amiodarone    Future Appointments   Date Time Provider Department Center   5/2/2023  1:00 PM UMPPET1 CXPETCT [facility]MP Owned   5/16/2023  2:30 PM  LAB UCLABR [facility]   5/16/2023  3:00 PM  CV DEVICE 1 UCCVCV [facility]   5/16/2023  3:30 PM Roberto Batres MD [facility]   7/28/2023 12:00 AM  [facility]     TSH due, order placed to be done with 5/16/23 labs per refill guideline.     [employer] [facility] Refill Guideline reviewed.  Medication meets criteria for refill.    Essence Rome RN, BSN  04/25/23 at 10:12 AM       good candidate for further medical evaluation/intervention at the discretion of the treating physician.   Pt to follow up with ENT/Dr. Milligna.      CPT Code Units Billed Time Billed Today Date of POC Start Re-Certification Date Referring Provider   49208, 98703, 22678 3 Unit Time in: 1050  Time out: 1115  Total time: 25 min 6/30/2025 60 days Dr. Milligan       Thank you,    Mildred Lott MA (Katie), CCC-SLP; [id number]  [facility] Speech-Language Pathologist

## 2025-07-15 ENCOUNTER — HOSPITAL ENCOUNTER (OUTPATIENT)
Dept: PHYSICAL THERAPY | Age: 55
Setting detail: THERAPIES SERIES
Discharge: HOME | End: 2025-07-15
Attending: ORTHOPAEDIC SURGERY
Payer: MEDICAID

## 2025-07-15 DIAGNOSIS — M25.561 CHRONIC PAIN OF RIGHT KNEE: Primary | ICD-10-CM

## 2025-07-15 DIAGNOSIS — G89.29 CHRONIC PAIN OF LEFT KNEE: ICD-10-CM

## 2025-07-15 DIAGNOSIS — M25.562 CHRONIC PAIN OF LEFT KNEE: ICD-10-CM

## 2025-07-15 DIAGNOSIS — G89.29 CHRONIC PAIN OF RIGHT KNEE: Primary | ICD-10-CM

## 2025-07-15 PROCEDURE — 97162 PT EVAL MOD COMPLEX 30 MIN: CPT

## 2025-07-15 PROCEDURE — 97112 NEUROMUSCULAR REEDUCATION: CPT

## 2025-07-15 NOTE — PLAN OF CARE
[facility]: [address], [place] [address] office: [phone] fax: [phone]     Physical Therapy Initial Evaluation Certification      Dear Jarocho Barkley MD ,    We had the pleasure of evaluating the following patient for physical therapy services at [facility].  A summary of our findings can be found in the initial assessment below.  This includes our plan of care.  If you have any questions or concerns regarding these findings, please do not hesitate to contact me at the office phone number listed above.  Thank you for the referral.     Physician Signature:_______________________________Date:__________________  By signing above (or electronic signature), therapist’s plan is approved by physician       Physical Therapy: TREATMENT/PROGRESS NOTE   Patient: Bibi Bullock (55 y.o. female)   Examination Date: 07/15/2025   :  1970 MRN: [id number]   Visit #: 1   Insurance Allowable Auth Needed   [id number] []Yes    [x]No    Insurance: [place] / [place] / Product Type: *No Product type* /   Insurance ID: [id number] - (Medicaid Managed)  Secondary Insurance (if applicable):    Treatment Diagnosis:     ICD-10-CM    1. Chronic pain of right knee  M25.561     G89.29       2. Chronic pain of left knee  M25.562     G89.29          Medical Diagnosis:  Patellofemoral arthralgia of right knee [M25.561]   Referring Physician: Jarocho Barkley MD  PCP: Lacy Restrepo APRN     Plan of care signed (Y/N): NA    Date of Patient follow up with Physician: NS     Plan of Care Report: EVAL today  POC update due: (10 visits /OR AUTH LIMITS, whichever is less)  2025                                             Medical History:  Comorbidities:  Stroke/TIA  Anxiety  Depression  Relevant Medical History: adan neuropathy                                         Precautions/ Contra-indications:

## 2025-07-31 ENCOUNTER — PATIENT MESSAGE (OUTPATIENT)
Dept: PULMONOLOGY | Age: 55
End: 2025-07-31

## 2025-08-04 ENCOUNTER — TELEPHONE (OUTPATIENT)
Dept: ORTHOPEDIC SURGERY | Age: 55
End: 2025-08-04

## 2025-08-04 DIAGNOSIS — M17.11 ARTHRITIS OF RIGHT KNEE: ICD-10-CM

## 2025-08-04 DIAGNOSIS — M25.561 PATELLOFEMORAL ARTHRALGIA OF RIGHT KNEE: Primary | ICD-10-CM

## 2025-08-05 ENCOUNTER — HOSPITAL ENCOUNTER (OUTPATIENT)
Dept: PHYSICAL THERAPY | Age: 55
Setting detail: THERAPIES SERIES
End: 2025-08-05
Attending: ORTHOPAEDIC SURGERY
Payer: MEDICAID

## 2025-08-07 ENCOUNTER — APPOINTMENT (OUTPATIENT)
Dept: PHYSICAL THERAPY | Age: 55
End: 2025-08-07
Attending: ORTHOPAEDIC SURGERY
Payer: MEDICAID

## 2025-08-12 ENCOUNTER — APPOINTMENT (OUTPATIENT)
Dept: PHYSICAL THERAPY | Age: 55
End: 2025-08-12
Attending: ORTHOPAEDIC SURGERY
Payer: MEDICAID

## 2025-08-14 ENCOUNTER — HOSPITAL ENCOUNTER (OUTPATIENT)
Dept: PHYSICAL THERAPY | Age: 55
Setting detail: THERAPIES SERIES
End: 2025-08-14
Attending: ORTHOPAEDIC SURGERY
Payer: MEDICAID

## 2025-08-19 ENCOUNTER — HOSPITAL ENCOUNTER (OUTPATIENT)
Dept: PHYSICAL THERAPY | Age: 55
Setting detail: THERAPIES SERIES
Discharge: HOME | End: 2025-08-19
Attending: ORTHOPAEDIC SURGERY

## 2025-08-19 PROCEDURE — 97113 AQUATIC THERAPY/EXERCISES: CPT

## 2025-08-21 ENCOUNTER — HOSPITAL ENCOUNTER (OUTPATIENT)
Dept: PHYSICAL THERAPY | Age: 55
Setting detail: THERAPIES SERIES
Discharge: HOME | End: 2025-08-21
Attending: ORTHOPAEDIC SURGERY
Payer: MEDICARE

## 2025-08-21 PROCEDURE — 97113 AQUATIC THERAPY/EXERCISES: CPT

## 2025-08-26 ENCOUNTER — APPOINTMENT (OUTPATIENT)
Dept: PHYSICAL THERAPY | Age: 55
End: 2025-08-26
Attending: ORTHOPAEDIC SURGERY
Payer: MEDICAID

## 2025-09-02 ENCOUNTER — HOSPITAL ENCOUNTER (OUTPATIENT)
Dept: PHYSICAL THERAPY | Age: 55
Setting detail: THERAPIES SERIES
Discharge: HOME | End: 2025-09-02
Attending: ORTHOPAEDIC SURGERY
Payer: MEDICAID

## 2025-09-02 PROCEDURE — 97150 GROUP THERAPEUTIC PROCEDURES: CPT

## 2025-09-02 PROCEDURE — 97113 AQUATIC THERAPY/EXERCISES: CPT

## (undated) DEVICE — ELECTRODE ELECSURG NDL 2.8 INX7.2 CM COAT INSUL EDGE

## (undated) DEVICE — RETRACTOR SURG W18.3XL32.5CM PLAS SELF RET W/ 2 CATH CLP

## (undated) DEVICE — ADHESIVE SKIN CLOSURE WND 8.661X1.5 IN 22 CM LIQUIBAND SECUR

## (undated) DEVICE — PACK,UNIVERSAL,NO GOWNS: Brand: MEDLINE

## (undated) DEVICE — SYRINGE 5ML HYPO INTRLNK W/ VIAL ACCESS

## (undated) DEVICE — SHEET,DRAPE,40X58,STERILE: Brand: MEDLINE

## (undated) DEVICE — LARGE BORE STOPCOCK WITH ROTATING MALE LUER LOCK

## (undated) DEVICE — LOOP VES W13MM THK09MM MINI RED SIL FLD REPELLENT

## (undated) DEVICE — WIPE INSTR W73XL73CM VISITEC

## (undated) DEVICE — SYRINGE WITH HYPODERMIC SAFETY NEEDLE: Brand: MAGELLAN

## (undated) DEVICE — STAPLER SKIN H3.9MM WIRE DIA0.58MM CRWN 6.9MM 35 STPL ROT

## (undated) DEVICE — CLIP LIG M BLU TI HRT SHP WIRE HORZ 600 PER BX

## (undated) DEVICE — SPONGE GZ W4XL4IN COT 12 PLY TYP VII WVN C FLD DSGN STERILE

## (undated) DEVICE — BANDAGE,GAUZE,CONFORMING,4"X75",STRL,LF: Brand: MEDLINE

## (undated) DEVICE — SUTURE PERMA-HAND SZ 0 L18IN NONABSORBABLE BLK L30MM FSL 678G

## (undated) DEVICE — PROVE COVER: Brand: UNBRANDED

## (undated) DEVICE — SURGIFOAM SPNG SZ 100

## (undated) DEVICE — LOOP VES W1.3MM THK0.9MM MINI WHT SIL FLD REPELLENT

## (undated) DEVICE — 3M™ TEGADERM™ TRANSPARENT FILM DRESSING FRAME STYLE, 1624W, 2-3/8 IN X 2-3/4 IN (6 CM X 7 CM), 100/CT 4CT/CASE: Brand: 3M™ TEGADERM™

## (undated) DEVICE — SOLUTION IV 1000ML 0.9% SOD CHL

## (undated) DEVICE — SPONGE,PEANUT,XRAY,ST,SM,3/8",5/CARD: Brand: MEDLINE INDUSTRIES, INC.

## (undated) DEVICE — Device

## (undated) DEVICE — GLOVE SURG SZ 8 L11.6IN THK9.8MIL STRW LTX POLYMER BEAD CUF

## (undated) DEVICE — SUTURE MCRYL + SZ 4-0 L27IN ABSRB UD L19MM PS-2 3/8 CIR MCP426H

## (undated) DEVICE — SUTURE N ABSRB MONOFILAMENT 6-0 BV1 30 IN BLU PROLENE EPM8709

## (undated) DEVICE — CLIP SM RED INTERN HMOCLP TITAN LIGATING

## (undated) DEVICE — DRAPE MICSCP W54XL150IN W/ 4 BINOC GLS LENS LEICA

## (undated) DEVICE — CANNULA PERF L1.3IN TIP L2MM S STL POLYUR TB ARTOTMY BLB

## (undated) DEVICE — AEGIS 1" DISK 4MM HOLE, PEEL OPEN: Brand: MEDLINE

## (undated) DEVICE — SUTURE VCRL + SZ 2-0 L27IN ABSRB WHT SH 1/2 CIR TAPERCUT VCP417H

## (undated) DEVICE — SCANLAN® SUTURE BOOT™ INSTRUMENT JAW COVERS - ORIGINAL YELLOW, STANDARD PKG (5 PAIR/CARTRIDGE, 1 CARTRIDGE/PKG): Brand: SCANLAN® SUTURE BOOT™ INSTRUMENT JAW COVERS

## (undated) DEVICE — E-Z CLEAN, NON-STICK, PTFE COATED, ELECTROSURGICAL BLADE ELECTRODE, 2.5 INCH (6.35 CM): Brand: EZ CLEAN

## (undated) DEVICE — SUTURE PERMAHAND SZ 3-0 L30IN NONABSORBABLE BLK SILK BRAID A304H

## (undated) DEVICE — NEEDLE HYPO 16GA L1.5IN PUR POLYPR HUB S STL REG BVL STR

## (undated) DEVICE — SOLUTION IV SODIUM CHL 0.9% 500 ML

## (undated) DEVICE — GENERAL: Brand: MEDLINE INDUSTRIES, INC.

## (undated) DEVICE — SUTURE PERMAHAND SZ 2-0 L30IN NONABSORBABLE BLK SILK W/O A305H

## (undated) DEVICE — KIT CATHETER 20GA L12CM ART CUST

## (undated) DEVICE — SUTURE PROL SZ 7-0 L18IN NONABSORBABLE BLU L9.3MM BV-1 3/8 8701H

## (undated) DEVICE — DRAPE,INSTRUMENT,MAGNETIC,10X16: Brand: MEDLINE

## (undated) DEVICE — TRANSDUCER KT INVASIVE BLD PRSS SGL LN 1 CDP PT MT

## (undated) DEVICE — 3M™ IOBAN™ 2 ANTIMICROBIAL INCISE DRAPE 6640EZ: Brand: IOBAN™ 2

## (undated) DEVICE — PENCIL SMK EVAC TELSCP 3 M TBNG

## (undated) DEVICE — DECANTER BAG 9": Brand: MEDLINE INDUSTRIES, INC.

## (undated) DEVICE — GAUZE,SPONGE,4"X4",16PLY,XRAY,STRL,LF: Brand: MEDLINE

## (undated) DEVICE — FOGARTY - HYDRAGRIP SURGICAL - CLAMP INSERTS: Brand: FOGARTY SOFTJAW

## (undated) DEVICE — HOOK RETRACT STERIL 12MM S STL BLNT E STAY LONE STAR

## (undated) DEVICE — AGENT HEMOSTATIC SURGIFLOW MATRIX KIT W/THROMBIN

## (undated) DEVICE — LOOP,VESSEL,MAXI,BLUE,2/PK,STERILE: Brand: MEDLINE

## (undated) DEVICE — SOLUTION IV SODIUM CHL 0.9% 500 ML INJ FLX CONTAINER

## (undated) DEVICE — BLANKET WRM W40.2XL55.9IN IORT LO BODY + MISTRAL AIR

## (undated) DEVICE — AGENT HEMSTAT W2XL14IN OXIDIZED REGENERATED CELOS ABSRB FOR

## (undated) DEVICE — BOWL MED L 32OZ PLAS W/ MOLD GRAD EZ OPN PEEL PCH

## (undated) DEVICE — TOWEL,OR,DSP,ST,BLUE,DLX,8/PK,10PK/CS: Brand: MEDLINE

## (undated) DEVICE — 3M™ TEGADERM™ TRANSPARENT FILM DRESSING FRAME STYLE, 1626, 4 IN X 4-3/4 IN (10 CM X 12 CM), 50/CT 4CT/CASE: Brand: 3M™ TEGADERM™

## (undated) DEVICE — BLADE,CARBON-STEEL,11,STRL,DISPOSABLE,TB: Brand: MEDLINE

## (undated) DEVICE — 4-PORT MANIFOLD: Brand: NEPTUNE 2

## (undated) DEVICE — SUTURE VCRL + SZ 3-0 L27IN ABSRB UD L26MM SH 1/2 CIR VCP416H

## (undated) DEVICE — SYRINGE MED 10ML LUERLOCK TIP W/O SFTY DISP

## (undated) DEVICE — 20 ML SYRINGE LUER-LOCK TIP: Brand: MONOJECT

## (undated) DEVICE — STANDARD INSTRUMENT WIPE: Brand: DEROYAL

## (undated) DEVICE — PRESSURE MONITORING SET: Brand: TRUWAVE, VAMP PLUS

## (undated) DEVICE — COVER LT HNDL BLU PLAS

## (undated) DEVICE — 500ML,PRESSURE INFUSER W/STOPCOCK: Brand: MEDLINE